# Patient Record
Sex: MALE | Race: WHITE | Employment: OTHER | URBAN - METROPOLITAN AREA
[De-identification: names, ages, dates, MRNs, and addresses within clinical notes are randomized per-mention and may not be internally consistent; named-entity substitution may affect disease eponyms.]

---

## 2018-07-16 RX ORDER — DULOXETIN HYDROCHLORIDE 60 MG/1
60 CAPSULE, DELAYED RELEASE ORAL DAILY
Qty: 30 CAP | Refills: 0 | Status: SHIPPED | OUTPATIENT
Start: 2018-07-16 | End: 2018-08-12 | Stop reason: SDUPTHER

## 2018-07-16 RX ORDER — DULOXETIN HYDROCHLORIDE 60 MG/1
CAPSULE, DELAYED RELEASE ORAL
Qty: 90 CAP | Refills: 3 | OUTPATIENT
Start: 2018-07-16

## 2018-07-16 NOTE — TELEPHONE ENCOUNTER
Requested Prescriptions     Pending Prescriptions Disp Refills    DULoxetine (CYMBALTA) 60 mg capsule 30 Cap 0     Sig: Take 1 Cap by mouth daily.      Refused Prescriptions Disp Refills    DULoxetine (CYMBALTA) 60 mg capsule [Pharmacy Med Name: DULOXETINE HCL DR 60 MG CAP] 90 Cap 3     Sig: TAKE ONE CAPSULE BY MOUTH EVERY DAY     Refused By: Mariano Smart     Reason for Refusal: Appt required, please call patient     Has follow up appt 7-27-18

## 2018-08-12 RX ORDER — DULOXETIN HYDROCHLORIDE 60 MG/1
CAPSULE, DELAYED RELEASE ORAL
Qty: 30 CAP | Refills: 0 | Status: SHIPPED | OUTPATIENT
Start: 2018-08-12 | End: 2018-08-16 | Stop reason: SDUPTHER

## 2018-08-14 PROBLEM — K57.90 DIVERTICULOSIS: Status: ACTIVE | Noted: 2018-08-14

## 2018-08-14 PROBLEM — K63.5 COLON POLYPS: Status: ACTIVE | Noted: 2018-08-14

## 2018-08-14 PROBLEM — M02.30 REACTIVE ARTHRITIS (HCC): Status: ACTIVE | Noted: 2018-08-14

## 2018-08-14 PROBLEM — F17.200 TOBACCO DEPENDENCE: Status: ACTIVE | Noted: 2018-08-14

## 2018-08-14 PROBLEM — K58.9 IBS (IRRITABLE BOWEL SYNDROME): Status: ACTIVE | Noted: 2018-08-14

## 2018-08-14 PROBLEM — F32.A DEPRESSION: Status: ACTIVE | Noted: 2018-08-14

## 2018-08-14 PROBLEM — M79.7 FIBROMYALGIA: Status: ACTIVE | Noted: 2018-08-14

## 2018-08-14 PROBLEM — K50.90 CROHN'S DISEASE (HCC): Status: ACTIVE | Noted: 2018-08-14

## 2018-08-14 PROBLEM — D86.9 SARCOIDOSIS: Status: ACTIVE | Noted: 2018-08-14

## 2018-08-14 PROBLEM — B35.1 ONYCHOMYCOSIS: Status: ACTIVE | Noted: 2018-08-14

## 2018-08-16 ENCOUNTER — OFFICE VISIT (OUTPATIENT)
Dept: INTERNAL MEDICINE CLINIC | Age: 58
End: 2018-08-16

## 2018-08-16 VITALS
HEART RATE: 82 BPM | OXYGEN SATURATION: 95 % | DIASTOLIC BLOOD PRESSURE: 90 MMHG | BODY MASS INDEX: 26.21 KG/M2 | HEIGHT: 73 IN | SYSTOLIC BLOOD PRESSURE: 148 MMHG | WEIGHT: 197.8 LBS

## 2018-08-16 DIAGNOSIS — Z11.59 NEED FOR HEPATITIS C SCREENING TEST: ICD-10-CM

## 2018-08-16 DIAGNOSIS — K50.919 CROHN'S DISEASE WITH COMPLICATION, UNSPECIFIED GASTROINTESTINAL TRACT LOCATION (HCC): ICD-10-CM

## 2018-08-16 DIAGNOSIS — K21.9 GASTROESOPHAGEAL REFLUX DISEASE WITHOUT ESOPHAGITIS: ICD-10-CM

## 2018-08-16 DIAGNOSIS — M79.7 FIBROMYALGIA: ICD-10-CM

## 2018-08-16 DIAGNOSIS — Z23 ENCOUNTER FOR IMMUNIZATION: ICD-10-CM

## 2018-08-16 DIAGNOSIS — Z00.00 ROUTINE PHYSICAL EXAMINATION: Primary | ICD-10-CM

## 2018-08-16 DIAGNOSIS — F51.04 CHRONIC INSOMNIA: ICD-10-CM

## 2018-08-16 LAB
BACTERIA UA POCT, BACTPOCT: NORMAL
BILIRUB UR QL STRIP: NEGATIVE
CASTS UA POCT: 0
CLUE CELLS, CLUEPOCT: NEGATIVE
CRYSTALS UA POCT, CRYSPOCT: NEGATIVE
EPITHELIAL CELLS POCT: NEGATIVE
GLUCOSE UR-MCNC: NEGATIVE MG/DL
GRAN# POC: 4.8 K/UL (ref 2–7.8)
GRAN% POC: 73.1 % (ref 37–92)
HCT VFR BLD CALC: 46.7 % (ref 37–51)
HGB BLD-MCNC: 15.6 G/DL (ref 12–18)
KETONES P FAST UR STRIP-MCNC: NEGATIVE MG/DL
LY# POC: 1.4 K/UL (ref 0.6–4.1)
LY% POC: 23.2 % (ref 10–58.5)
MCH RBC QN: 31.2 PG (ref 26–32)
MCHC RBC-ENTMCNC: 33.4 G/DL (ref 30–36)
MCV RBC: 93 FL (ref 80–97)
MID #, POC: 0.2 K/UL (ref 0–1.8)
MID% POC: 3.7 % (ref 0.1–24)
MUCUS UA POCT, MUCPOCT: NORMAL
PH UR STRIP: 6 [PH] (ref 5–7)
PLATELET # BLD: 289 K/UL (ref 140–440)
PROT UR QL STRIP: NEGATIVE
RBC # BLD: 5 M/UL (ref 4.2–6.3)
RBC UA POCT, RBCPOCT: 0
SP GR UR STRIP: 1.01 (ref 1.01–1.02)
TRICH UA POCT, TRICHPOC: NEGATIVE
UA UROBILINOGEN AMB POC: NORMAL (ref 0.2–1)
URINALYSIS CLARITY POC: CLEAR
URINALYSIS COLOR POC: NORMAL
URINE BLOOD POC: NEGATIVE
URINE CULT COMMENT, POCT: NORMAL
URINE LEUKOCYTES POC: NEGATIVE
URINE NITRITES POC: NEGATIVE
WBC # BLD: 6.4 K/UL (ref 4.1–10.9)
WBC UA POCT, WBCPOCT: 0
YEAST UA POCT, YEASTPOC: NEGATIVE

## 2018-08-16 RX ORDER — DULOXETIN HYDROCHLORIDE 60 MG/1
CAPSULE, DELAYED RELEASE ORAL
Qty: 90 CAP | Refills: 3 | Status: SHIPPED | OUTPATIENT
Start: 2018-08-16 | End: 2019-09-04 | Stop reason: SDUPTHER

## 2018-08-16 RX ORDER — TRAZODONE HYDROCHLORIDE 50 MG/1
TABLET ORAL
COMMUNITY
End: 2022-03-21 | Stop reason: SDUPTHER

## 2018-08-16 RX ORDER — DIPHENOXYLATE HYDROCHLORIDE AND ATROPINE SULFATE 2.5; .025 MG/1; MG/1
TABLET ORAL
COMMUNITY
End: 2020-02-26 | Stop reason: ALTCHOICE

## 2018-08-16 RX ORDER — ZOLPIDEM TARTRATE 12.5 MG/1
TABLET, FILM COATED, EXTENDED RELEASE ORAL
Refills: 3 | COMMUNITY
Start: 2018-07-24 | End: 2022-03-21 | Stop reason: SDUPTHER

## 2018-08-16 RX ORDER — DEXLANSOPRAZOLE 60 MG/1
60 CAPSULE, DELAYED RELEASE ORAL
COMMUNITY

## 2018-08-16 NOTE — PATIENT INSTRUCTIONS
Learning About Cutting Calories  How do calories affect your weight? Food gives your body energy. Energy from the food you eat is measured in calories. This energy keeps your heart beating, your brain active, and your muscles working. Your body needs a certain number of calories each day. After your body uses the calories it needs, it stores extra calories as fat. To lose weight safely, you have to eat fewer calories while eating in a healthy way. How many calories do you need each day? The more active you are, the more calories you need. When you are less active, you need fewer calories. How many calories you need each day also depends on several things, including your age and whether you are male or female. Here are some general guidelines for adults:  · Less active women and older adults need 1,600 to 2,000 calories each day. · Active women and less active men need 2,000 to 2,400 calories each day. · Active men need 2,400 to 3,000 calories each day. How can you cut calories and eat healthy meals? Whole grains, vegetables and fruits, and dried beans are good lower-calorie foods. They give you lots of nutrients and fiber. And they fill you up. Sweets, energy drinks, and soda pop are high in calories. They give you few nutrients and no fiber. Try to limit soda pop, fruit juice, and energy drinks. Drink water instead. Some fats can be part of a healthy diet. But cutting back on fats from highly processed foods like fast foods and many snack foods is a good way to lower the calories in your diet. Also, use smaller amounts of fats like butter, margarine, salad dressing, and mayonnaise. Add fresh garlic, lemon, or herbs to your meals to add flavor without adding fat. Meats and dairy products can be a big source of hidden fats. Try to choose lean or low-fat versions of these products. Fat-free cookies, candies, chips, and frozen treats can still be high in sugar and calories.  Some fat-free foods have more calories than regular ones. Eat fat-free treats in moderation, as you would other foods. If your favorite foods are high in fat, salt, sugar, or calories, limit how often you eat them. Eat smaller servings, or look for healthy substitutes. Fill up on fruits, vegetables, and whole grains. Eating at home  · Use meat as a side dish instead of as the main part of your meal.  · Try main dishes that use whole wheat pasta, brown rice, dried beans, or vegetables. · Find ways to cook with little or no fat, such as broiling, steaming, or grilling. · Use cooking spray instead of oil. If you use oil, use a monounsaturated oil, such as canola or olive oil. · Trim fat from meats before you cook them. · Drain off fat after you brown the meat or while you roast it. · Chill soups and stews after you cook them. Then skim the fat off the top after it hardens. Eating out  · Order foods that are broiled or poached rather than fried or breaded. · Cut back on the amount of butter or margarine that you use on bread. · Order sauces, gravies, and salad dressings on the side, and use only a little. · When you order pasta, choose tomato sauce rather than cream sauce. · Ask for salsa with your baked potato instead of sour cream, butter, cheese, or leos. · Order meals in a small size instead of upgrading to a large. · Share an entree, or take part of your food home to eat as another meal.  · Share appetizers and desserts. Where can you learn more? Go to http://mane-evelio.info/. Enter [id number] in the search box to learn more about \"Learning About Cutting Calories. \"  Current as of: May 12, 2017  Content Version: 11.7  © 7165-2631 [employer]. Care instructions adapted under license by [employer] (which disclaims liability or warranty for this information).  If you have questions about a medical condition or this instruction, always ask your healthcare professional. [employer] disclaims any warranty or liability for your use of this information. Vaccine Information Statement     Tdap (Tetanus, Diphtheria, Pertussis) Vaccine: What You Need to Know    Many Vaccine Information Statements are available in [place]ian and other languages. See www.immunize.org/vis. Hojas de Información Sobre Vacunas están disponibles en español y en muchos otros idiomas. Visite [employer]    1. Why get vaccinated? Tetanus, diphtheria, and pertussis are very serious diseases. Tdap vaccine can protect us from these diseases. And, Tdap vaccine given to pregnant women can protect  babies against pertussis. TETANUS (Lockjaw) is rare in the [facility] today. It causes painful muscle tightening and stiffness, usually all over the body.  It can lead to tightening of muscles in the head and neck so you cant open your mouth, swallow, or sometimes even breathe. Tetanus kills about 1 out of 10 people who are infected even after receiving the best medical care. DIPHTHERIA is also rare in the [facility] today. It can cause a thick coating to form in the back of the throat.  It can lead to breathing problems, heart failure, paralysis, and death. PERTUSSIS (Whooping Cough) causes severe coughing spells, which can cause difficulty breathing, vomiting, and disturbed sleep.  It can also lead to weight loss, incontinence, and rib fractures. Up to 2 in 100 adolescents and 5 in 100 adults with pertussis are hospitalized or have complications, which could include pneumonia or death. These diseases are caused by bacteria. Diphtheria and pertussis are spread from person to person through secretions from coughing or sneezing. Tetanus enters the body through cuts, scratches, or wounds. Before vaccines, as many as 200,000 cases of diphtheria, 200,000 cases of pertussis, and hundreds of cases of tetanus, were reported in the [place] each year.  Since vaccination began, reports of cases for tetanus and diphtheria have dropped by about 99% and for pertussis by about 80%. 2. Tdap vaccine    Tdap vaccine can protect adolescents and adults from tetanus, diphtheria, and pertussis. One dose of Tdap is routinely given at age 6 or 15. People who did not get Tdap at that age should get it as soon as possible. Tdap is especially important for health care professionals and anyone having close contact with a baby younger than 12 months. Pregnant women should get a dose of Tdap during every pregnancy, to protect the  from pertussis. Infants are most at risk for severe, life-threatening complications from pertussis. Another vaccine, called Td, protects against tetanus and diphtheria, but not pertussis. A Td booster should be given every 10 years. Tdap may be given as one of these boosters if you have never gotten Tdap before. Tdap may also be given after a severe cut or burn to prevent tetanus infection. Your doctor or the person giving you the vaccine can give you more information. Tdap may safely be given at the same time as other vaccines. 3. Some people should not get this vaccine     A person who has ever had a life-threatening allergic reaction after a previous dose of any diphtheria, tetanus or pertussis containing vaccine, OR has a severe allergy to any part of this vaccine, should not get Tdap vaccine. Tell the person giving the vaccine about any severe allergies.  Anyone who had coma or long repeated seizures within 7 days after a childhood dose of DTP or DTaP, or a previous dose of Tdap, should not get Tdap, unless a cause other than the vaccine was found. They can still get Td.      Talk to your doctor if you:  - have seizures or another nervous system problem,  - had severe pain or swelling after any vaccine containing diphtheria, tetanus or pertussis,   - ever had a condition called Guillain Barré Syndrome (GBS),  - arent feeling well on the day the shot is scheduled. 4. Risks    With any medicine, including vaccines, there is a chance of side effects. These are usually mild and go away on their own. Serious reactions are also possible but are rare. Most people who get Tdap vaccine do not have any problems with it. Mild Problems following Tdap  (Did not interfere with activities)   Pain where the shot was given (about 3 in 4 adolescents or 2 in 3 adults)   Redness or swelling where the shot was given (about 1 person in 5)   Mild fever of at least 100.4°F (up to about 1 in 25 adolescents or 1 in 100 adults)   Headache (about 3 or 4 people in 10)   Tiredness (about 1 person in 3 or 4)   Nausea, vomiting, diarrhea, stomach ache (up to 1 in 4 adolescents or 1 in 10 adults)   Chills,  sore joints (about 1 person in 10)   Body aches (about 1 person in 3 or 4)    Rash, swollen glands (uncommon)    Moderate Problems following Tdap  (Interfered with activities, but did not require medical attention)   Pain where the shot was given (up to 1 in 5 or 6)    Redness or swelling where the shot was given (up to about 1 in 16 adolescents or 1 in 12 adults)   Fever over 102°F (about 1 in 100 adolescents or 1 in 250 adults)   Headache (about 1 in 7 adolescents or 1 in 10 adults)   Nausea, vomiting, diarrhea, stomach ache (up to 1 or 3 people in 100)   Swelling of the entire arm where the shot was given (up to about 1 in 500). Severe Problems following Tdap  (Unable to perform usual activities; required medical attention)   Swelling, severe pain, bleeding, and redness in the arm where the shot was given (rare). Problems that could happen after any vaccine:     People sometimes faint after a medical procedure, including vaccination. Sitting or lying down for about 15 minutes can help prevent fainting, and injuries caused by a fall.  Tell your doctor if you feel dizzy, or have vision changes or ringing in the ears.     Some people get severe pain in the shoulder and have difficulty moving the arm where a shot was given. This happens very rarely.  Any medication can cause a severe allergic reaction. Such reactions from a vaccine are very rare, estimated at fewer than 1 in a million doses, and would happen within a few minutes to a few hours after the vaccination. As with any medicine, there is a very remote chance of a vaccine causing a serious injury or death. The safety of vaccines is always being monitored. For more information, visit: www.cdc.gov/vaccinesafety/    5. What if there is a serious problem? What should I look for?  Look for anything that concerns you, such as signs of a severe allergic reaction, very high fever, or unusual behavior.  Signs of a severe allergic reaction can include hives, swelling of the face and throat, difficulty breathing, a fast heartbeat, dizziness, and weakness. These would usually start a few minutes to a few hours after the vaccination. What should I do?  If you think it is a severe allergic reaction or other emergency that cant wait, call 9-1-1 or get the person to the nearest hospital. Otherwise, call your doctor.  Afterward, the reaction should be reported to the Vaccine Adverse Event Reporting System (VAERS). Your doctor might file this report, or you can do it yourself through the VAERS web site at www.vaers. hhs.gov, or by calling 8-614.188.2816. VAERS does not give medical advice. 6. The National Vaccine Injury Compensation Program    The [facility] Vaccine Injury Compensation Program (VICP) is a federal program that was created to compensate people who may have been injured by certain vaccines. Persons who believe they may have been injured by a vaccine can learn about the program and about filing a claim by calling 8-766.168.9637 or visiting the [employer] website at www.[facility].gov/vaccinecompensation.  There is a time limit to file a claim for compensation. 7. How can I learn more?  Ask your doctor. He or she can give you the vaccine package insert or suggest other sources of information.  Call your local or state health department.  Contact the Centers for Disease Control and Prevention (CDC):  - Call 0-626.639.8401 (1-800-CDC-INFO) or  - Visit CDCs website at www.cdc.gov/vaccines      Vaccine Information Statement   Tdap Vaccine  (2/24/2015)  42 SARAVANAN Delgado [id number]    Department of Health and Human Services  Centers for Disease Control and Prevention    Office Use Only

## 2018-08-16 NOTE — MR AVS SNAPSHOT
[address] [place] [address] [address] [id number] [phone] Patient: Campbell Rm. MRN: [id number] BQN:[id number] Visit Information Date & Time Provider Department Dept. Phone Encounter #  
 8/16/2018 10:00 AM Doug Maldonado [phone] [id number] Follow-up Instructions Return in about 1 year (around 8/16/2019). Upcoming Health Maintenance Date Due Hepatitis C Screening 1960 DTaP/Tdap/Td series (1 - Tdap) 6/19/1981 Influenza Age 5 to Adult 8/1/2018 COLONOSCOPY 7/20/2020 Allergies as of 8/16/2018  Review Complete On: 8/16/2018 By: Nunu Patel MD  
  
 Severity Noted Reaction Type Reactions Remicade [Infliximab]  08/16/2018    Other (comments) Current Immunizations  Never Reviewed Name Date Tdap  Incomplete Not reviewed this visit You Were Diagnosed With   
  
 Codes Comments Routine physical examination    -  Primary ICD-10-CM: Z00.00 ICD-9-CM: V70.0 Fibromyalgia     ICD-10-CM: M79.7 ICD-9-CM: 729.1 Crohn's disease with complication, unspecified gastrointestinal tract location [facility])     ICD-10-CM: B93.727 ICD-9-CM: 555.9 Gastroesophageal reflux disease without esophagitis     ICD-10-CM: K21.9 ICD-9-CM: 530.81 Chronic insomnia     ICD-10-CM: F51.04 
ICD-9-CM: 780.52 Need for hepatitis C screening test     ICD-10-CM: Z11.59 
ICD-9-CM: V73.89 Encounter for immunization     ICD-10-CM: L28 ICD-9-CM: V03.89 Vitals BP Pulse Height(growth percentile) Weight(growth percentile) SpO2 BMI  
 148/90 (BP 1 Location: Right arm, BP Patient Position: Sitting) 82 6' 1\" (1.854 m) 197 lb 12.8 oz (89.7 kg) 95% 26.1 kg/m2 Smoking Status Current Every Day Smoker BMI and BSA Data Body Mass Index Body Surface Area  
 26.1 kg/m 2 2.15 m 2 Preferred Pharmacy Pharmacy Name Phone [facility][id number] [facility] [phone] Your Updated Medication List  
  
   
This list is accurate as of 8/16/18 10:47 AM.  Always use your most recent med list.  
  
  
  
  
 DEXILANT 60 mg Cpdb Generic drug:  Dexlansoprazole Take  by mouth. DULoxetine 60 mg capsule Commonly known as:  CYMBALTA TAKE 1 CAPSULE BY MOUTH EVERY DAY  
  
 LOMOTIL 2.5-0.025 mg per tablet Generic drug:  diphenoxylate-atropine Take  by mouth four (4) times daily as needed for Diarrhea. traZODone 50 mg tablet Commonly known as:  Debbe Gunner Take  by mouth nightly. zolpidem CR 12.5 mg tablet Commonly known as:  AMBIEN CR  
TAKE 1 TABLET BY MOUTH AT BEDTIME Prescriptions Sent to Pharmacy Refills DULoxetine (CYMBALTA) 60 mg capsule 3 Sig: TAKE 1 CAPSULE BY MOUTH EVERY DAY Class: Normal  
 Pharmacy: [address] #: [phone] We Performed the Following AMB POC COMPLETE CBC,AUTOMATED ENTER [id number] CPT(R)] AMB POC URINALYSIS DIP STICK AUTO W/ MICRO  [48609 CPT(R)] COLLECTION VENOUS BLOOD,VENIPUNCTURE [id number] CPT(R)] HEPATITIS C AB [76275 CPT(R)] LIPID PANEL [46024 CPT(R)] METABOLIC PANEL, COMPREHENSIVE [79084 CPT(R)] DC IMMUNIZ ADMIN,1 SINGLE/COMB VAC/TOXOID [id number] CPT(R)] PSA, DIAGNOSTIC (PROSTATE SPECIFIC AG) [id number] CPT(R)] TETANUS, DIPHTHERIA TOXOIDS AND ACELLULAR PERTUSSIS VACCINE (TDAP), IN INDIVIDS. >=7, IM [id number] CPT(R)] TSH 3RD GENERATION [55946 CPT(R)] Follow-up Instructions Return in about 1 year (around 8/16/2019). Patient Instructions Learning About Cutting Calories How do calories affect your weight? Food gives your body energy.  Energy from the food you eat is measured in calories. This energy keeps your heart beating, your brain active, and your muscles working. Your body needs a certain number of calories each day. After your body uses the calories it needs, it stores extra calories as fat. To lose weight safely, you have to eat fewer calories while eating in a healthy way. How many calories do you need each day? The more active you are, the more calories you need. When you are less active, you need fewer calories. How many calories you need each day also depends on several things, including your age and whether you are male or female. Here are some general guidelines for adults: 
· Less active women and older adults need 1,600 to 2,000 calories each day. · Active women and less active men need 2,000 to 2,400 calories each day. · Active men need 2,400 to 3,000 calories each day. How can you cut calories and eat healthy meals? Whole grains, vegetables and fruits, and dried beans are good lower-calorie foods. They give you lots of nutrients and fiber. And they fill you up. Sweets, energy drinks, and soda pop are high in calories. They give you few nutrients and no fiber. Try to limit soda pop, fruit juice, and energy drinks. Drink water instead. Some fats can be part of a healthy diet. But cutting back on fats from highly processed foods like fast foods and many snack foods is a good way to lower the calories in your diet. Also, use smaller amounts of fats like butter, margarine, salad dressing, and mayonnaise. Add fresh garlic, lemon, or herbs to your meals to add flavor without adding fat. Meats and dairy products can be a big source of hidden fats. Try to choose lean or low-fat versions of these products. Fat-free cookies, candies, chips, and frozen treats can still be high in sugar and calories. Some fat-free foods have more calories than regular ones. Eat fat-free treats in moderation, as you would other foods. If your favorite foods are high in fat, salt, sugar, or calories, limit how often you eat them. Eat smaller servings, or look for healthy substitutes. Fill up on fruits, vegetables, and whole grains. Eating at home · Use meat as a side dish instead of as the main part of your meal. 
· Try main dishes that use whole wheat pasta, brown rice, dried beans, or vegetables. · Find ways to cook with little or no fat, such as broiling, steaming, or grilling. · Use cooking spray instead of oil. If you use oil, use a monounsaturated oil, such as canola or olive oil. · Trim fat from meats before you cook them. · Drain off fat after you brown the meat or while you roast it. · Chill soups and stews after you cook them. Then skim the fat off the top after it hardens. Eating out · Order foods that are broiled or poached rather than fried or breaded. · Cut back on the amount of butter or margarine that you use on bread. · Order sauces, gravies, and salad dressings on the side, and use only a little. · When you order pasta, choose tomato sauce rather than cream sauce. · Ask for salsa with your baked potato instead of sour cream, butter, cheese, or leos. · Order meals in a small size instead of upgrading to a large. · Share an entree, or take part of your food home to eat as another meal. 
· Share appetizers and desserts. Where can you learn more? Go to http://mane-evelio.info/. Enter [phone] [id number] in the search box to learn more about \"Learning About Cutting Calories. \" Current as of: May 12, 2017 Content Version: 11.7 © 0765-1779 [employer], [employer]. Care instructions adapted under license by [employer] (which disclaims liability or warranty for this information). If you have questions about a medical condition or this instruction, always ask your healthcare professional. [address] any warranty or liability for your use of this information. Vaccine Information Statement Tdap (Tetanus, Diphtheria, Pertussis) Vaccine: What You Need to Know Many Vaccine Information Statements are available in [place]ian and other languages. See www.immunize.org/vis. Hojas de Información Sobre Vacunas están disponibles en español y en muchos otros idiomas. Visite JasScale.si 1. Why get vaccinated? Tetanus, diphtheria, and pertussis are very serious diseases. Tdap vaccine can protect us from these diseases. And, Tdap vaccine given to pregnant women can protect  babies against pertussis. TETANUS (Lockjaw) is rare in the [facility] today. It causes painful muscle tightening and stiffness, usually all over the body. ? It can lead to tightening of muscles in the head and neck so you cant open your mouth, swallow, or sometimes even breathe. Tetanus kills about 1 out of 10 people who are infected even after receiving the best medical care. DIPHTHERIA is also rare in the [facility] today. It can cause a thick coating to form in the back of the throat. ? It can lead to breathing problems, heart failure, paralysis, and death. PERTUSSIS (Whooping Cough) causes severe coughing spells, which can cause difficulty breathing, vomiting, and disturbed sleep. ? It can also lead to weight loss, incontinence, and rib fractures. Up to 2 in 100 adolescents and 5 in 100 adults with pertussis are hospitalized or have complications, which could include pneumonia or death. These diseases are caused by bacteria. Diphtheria and pertussis are spread from person to person through secretions from coughing or sneezing. Tetanus enters the body through cuts, scratches, or wounds. Before vaccines, as many as 200,000 cases of diphtheria, 200,000 cases of pertussis, and hundreds of cases of tetanus, were reported in the [place] each year.  Since vaccination began, reports of cases for tetanus and diphtheria have dropped by about 99% and for pertussis by about 80%. 2. Tdap vaccine Tdap vaccine can protect adolescents and adults from tetanus, diphtheria, and pertussis. One dose of Tdap is routinely given at age 6 or 15. People who did not get Tdap at that age should get it as soon as possible. Tdap is especially important for health care professionals and anyone having close contact with a baby younger than 12 months. Pregnant women should get a dose of Tdap during every pregnancy, to protect the  from pertussis. Infants are most at risk for severe, life-threatening complications from pertussis. Another vaccine, called Td, protects against tetanus and diphtheria, but not pertussis. A Td booster should be given every 10 years. Tdap may be given as one of these boosters if you have never gotten Tdap before. Tdap may also be given after a severe cut or burn to prevent tetanus infection. Your doctor or the person giving you the vaccine can give you more information. Tdap may safely be given at the same time as other vaccines. 3. Some people should not get this vaccine  A person who has ever had a life-threatening allergic reaction after a previous dose of any diphtheria, tetanus or pertussis containing vaccine, OR has a severe allergy to any part of this vaccine, should not get Tdap vaccine. Tell the person giving the vaccine about any severe allergies.  Anyone who had coma or long repeated seizures within 7 days after a childhood dose of DTP or DTaP, or a previous dose of Tdap, should not get Tdap, unless a cause other than the vaccine was found. They can still get Td.  Talk to your doctor if you: 
- have seizures or another nervous system problem, 
- had severe pain or swelling after any vaccine containing diphtheria, tetanus or pertussis,  
- ever had a condition called Guillain Barré Syndrome (GBS), 
- arent feeling well on the day the shot is scheduled. 4. Risks With any medicine, including vaccines, there is a chance of side effects. These are usually mild and go away on their own. Serious reactions are also possible but are rare. Most people who get Tdap vaccine do not have any problems with it. Mild Problems following Tdap 
(Did not interfere with activities)  Pain where the shot was given (about 3 in 4 adolescents or 2 in 3 adults)  Redness or swelling where the shot was given (about 1 person in 5)  Mild fever of at least 100.4°F (up to about 1 in 25 adolescents or 1 in 100 adults)  Headache (about 3 or 4 people in 10)  Tiredness (about 1 person in 3 or 4)  Nausea, vomiting, diarrhea, stomach ache (up to 1 in 4 adolescents or 1 in 10 adults)  Chills,  sore joints (about 1 person in 10)  Body aches (about 1 person in 3 or 4)  Rash, swollen glands (uncommon) Moderate Problems following Tdap (Interfered with activities, but did not require medical attention)  Pain where the shot was given (up to 1 in 5 or 6)  Redness or swelling where the shot was given (up to about 1 in 16 adolescents or 1 in 12 adults)  Fever over 102°F (about 1 in 100 adolescents or 1 in 250 adults)  Headache (about 1 in 7 adolescents or 1 in 10 adults)  Nausea, vomiting, diarrhea, stomach ache (up to 1 or 3 people in 100)  Swelling of the entire arm where the shot was given (up to about 1 in 500). Severe Problems following Tdap 
(Unable to perform usual activities; required medical attention)  Swelling, severe pain, bleeding, and redness in the arm where the shot was given (rare). Problems that could happen after any vaccine:  People sometimes faint after a medical procedure, including vaccination. Sitting or lying down for about 15 minutes can help prevent fainting, and injuries caused by a fall. Tell your doctor if you feel dizzy, or have vision changes or ringing in the ears.  Some people get severe pain in the shoulder and have difficulty moving the arm where a shot was given. This happens very rarely.  Any medication can cause a severe allergic reaction. Such reactions from a vaccine are very rare, estimated at fewer than 1 in a million doses, and would happen within a few minutes to a few hours after the vaccination. As with any medicine, there is a very remote chance of a vaccine causing a serious injury or death. The safety of vaccines is always being monitored. For more information, visit: www.cdc.gov/vaccinesafety/ 
 
 
The [place] [place] Vaccine Injury Compensation Program (VICP) is a federal program that was created to compensate people who may have been injured by certain vaccines. Persons who believe they may have been injured by a vaccine can learn about the program and about filing a claim by calling 9-835.346.4818 or visiting the [employer] website at www.[facility].gov/vaccinecompensation.  There is a time limit to file a claim for compensation. 7. How can I learn more?  Ask your doctor. He or she can give you the vaccine package insert or suggest other sources of information.  Call your local or state health department.  Contact the Centers for Disease Control and Prevention (CDC): 
- Call 8-316.965.9151 (1-800-CDC-INFO) or 
- Visit CDCs website at www.cdc.gov/vaccines Vaccine Information Statement Tdap Vaccine 
(2/24/2015) [address] 028LE-72 [facility] and [employer] Centers for Disease Control and Prevention Office Use Only Introducing [facility] [place] [facility]! Romayne Duster introduces [employer] patient portal. Now you can access parts of your medical record, email your doctor's office, and request medication refills online. 1. In your internet browser, go to https://"[employer]". [employer]/"[employer]" 2. Click on the First Time User? Click Here link in the Sign In box. You will see the New Member Sign Up page. 3. Enter your [employer] Access Code exactly as it appears below. You will not need to use this code after youve completed the sign-up process. If you do not sign up before the expiration date, you must request a new code. · [employer] Access Code: [email or web address] Expires: 11/14/2018 10:17 AM 
 
4. Enter the last four digits of your Social Security Number (xxxx) and Date of Birth (mm/dd/yyyy) as indicated and click Submit. You will be taken to the next sign-up page. 5. Create a Prive[employer] ID. This will be your [employer] login ID and cannot be changed, so think of one that is secure and easy to remember. 6. Create a [employer] password. You can change your password at any time. 7. Enter your Password Reset Question and Answer. This can be used at a later time if you forget your password. 8. Enter your e-mail address. You will receive e-mail notification when new information is available in [address] 9. Click Sign Up. You can now view and download portions of your medical record. 10. Click the Download Summary menu link to download a portable copy of your medical information. If you have questions, please visit the Frequently Asked Questions section of the [employer] website. Remember, [employer] is NOT to be used for urgent needs. For medical emergencies, dial 911. Now available from your iPhone and Android! Please provide this summary of care documentation to your next provider. Your primary care clinician is listed as ELIER Schwab. If you have any questions after today's visit, please call [phone].

## 2018-08-16 NOTE — PROGRESS NOTES
Thorave Ramos. is a 62 y.o. male presenting for Complete Physical  .     1. Have you been to the ER, urgent care clinic since your last visit? Hospitalized since your last visit? No    2. Have you seen or consulted any other health care providers outside of the [address] [place] [address] since your last visit? Include any pap smears or colon screening. Dr Asif. No flowsheet data found. No flowsheet data found. No flowsheet data found. There are no discontinued medications.

## 2018-08-16 NOTE — PROGRESS NOTES
This note will not be viewable in [address] Subjective:     Thor Ramos. is a 62 y.o. male presenting for annual comprehensive personal healthcare examination. Mr. Gavi Alford is a 80-year-old  male accompanied to the office today by his wife and presents for a complete checkup. It is been over one year since we have last seen him. The patient has a history of fibromyalgia for which she is on Dexilant and he uses Ambien for chronic insomnia and trazodone for his muscular pain. Patient has been on this regimen for some time without recent exacerbation the patient also has Crohn's disease and a history of recurrent colon polyps and is followed by Dr. Enedina Casanova. He currently only takes Lomotil for loose stools but is on no treatment otherwise. The patient is tobacco dependent and does have a history of sarcoidosis but denies any cough wheezing or shortness of breath. The patient is up-to-date on his colonoscopy which was done in July 2017. He does not drink alcohol. His review of systems is otherwise negative is noted. History of present illness: This patient has multiple medical problems.   These include:  Patient Active Problem List   Diagnosis Code    Reactive arthritis ([facility] [address] M02.30    Colon polyps K63.5    Crohn's disease ([facility] [address] K50.90    Depression F32.9    Diverticulosis K57.90    Fibromyalgia M79.7    IBS (irritable bowel syndrome) K58.9    Onychomycosis B35.1    Sarcoidosis D86.9    Tobacco dependence F17.200    Gastroesophageal reflux disease without esophagitis K21.9    Chronic insomnia F51.04        Past Medical History:   Diagnosis Date    Chronic insomnia 8/16/2018    Colon polyps 8/14/2018    Crohn's disease ([address] 8/14/2018    Depression 8/14/2018    Diverticulosis 8/14/2018    Fibromyalgia 8/14/2018    Gastroesophageal reflux disease without esophagitis 8/16/2018    IBS (irritable bowel syndrome) 8/14/2018    Onychomycosis 8/14/2018    Reactive arthritis ([facility] [address] 8/14/2018    Sarcoidosis 8/14/2018     Past Surgical History:   Procedure Laterality Date    HX ORTHOPAEDIC      L3-5 lumbar surgery    HX TONSILLECTOMY       Allergies   Allergen Reactions    Remicade [Infliximab] Other (comments)     Current Outpatient Prescriptions   Medication Sig Dispense Refill    zolpidem CR (AMBIEN CR) 12.5 mg tablet TAKE 1 TABLET BY MOUTH AT BEDTIME  3    Dexlansoprazole (DEXILANT) 60 mg CpDB Take  by mouth.  diphenoxylate-atropine (LOMOTIL) 2.5-0.025 mg per tablet Take  by mouth four (4) times daily as needed for Diarrhea.  traZODone (DESYREL) 50 mg tablet Take  by mouth nightly.  DULoxetine (CYMBALTA) 60 mg capsule TAKE 1 CAPSULE BY MOUTH EVERY DAY 90 Cap 3     Social History     Social History    Marital status:      Spouse name: N/A    Number of children: N/A    Years of education: N/A     Occupational History    refinishes furniture      Social History Main Topics    Smoking status: Current Every Day Smoker     Packs/day: 1.50    Smokeless tobacco: Never Used    Alcohol use No    Drug use: None    Sexual activity: Not Asked     Other Topics Concern    None     Social History Narrative     Family History   Problem Relation Age of Onset    Hypertension Mother     Hypertension Father     Prostate Cancer Father     Hypertension Brother     Hypertension Paternal Grandfather     Colon Cancer Paternal Grandfather     Colon Cancer Paternal Uncle        Health Maintenance   Topic Date Due    Hepatitis C Screening  1960    DTaP/Tdap/Td series (1 - Tdap) 06/19/1981    Influenza Age 5 to Adult  08/01/2018    COLONOSCOPY  07/20/2020       Review of Systems  Constitutional:  He denies fevers, weight loss, sweats, or fatigue. HEENT:  No blurred or double vision, headaches or dizziness. No difficulty with swallowing, taste, speech or smell. Respiratory:  No cough, wheezing or shortness of breath, or sputum production.    Cardiac:  Denies chest pain, palpitations, unexplained indigestion, syncope, edema, PND or orthopnea. GI:  No changes in bowel habits, abdominal pain, no bloating, anorexia, nausea, vomiting or heartburn. :  He denies frequency, nocturia, stranguria, dysuria or sexual dysfunction. Extremities:  No joint pain, stiffness or swelling. Skin:  No recent rash or mole changes. Neurological:  No numbness, tingling, burning paresthesias or loss of motor strength. No syncope, dizziness, frequent headaches or memory loss. ROS otherwise negative       Objective:     Vitals:    08/16/18 1022   BP: 148/90   Pulse: 82   SpO2: 95%   Weight: 197 lb 12.8 oz (89.7 kg)   Height: 6' 1\" (1.854 m)   PainSc:   0 - No pain      Body mass index is 26.1 kg/(m^2). Physical exam:   General Appearance:  Well-developed, well-nourished, no acute distress. Vision:  Deferred to ophthalmologist.    Hearing: HEENT:    Ears:  The TMs and ear canals were clear. Eyes:  The pupillary responses were normal.  Extraocular muscle function intact. Lids and conjunctiva not injected. Funduscopic exam revealed sharp disc margins. Pharynx:  Clear with teeth in good repair. No masses were noted. Neck:  Supple without thyromegaly or adenopathy. No JVD noted. No carotid bruits. Lungs: Clear to auscultation and percussion. Cardiac:  Regular rate and rhythm. No murmur. PMI not displaced. No gallop, rub or click. Abdomen:  Flat, soft, non-tender without palpable organomegaly or mass. No pulsatile mass was felt, and no bruit was heard. Bowel sounds were active. Extremities:  No clubbing, cyanosis or edema. Pulses:  Dorsalis pedis and posterior tibial pulses felt without difficulty. Skin:  No unusual rash or mole changes noted. Lymph Nodes:  None felt in the cervical, supraclavicular, axillary or inguinal region. Neurological:  Cranial nerves II-XII grossly intact. Motor strength 5/5. DTRs 2+ and symmetric.   Station and gait normal. Assessment/Plan:   Impressions:  Diagnoses and all orders for this visit:    Routine physical examination  -     AMB POC COMPLETE CBC,AUTOMATED ENTER  -     COLLECTION VENOUS BLOOD,VENIPUNCTURE  -     AMB POC URINALYSIS DIP STICK AUTO W/ MICRO   -     LIPID PANEL  -     METABOLIC PANEL, COMPREHENSIVE  -     PROSTATE SPECIFIC AG  -     TSH 3RD GENERATION    Fibromyalgia  -     DULoxetine (CYMBALTA) 60 mg capsule; TAKE 1 CAPSULE BY MOUTH EVERY DAY, Normal, Disp-90 Cap, R-3    Crohn's disease with complication, unspecified gastrointestinal tract location (HCC)    Gastroesophageal reflux disease without esophagitis    Chronic insomnia    Need for hepatitis C screening test  -     HEPATITIS C AB    Encounter for immunization  -     Tetanus, diphtheria toxoids and acellular pertussis (TDAP) vaccine, in individuals >=7 years, IM  -     PA IMMUNIZ ADMIN,1 SINGLE/COMB VAC/TOXOID        Other instructions: The patient's medications are reviewed and reconciled. No change in his current medical regimen is made. Body mass index is 26.10 and dietary counseling along with printed patient education is given    Age-appropriate vaccinations were reviewed and we have recommended an influenza vaccination after 1 September. He requests a Tdap vaccination due to the fact that he is a  and does have splinters and exposures to wounds periodically. Continue GI follow-up in regards to his colon polyps and Crohn's disease    Await results of multiple labs    Follow-up yearly    Follow-up Disposition:  Return in about 1 year (around 8/16/2019).     Kendra Chow MD

## 2018-08-17 LAB
ALBUMIN SERPL-MCNC: 4.5 G/DL (ref 3.5–5.5)
ALBUMIN/GLOB SERPL: 1.9 {RATIO} (ref 1.2–2.2)
ALP SERPL-CCNC: 107 IU/L (ref 39–117)
ALT SERPL-CCNC: 25 IU/L (ref 0–44)
AST SERPL-CCNC: 26 IU/L (ref 0–40)
BILIRUB SERPL-MCNC: 0.3 MG/DL (ref 0–1.2)
BUN SERPL-MCNC: 20 MG/DL (ref 6–24)
BUN/CREAT SERPL: 22 (ref 9–20)
CALCIUM SERPL-MCNC: 9.6 MG/DL (ref 8.7–10.2)
CHLORIDE SERPL-SCNC: 105 MMOL/L (ref 96–106)
CHOLEST SERPL-MCNC: 184 MG/DL (ref 100–199)
CO2 SERPL-SCNC: 22 MMOL/L (ref 20–29)
CREAT SERPL-MCNC: 0.92 MG/DL (ref 0.76–1.27)
GLOBULIN SER CALC-MCNC: 2.4 G/DL (ref 1.5–4.5)
GLUCOSE SERPL-MCNC: 100 MG/DL (ref 65–99)
HCV AB S/CO SERPL IA: <0.1 S/CO RATIO (ref 0–0.9)
HDLC SERPL-MCNC: 44 MG/DL
LDLC SERPL CALC-MCNC: 124 MG/DL (ref 0–99)
POTASSIUM SERPL-SCNC: 4.3 MMOL/L (ref 3.5–5.2)
PROT SERPL-MCNC: 6.9 G/DL (ref 6–8.5)
PSA SERPL-MCNC: 1.1 NG/ML (ref 0–4)
SODIUM SERPL-SCNC: 140 MMOL/L (ref 134–144)
TRIGL SERPL-MCNC: 78 MG/DL (ref 0–149)
TSH SERPL DL<=0.005 MIU/L-ACNC: 0.99 UIU/ML (ref 0.45–4.5)
VLDLC SERPL CALC-MCNC: 16 MG/DL (ref 5–40)

## 2019-06-30 ENCOUNTER — HOSPITAL ENCOUNTER (EMERGENCY)
Age: 59
Discharge: HOME | End: 2019-06-30
Attending: EMERGENCY MEDICINE
Payer: COMMERCIAL

## 2019-06-30 VITALS
RESPIRATION RATE: 16 BRPM | TEMPERATURE: 97.9 F | BODY MASS INDEX: 26.9 KG/M2 | OXYGEN SATURATION: 100 % | HEART RATE: 82 BPM | HEIGHT: 72 IN | DIASTOLIC BLOOD PRESSURE: 97 MMHG | SYSTOLIC BLOOD PRESSURE: 163 MMHG | WEIGHT: 198.63 LBS

## 2019-06-30 DIAGNOSIS — I16.0 HYPERTENSIVE URGENCY: Primary | ICD-10-CM

## 2019-06-30 DIAGNOSIS — I10 ESSENTIAL HYPERTENSION: ICD-10-CM

## 2019-06-30 LAB
ANION GAP SERPL CALC-SCNC: 6 MMOL/L (ref 5–15)
BNP SERPL-MCNC: 215 PG/ML
BUN SERPL-MCNC: 15 MG/DL (ref 6–20)
BUN/CREAT SERPL: 15 (ref 12–20)
CALCIUM SERPL-MCNC: 8.6 MG/DL (ref 8.5–10.1)
CHLORIDE SERPL-SCNC: 111 MMOL/L (ref 97–108)
CO2 SERPL-SCNC: 24 MMOL/L (ref 21–32)
CREAT SERPL-MCNC: 0.99 MG/DL (ref 0.7–1.3)
ERYTHROCYTE [DISTWIDTH] IN BLOOD BY AUTOMATED COUNT: 12.7 % (ref 11.5–14.5)
GLUCOSE SERPL-MCNC: 129 MG/DL (ref 65–100)
HCT VFR BLD AUTO: 44.8 % (ref 36.6–50.3)
HGB BLD-MCNC: 15.3 G/DL (ref 12.1–17)
MCH RBC QN AUTO: 30.3 PG (ref 26–34)
MCHC RBC AUTO-ENTMCNC: 34.2 G/DL (ref 30–36.5)
MCV RBC AUTO: 88.7 FL (ref 80–99)
NRBC # BLD: 0 K/UL (ref 0–0.01)
NRBC BLD-RTO: 0 PER 100 WBC
PLATELET # BLD AUTO: 282 K/UL (ref 150–400)
PMV BLD AUTO: 10.4 FL (ref 8.9–12.9)
POTASSIUM SERPL-SCNC: 3.7 MMOL/L (ref 3.5–5.1)
RBC # BLD AUTO: 5.05 M/UL (ref 4.1–5.7)
SODIUM SERPL-SCNC: 141 MMOL/L (ref 136–145)
TROPONIN I SERPL-MCNC: <0.05 NG/ML
WBC # BLD AUTO: 7.3 K/UL (ref 4.1–11.1)

## 2019-06-30 PROCEDURE — 84484 ASSAY OF TROPONIN QUANT: CPT

## 2019-06-30 PROCEDURE — 83880 ASSAY OF NATRIURETIC PEPTIDE: CPT

## 2019-06-30 PROCEDURE — 36415 COLL VENOUS BLD VENIPUNCTURE: CPT

## 2019-06-30 PROCEDURE — 85027 COMPLETE CBC AUTOMATED: CPT

## 2019-06-30 PROCEDURE — 80048 BASIC METABOLIC PNL TOTAL CA: CPT

## 2019-06-30 PROCEDURE — 99284 EMERGENCY DEPT VISIT MOD MDM: CPT

## 2019-06-30 PROCEDURE — 74011250637 HC RX REV CODE- 250/637: Performed by: EMERGENCY MEDICINE

## 2019-06-30 PROCEDURE — 93005 ELECTROCARDIOGRAM TRACING: CPT

## 2019-06-30 RX ORDER — LISINOPRIL 20 MG/1
20 TABLET ORAL
Status: COMPLETED | OUTPATIENT
Start: 2019-06-30 | End: 2019-06-30

## 2019-06-30 RX ORDER — LISINOPRIL 20 MG/1
20 TABLET ORAL DAILY
Qty: 30 TAB | Refills: 0 | Status: SHIPPED | OUTPATIENT
Start: 2019-06-30 | End: 2019-07-08 | Stop reason: SDUPTHER

## 2019-06-30 RX ADMIN — LISINOPRIL 20 MG: 20 TABLET ORAL at 16:29

## 2019-06-30 NOTE — ED NOTES
Assumed care from triage. Patient comes to the ED complaining of being dizzy and having elevated BP. Patient states that it has been going on for awhile but today he noticed his BP was elevated with a mild headache.

## 2019-06-30 NOTE — ED PROVIDER NOTES
EMERGENCY DEPARTMENT HISTORY AND PHYSICAL EXAM      Date: 6/30/2019  Patient Name: Lisa Mckeon. History of Presenting Illness     Chief Complaint   Patient presents with    Hypertension     complains of hypertension 202/106    Dizziness     intermittent times several months       History Provided By: Patient and Patient's Wife    HPI: Lisa Amin, 61 y.o. male  presents to the ED with cc of high blood pressure. Patient was sent from [facility] for hypertension of 202/106. He does describe some dizziness and lightheadedness that has been intermittent for several months. He noticed high blood pressure as far back as January 2019 when he was seen in urgent care for a sinus problem. In March 2019 his wife says his blood pressure was elevated then as well. He is currently not on any medications for his high blood pressure. He has not seen his primary care doctor for a physical since last year. Denies chest pain. Has occasional shortness of breath. Also describe some swelling around his ankles. No urinary complaints. He has frequent loose stools and diarrhea due to a history of Crohn's disease. Also has some nausea associated with this at times. No headache. There are no other complaints, changes, or physical findings at this time. PCP: Nayana Bush MD    No current facility-administered medications on file prior to encounter. Current Outpatient Medications on File Prior to Encounter   Medication Sig Dispense Refill    zolpidem CR (AMBIEN CR) 12.5 mg tablet TAKE 1 TABLET BY MOUTH AT BEDTIME  3    Dexlansoprazole (DEXILANT) 60 mg CpDB Take  by mouth.  diphenoxylate-atropine (LOMOTIL) 2.5-0.025 mg per tablet Take  by mouth four (4) times daily as needed for Diarrhea.  traZODone (DESYREL) 50 mg tablet Take  by mouth nightly.       DULoxetine (CYMBALTA) 60 mg capsule TAKE 1 CAPSULE BY MOUTH EVERY DAY 90 Cap 3       Past History     Past Medical History:  Past Medical History:   Diagnosis Date    Chronic insomnia 8/16/2018    Colon polyps 8/14/2018    Crohn's disease ([facility] [address] 8/14/2018    Depression 8/14/2018    Diverticulosis 8/14/2018    Fibromyalgia 8/14/2018    Gastroesophageal reflux disease without esophagitis 8/16/2018    IBS (irritable bowel syndrome) 8/14/2018    Onychomycosis 8/14/2018    Reactive arthritis ([facility] [address] 8/14/2018    Sarcoidosis 8/14/2018       Past Surgical History:  Past Surgical History:   Procedure Laterality Date    HX ORTHOPAEDIC      L3-5 lumbar surgery    HX TONSILLECTOMY         Family History:  Family History   Problem Relation Age of Onset    Hypertension Mother     Hypertension Father     Prostate Cancer Father     Hypertension Brother     Hypertension Paternal Grandfather     Colon Cancer Paternal Grandfather     Colon Cancer Paternal Uncle        Social History:  Social History     Tobacco Use    Smoking status: Current Every Day Smoker     Packs/day: 1.50    Smokeless tobacco: Never Used   Substance Use Topics    Alcohol use: No    Drug use: Not on file       Allergies: Allergies   Allergen Reactions    Remicade [Infliximab] Other (comments)         Review of Systems   Review of Systems   Constitutional: Negative for chills and fever. HENT: Negative for congestion, ear pain, rhinorrhea, sore throat and trouble swallowing. Eyes: Negative for visual disturbance. Respiratory: Positive for shortness of breath. Negative for cough and chest tightness. Cardiovascular: Negative for chest pain and palpitations. Gastrointestinal: Negative for abdominal pain, blood in stool, constipation, diarrhea, nausea and vomiting. Genitourinary: Negative for decreased urine volume, difficulty urinating, dysuria and frequency. Musculoskeletal: Negative for back pain and neck pain. Skin: Negative for color change and rash. Neurological: Positive for dizziness and light-headedness. Negative for weakness and headaches. Physical Exam   Physical Exam   Constitutional: He is oriented to person, place, and time. He appears well-developed and well-nourished. He does not appear ill. No distress. HENT:   Mouth/Throat: Oropharynx is clear and moist.   Eyes: Conjunctivae are normal.   Neck: Neck supple. Cardiovascular: Normal rate and regular rhythm. Pulmonary/Chest: Effort normal and breath sounds normal. No accessory muscle usage. No respiratory distress. Abdominal: Soft. He exhibits no distension. There is no tenderness. Lymphadenopathy:     He has no cervical adenopathy. Neurological: He is alert and oriented to person, place, and time. He has normal strength. No cranial nerve deficit or sensory deficit. Skin: Skin is warm and dry. Nursing note and vitals reviewed. Diagnostic Study Results     Labs -     Recent Results (from the past 24 hour(s))   EKG, 12 LEAD, INITIAL    Collection Time: 06/30/19  3:01 PM   Result Value Ref Range    Ventricular Rate 78 BPM    Atrial Rate 78 BPM    P-R Interval 146 ms    QRS Duration 94 ms    Q-T Interval 408 ms    QTC Calculation (Bezet) 465 ms    Calculated P Axis 0 degrees    Calculated R Axis -12 degrees    Calculated T Axis 54 degrees    Diagnosis       Normal sinus rhythm  Normal ECG  No previous ECGs available     METABOLIC PANEL, BASIC    Collection Time: 06/30/19  3:34 PM   Result Value Ref Range    Sodium 141 136 - 145 mmol/L    Potassium 3.7 3.5 - 5.1 mmol/L    Chloride 111 (H) 97 - 108 mmol/L    CO2 24 21 - 32 mmol/L    Anion gap 6 5 - 15 mmol/L    Glucose 129 (H) 65 - 100 mg/dL    BUN 15 6 - 20 MG/DL    Creatinine 0.99 0.70 - 1.30 MG/DL    BUN/Creatinine ratio 15 12 - 20      GFR est AA >60 >60 ml/min/1.73m2    GFR est non-AA >60 >60 ml/min/1.73m2    Calcium 8.6 8.5 - 10.1 MG/DL   CBC W/O DIFF    Collection Time: 06/30/19  3:34 PM   Result Value Ref Range    WBC 7.3 4.1 - 11.1 K/uL    RBC 5.05 4. 10 - 5.70 M/uL    HGB 15.3 12.1 - 17.0 g/dL    HCT 44.8 36.6 - 50.3 %    MCV 88.7 80.0 - 99.0 FL    MCH 30.3 26.0 - 34.0 PG    MCHC 34.2 30.0 - 36.5 g/dL    RDW 12.7 11.5 - 14.5 %    PLATELET 661 519 - 312 K/uL    MPV 10.4 8.9 - 12.9 FL    NRBC 0.0 0  WBC    ABSOLUTE NRBC 0.00 0.00 - 0.01 K/uL   NT-PRO BNP    Collection Time: 06/30/19  3:34 PM   Result Value Ref Range    NT pro- (H) <125 PG/ML   TROPONIN I    Collection Time: 06/30/19  3:34 PM   Result Value Ref Range    Troponin-I, Qt. <0.05 <0.05 ng/mL       Radiologic Studies -   No orders to display     CT Results  (Last 48 hours)    None        CXR Results  (Last 48 hours)    None            Medical Decision Making   I am the first provider for this patient. I reviewed the vital signs, available nursing notes, past medical history, past surgical history, family history and social history. Vital Signs-Reviewed the patient's vital signs. Patient Vitals for the past 24 hrs:   Temp Pulse Resp BP SpO2   06/30/19 1700  82  (!) 163/97 100 %   06/30/19 1615    (!) 153/111 95 %   06/30/19 1515    (!) 158/110 93 %   06/30/19 1455 97.9 °F (36.6 °C) 82 16 (!) 181/113 97 %       EKG interpretation: (Preliminary)  Rhythm: normal sinus rhythm; and regular . Rate (approx.): 82; Axis: normal; DE interval: normal; QRS interval: normal ; ST/T wave: normal.    Records Reviewed: Nursing Notes and Old Medical Records    Provider Notes (Medical Decision Making):   Patient presents with hypertension. He has a strong family history of high blood pressure. He has had some shortness of breath but his BNP and troponin look okay. I do not think he is in any heart failure. Kidney function is normal.  We will start him on lisinopril daily. He needs to follow-up with his primary care doctor in about a week for blood pressure checks. Return to the emergency department if have any severe headaches visual symptoms or chest pain. ED Course:   Initial assessment performed.  The patients presenting problems have been discussed, and they are in agreement with the care plan formulated and outlined with them. I have encouraged them to ask questions as they arise throughout their visit. ED Course as of Jun 30 1718   Sun Jun 30, 2019 1710 Patient's blood pressure is now 163/97. He is feeling better. Reviewed labs and results. [WM]      ED Course User Index  [WM] Honey Dumont MD       Orders Placed This Encounter    BASIC METABOLIC PANEL    CBC W/O DIFF    PRO-BNP    TROPONIN I    EKG 12 LEAD INITIAL    lisinopril (PRINIVIL, ZESTRIL) tablet 20 mg    lisinopril (PRINIVIL, ZESTRIL) 20 mg tablet         Critical Care Time:   0    Disposition:  Discharge  5:19 PM    The patient's emergency department evaluation is now complete. I have reviewed all labs, imaging, and pertinent information. I have discussed all results with the patient and/or family. Based on our evaluation today I do believe that the patient is safe to be discharged home. The patient has been provided with at home instructions that are pertinent to their complaint today, although these may not be specific to the exact diagnosis. I have reviewed the patient's home medications and attempted to reconcile if not already done so by pharmacy or nursing staff. I have discussed all new prescriptions with the patient. The patient has been encouraged to follow-up with primary care doctor and/or specialist, and these have been discussed with the patient. The patient has been advised that they may return to the emergency department if they have any worsening symptoms and or new symptoms that are of concern to them. Verbal discharge instructions may have also been provided to the patient that may not be specifically contained in the written discharge instructions. The patient has been given opportunity to ask questions prior to discharge. PLAN:  1.    Current Discharge Medication List      START taking these medications    Details   lisinopril (PRINIVIL, ZESTRIL) 20 mg tablet Take 1 Tab by mouth daily. Qty: 30 Tab, Refills: 0           2. Follow-up Information     Follow up With Specialties Details Why Contact Info    Libertad Vidal MD Internal Medicine Schedule an appointment as soon as possible for a visit in 1 week  [address] [place] [address] Rd  [phone]          Return to ED if worse     Diagnosis     Clinical Impression:   1. Hypertensive urgency    2. Essential hypertension            This note will not be viewable in MyChart.

## 2019-07-01 LAB
ATRIAL RATE: 78 BPM
CALCULATED P AXIS, ECG09: 0 DEGREES
CALCULATED R AXIS, ECG10: -12 DEGREES
CALCULATED T AXIS, ECG11: 54 DEGREES
DIAGNOSIS, 93000: NORMAL
P-R INTERVAL, ECG05: 146 MS
Q-T INTERVAL, ECG07: 408 MS
QRS DURATION, ECG06: 94 MS
QTC CALCULATION (BEZET), ECG08: 465 MS
VENTRICULAR RATE, ECG03: 78 BPM

## 2019-07-08 ENCOUNTER — OFFICE VISIT (OUTPATIENT)
Dept: INTERNAL MEDICINE CLINIC | Age: 59
End: 2019-07-08

## 2019-07-08 VITALS
RESPIRATION RATE: 16 BRPM | TEMPERATURE: 98.2 F | HEIGHT: 72 IN | WEIGHT: 201 LBS | BODY MASS INDEX: 27.22 KG/M2 | DIASTOLIC BLOOD PRESSURE: 100 MMHG | HEART RATE: 91 BPM | SYSTOLIC BLOOD PRESSURE: 156 MMHG | OXYGEN SATURATION: 96 %

## 2019-07-08 DIAGNOSIS — I10 ESSENTIAL HYPERTENSION: Primary | Chronic | ICD-10-CM

## 2019-07-08 RX ORDER — LISINOPRIL 20 MG/1
40 TABLET ORAL DAILY
Qty: 30 TAB | Refills: 0
Start: 2019-07-08 | End: 2019-07-25 | Stop reason: SDUPTHER

## 2019-07-08 NOTE — PROGRESS NOTES
Luh Menchaca is a 61 y.o. male presenting for Foot Pain  . 1. Have you been to the ER, urgent care clinic since your last visit? Hospitalized since your last visit? Yes When: 6/30/2019 Where: [facility] Reason for visit: hypertension    2. Have you seen or consulted any other health care providers outside of the [address] [place] [address] since your last visit? Include any pap smears or colon screening. No    No flowsheet data found. Abuse Screening Questionnaire 7/8/2019   Do you ever feel afraid of your partner? N   Are you in a relationship with someone who physically or mentally threatens you? N   Is it safe for you to go home? Y       3 most recent PHQ Screens 7/8/2019   Little interest or pleasure in doing things Not at all   Feeling down, depressed, irritable, or hopeless Not at all   Total Score PHQ 2 0   Trouble falling or staying asleep, or sleeping too much Several days   Feeling tired or having little energy More than half the days   Poor appetite, weight loss, or overeating Several days   Feeling bad about yourself - or that you are a failure or have let yourself or your family down Not at all   Trouble concentrating on things such as school, work, reading, or watching TV Several days   Moving or speaking so slowly that other people could have noticed; or the opposite being so fidgety that others notice Several days   Thoughts of being better off dead, or hurting yourself in some way Not at all   PHQ 9 Score 6   How difficult have these problems made it for you to do your work, take care of your home and get along with others Somewhat difficult       There are no discontinued medications.

## 2019-07-08 NOTE — PROGRESS NOTES
This note will not be viewable in [address] Subjective:     Devin Coulter presents to the office today in follow-up of his hypertension. Approximately 2 weeks ago on Sunday he was going to [employer] but felt extremely dizzy. This persisted and he was taken to the [facility] urgent care center where he was found to have extremely high blood pressure and sent to the emergency room. A CBC BMP and EKG were normal and the patient was started on lisinopril 20 mg a day. The patient has a very strong family history of hypertension in multiple family members and by his own admission liberally salts all the food that he eats. Patient denies any headaches, numbness, tingling or focal neurological problems. Past Medical History:   Diagnosis Date    Chronic insomnia 8/16/2018    Colon polyps 8/14/2018    Crohn's disease ([facility] [address] 8/14/2018    Depression 8/14/2018    Diverticulosis 8/14/2018    Fibromyalgia 8/14/2018    Gastroesophageal reflux disease without esophagitis 8/16/2018    IBS (irritable bowel syndrome) 8/14/2018    Onychomycosis 8/14/2018    Reactive arthritis ([facility] 8/14/2018    Sarcoidosis 8/14/2018     Past Surgical History:   Procedure Laterality Date    HX ORTHOPAEDIC      L3-5 lumbar surgery    HX TONSILLECTOMY       Allergies   Allergen Reactions    Remicade [Infliximab] Other (comments)     Current Outpatient Medications   Medication Sig Dispense Refill    lisinopril (PRINIVIL, ZESTRIL) 20 mg tablet Take 1 Tab by mouth daily. 30 Tab 0    zolpidem CR (AMBIEN CR) 12.5 mg tablet TAKE 1 TABLET BY MOUTH AT BEDTIME  3    Dexlansoprazole (DEXILANT) 60 mg CpDB Take  by mouth.  diphenoxylate-atropine (LOMOTIL) 2.5-0.025 mg per tablet Take  by mouth four (4) times daily as needed for Diarrhea.  traZODone (DESYREL) 50 mg tablet Take  by mouth nightly.       DULoxetine (CYMBALTA) 60 mg capsule TAKE 1 CAPSULE BY MOUTH EVERY DAY 90 Cap 3     Social History     Socioeconomic History    Marital status:      Spouse name: Not on file    Number of children: Not on file    Years of education: Not on file    Highest education level: Not on file   Occupational History    Occupation: [employer]   Tobacco Use    Smoking status: Current Every Day Smoker     Packs/day: 1.50    Smokeless tobacco: Never Used   Substance and Sexual Activity    Alcohol use: No    Drug use: Never     Family History   Problem Relation Age of Onset    Hypertension Mother     Hypertension Father     Prostate Cancer Father     Hypertension Brother     Hypertension Paternal Grandfather     Colon Cancer Paternal Grandfather     Colon Cancer Paternal Uncle        Review of Systems:  GEN: no weight loss, weight gain, fatigue or night sweats  CV: no PND, orthopnea, or palpitations  Resp: no dyspnea on exertion, no cough  Abd: no nausea, vomiting or diarrhea  EXT: denies edema, claudication  Endocrine: no hair loss, excessive thirst or polyuria  Neurological ROS: no TIA or stroke symptoms  ROS otherwise negative      Objective:     Visit Vitals  BP (!) 156/100   Pulse 91   Temp 98.2 °F (36.8 °C) (Oral)   Resp 16   Ht 6' (1.829 m)   Wt 201 lb (91.2 kg)   SpO2 96%   BMI 27.26 kg/m²     Body mass index is 27.26 kg/m². General:   alert, cooperative and no distress   Eyes: conjunctivae/sclerae clear. PERRL, EOM's intact   Mouth:  No oral lesions, no pharyngeal erythema, no exudates   Neck: Trachea midline, no thyromegaly, no bruits   Heart: S1 and S2 normal,no murmurs noted    Lungs: Clear to auscultation bilaterally, no increased work of breathing   Abdomen: Soft, nontender.   Normal bowel sounds   Extremities: No edema or cyanosis   Neuro: ..alert, oriented x3,speech normal in context and clarity, cranial nerves II-XII intact,motor strength: full proximally and distally,gait: normal  reflexes: full and symmetric     Physical exam otherwise negative         Assessment/Plan:     Diagnoses and all orders for this visit:    Essential hypertension        Other instructions:   Patient's medications were reviewed and reconciled. Body mass index is 27.3 and dietary counseling along with printed patient education is given    Blood pressure remains poorly controlled and we will increase lisinopril to 40 mg daily    He is not to add any salt to his food. Return in 2 weeks time for recheck    Follow-up and Dispositions    · Return in about 2 weeks (around 7/22/2019).          Macarena Reinoso MD

## 2019-07-08 NOTE — PATIENT INSTRUCTIONS

## 2019-07-25 ENCOUNTER — OFFICE VISIT (OUTPATIENT)
Dept: INTERNAL MEDICINE CLINIC | Age: 59
End: 2019-07-25

## 2019-07-25 VITALS
DIASTOLIC BLOOD PRESSURE: 84 MMHG | HEART RATE: 85 BPM | WEIGHT: 199 LBS | HEIGHT: 72 IN | SYSTOLIC BLOOD PRESSURE: 124 MMHG | TEMPERATURE: 98.2 F | BODY MASS INDEX: 26.95 KG/M2 | OXYGEN SATURATION: 97 %

## 2019-07-25 DIAGNOSIS — I10 ESSENTIAL HYPERTENSION: Primary | Chronic | ICD-10-CM

## 2019-07-25 RX ORDER — LISINOPRIL 20 MG/1
20 TABLET ORAL DAILY
Qty: 90 TAB | Refills: 0 | Status: SHIPPED | OUTPATIENT
Start: 2019-07-25 | End: 2019-10-01 | Stop reason: SDUPTHER

## 2019-07-25 NOTE — PROGRESS NOTES
This note will not be viewable in [address] Subjective:     Denilson Sherman returns to the office today in follow-up of his hypertension. At the last office visit his lisinopril was increased from 20 to 40 mg and he was placed on a restrictive salt diet. His blood pressure came down nicely according to the readings that his wife brings to the office today. The patient ran out of his lisinopril 4 or 5 days ago and then began to take 1 of his wife's 20/25 mg lisinopril HCT pills. He has not felt as well and has had some dizziness and weakness associated with this medication. Past Medical History:   Diagnosis Date    Chronic insomnia 8/16/2018    Colon polyps 8/14/2018    Crohn's disease ([facility] [address] 8/14/2018    Depression 8/14/2018    Diverticulosis 8/14/2018    Fibromyalgia 8/14/2018    Gastroesophageal reflux disease without esophagitis 8/16/2018    IBS (irritable bowel syndrome) 8/14/2018    Onychomycosis 8/14/2018    Reactive arthritis ([facility] [address] [address] 8/14/2018    Sarcoidosis 8/14/2018     Past Surgical History:   Procedure Laterality Date    HX ORTHOPAEDIC      L3-5 lumbar surgery    HX TONSILLECTOMY       Allergies   Allergen Reactions    Remicade [Infliximab] Other (comments)     Current Outpatient Medications   Medication Sig Dispense Refill    lisinopril (PRINIVIL, ZESTRIL) 20 mg tablet Take 1 Tab by mouth daily. 90 Tab 0    zolpidem CR (AMBIEN CR) 12.5 mg tablet TAKE 1 TABLET BY MOUTH AT BEDTIME  3    Dexlansoprazole (DEXILANT) 60 mg CpDB Take  by mouth.  diphenoxylate-atropine (LOMOTIL) 2.5-0.025 mg per tablet Take  by mouth four (4) times daily as needed for Diarrhea.  traZODone (DESYREL) 50 mg tablet Take  by mouth nightly.       DULoxetine (CYMBALTA) 60 mg capsule TAKE 1 CAPSULE BY MOUTH EVERY DAY 90 Cap 3     Social History     Socioeconomic History    Marital status:      Spouse name: Not on file    Number of children: Not on file    Years of education: Not on file    Highest education level: Not on file   Occupational History    Occupation: refinishes furniture   Tobacco Use    Smoking status: Current Every Day Smoker     Packs/day: 1.50    Smokeless tobacco: Never Used   Substance and Sexual Activity    Alcohol use: No    Drug use: Never     Family History   Problem Relation Age of Onset    Hypertension Mother     Hypertension Father     Prostate Cancer Father     Hypertension Brother     Hypertension Paternal Grandfather     Colon Cancer Paternal Grandfather     Colon Cancer Paternal Uncle        Review of Systems:  GEN: no weight loss, weight gain, fatigue or night sweats  CV: no PND, orthopnea, or palpitations  Resp: no dyspnea on exertion, no cough  Abd: no nausea, vomiting or diarrhea  EXT: denies edema, claudication  Endocrine: no hair loss, excessive thirst or polyuria  Neurological ROS: no TIA or stroke symptoms  ROS otherwise negative      Objective:     Visit Vitals  /84 (BP 1 Location: Right arm, BP Patient Position: Sitting)   Pulse 85   Temp 98.2 °F (36.8 °C) (Oral)   Ht 6' (1.829 m)   Wt 199 lb (90.3 kg)   SpO2 97%   BMI 26.99 kg/m²     Body mass index is 26.99 kg/m². General:   alert, cooperative and no distress   Eyes: conjunctivae/sclerae clear. PERRL, EOM's intact   Mouth:  No oral lesions, no pharyngeal erythema, no exudates   Neck: Trachea midline, no thyromegaly, no bruits   Heart: S1 and S2 normal,no murmurs noted    Lungs: Clear to auscultation bilaterally, no increased work of breathing   Abdomen: Soft, nontender. Normal bowel sounds   Extremities: No edema or cyanosis   Neuro: ..alert, oriented x3,speech normal in context and clarity, cranial nerves II-XII intact,motor strength: full proximally and distally,gait: normal  reflexes: full and symmetric     Physical exam otherwise negative         Assessment/Plan:     Diagnoses and all orders for this visit:    Essential hypertension  -     lisinopril (PRINIVIL, ZESTRIL) 20 mg tablet;  Take 1 Tab by mouth daily. , Normal, Disp-90 Tab, R-0        Other instructions: The patient's medications were reviewed and reconciled. His blood pressure log is reviewed. Will reduce lisinopril to 20 mg.  I believe that since he is not restricting his salt that his blood pressure medication will work better. His wife will keep a log of his blood pressure recordings and should it begin to rise again we may need to restart the 40 mg dose. He has an appointment for follow-up in 1 month for a checkup and we will see how he is doing at that time. Follow-up and Dispositions    · Return for As previously scheduled.          Tez Faustin MD

## 2019-07-25 NOTE — PATIENT INSTRUCTIONS
High Blood Pressure: Care Instructions  Overview    It's normal for blood pressure to go up and down throughout the day. But if it stays up, you have high blood pressure. Another name for high blood pressure is hypertension. Despite what a lot of people think, high blood pressure usually doesn't cause headaches or make you feel dizzy or lightheaded. It usually has no symptoms. But it does increase your risk of stroke, heart attack, and other problems. You and your doctor will talk about your risks of these problems based on your blood pressure. Your doctor will give you a goal for your blood pressure. Your goal will be based on your health and your age. Lifestyle changes, such as eating healthy and being active, are always important to help lower blood pressure. You might also take medicine to reach your blood pressure goal.  Follow-up care is a key part of your treatment and safety. Be sure to make and go to all appointments, and call your doctor if you are having problems. It's also a good idea to know your test results and keep a list of the medicines you take. How can you care for yourself at home? Medical treatment  · If you stop taking your medicine, your blood pressure will go back up. You may take one or more types of medicine to lower your blood pressure. Be safe with medicines. Take your medicine exactly as prescribed. Call your doctor if you think you are having a problem with your medicine. · Talk to your doctor before you start taking aspirin every day. Aspirin can help certain people lower their risk of a heart attack or stroke. But taking aspirin isn't right for everyone, because it can cause serious bleeding. · See your doctor regularly. You may need to see the doctor more often at first or until your blood pressure comes down. · If you are taking blood pressure medicine, talk to your doctor before you take decongestants or anti-inflammatory medicine, such as ibuprofen.  Some of these medicines can raise blood pressure. · Learn how to check your blood pressure at home. Lifestyle changes  · Stay at a healthy weight. This is especially important if you put on weight around the waist. Losing even 10 pounds can help you lower your blood pressure. · If your doctor recommends it, get more exercise. Walking is a good choice. Bit by bit, increase the amount you walk every day. Try for at least 30 minutes on most days of the week. You also may want to swim, bike, or do other activities. · Avoid or limit alcohol. Talk to your doctor about whether you can drink any alcohol. · Try to limit how much sodium you eat to less than 2,300 milligrams (mg) a day. Your doctor may ask you to try to eat less than 1,500 mg a day. · Eat plenty of fruits (such as bananas and oranges), vegetables, legumes, whole grains, and low-fat dairy products. · Lower the amount of saturated fat in your diet. Saturated fat is found in animal products such as milk, cheese, and meat. Limiting these foods may help you lose weight and also lower your risk for heart disease. · Do not smoke. Smoking increases your risk for heart attack and stroke. If you need help quitting, talk to your doctor about stop-smoking programs and medicines. These can increase your chances of quitting for good. When should you call for help? Call 911 anytime you think you may need emergency care. This may mean having symptoms that suggest that your blood pressure is causing a serious heart or blood vessel problem. Your blood pressure may be over 180/120.   For example, call 911 if:    · You have symptoms of a heart attack. These may include:  ? Chest pain or pressure, or a strange feeling in the chest.  ? Sweating. ? Shortness of breath. ? Nausea or vomiting. ? Pain, pressure, or a strange feeling in the back, neck, jaw, or upper belly or in one or both shoulders or arms. ? Lightheadedness or sudden weakness.   ? A fast or irregular heartbeat.     · You have symptoms of a stroke. These may include:  ? Sudden numbness, tingling, weakness, or loss of movement in your face, arm, or leg, especially on only one side of your body. ? Sudden vision changes. ? Sudden trouble speaking. ? Sudden confusion or trouble understanding simple statements. ? Sudden problems with walking or balance. ? A sudden, severe headache that is different from past headaches.     · You have severe back or belly pain.    Do not wait until your blood pressure comes down on its own. Get help right away.   Call your doctor now or seek immediate care if:    · Your blood pressure is much higher than normal (such as 180/120 or higher), but you don't have symptoms.     · You think high blood pressure is causing symptoms, such as:  ? Severe headache.  ? Blurry vision.    Watch closely for changes in your health, and be sure to contact your doctor if:    · Your blood pressure measures higher than your doctor recommends at least 2 times. That means the top number is higher or the bottom number is higher, or both.     · You think you may be having side effects from your blood pressure medicine. Where can you learn more? Go to http://mane-evelio.info/. Enter X492 in the search box to learn more about \"High Blood Pressure: Care Instructions. \"  Current as of: July 22, 2018  Content Version: 12.1  © 4042-2645 [employer]. Care instructions adapted under license by [employer] (which disclaims liability or warranty for this information). If you have questions about a medical condition or this instruction, always ask your healthcare professional. [place] [address] any warranty or liability for your use of this information.

## 2019-07-25 NOTE — PROGRESS NOTES
Lisa Salinas. is a 61 y.o. male presenting for Hypertension (2 week fu)  . 1. Have you been to the ER, urgent care clinic since your last visit? Hospitalized since your last visit? No    2. Have you seen or consulted any other health care providers outside of the [address] [place] [address] since your last visit? Include any pap smears or colon screening. No    No flowsheet data found. Abuse Screening Questionnaire 7/8/2019   Do you ever feel afraid of your partner? N   Are you in a relationship with someone who physically or mentally threatens you? N   Is it safe for you to go home? Y       3 most recent PHQ Screens 7/8/2019   Little interest or pleasure in doing things Not at all   Feeling down, depressed, irritable, or hopeless Not at all   Total Score PHQ 2 0   Trouble falling or staying asleep, or sleeping too much Several days   Feeling tired or having little energy More than half the days   Poor appetite, weight loss, or overeating Several days   Feeling bad about yourself - or that you are a failure or have let yourself or your family down Not at all   Trouble concentrating on things such as school, work, reading, or watching TV Several days   Moving or speaking so slowly that other people could have noticed; or the opposite being so fidgety that others notice Several days   Thoughts of being better off dead, or hurting yourself in some way Not at all   PHQ 9 Score 6   How difficult have these problems made it for you to do your work, take care of your home and get along with others Somewhat difficult       There are no discontinued medications.

## 2019-08-20 ENCOUNTER — OFFICE VISIT (OUTPATIENT)
Dept: INTERNAL MEDICINE CLINIC | Age: 59
End: 2019-08-20

## 2019-08-20 VITALS
HEIGHT: 72 IN | HEART RATE: 74 BPM | DIASTOLIC BLOOD PRESSURE: 84 MMHG | TEMPERATURE: 98 F | BODY MASS INDEX: 27.41 KG/M2 | SYSTOLIC BLOOD PRESSURE: 128 MMHG | RESPIRATION RATE: 20 BRPM | OXYGEN SATURATION: 96 % | WEIGHT: 202.4 LBS

## 2019-08-20 DIAGNOSIS — F51.04 CHRONIC INSOMNIA: Chronic | ICD-10-CM

## 2019-08-20 DIAGNOSIS — M02.30 REACTIVE ARTHRITIS (HCC): Chronic | ICD-10-CM

## 2019-08-20 DIAGNOSIS — F33.0 DEPRESSION, MAJOR, RECURRENT, MILD (HCC): ICD-10-CM

## 2019-08-20 DIAGNOSIS — I10 ESSENTIAL HYPERTENSION: Chronic | ICD-10-CM

## 2019-08-20 DIAGNOSIS — K50.919 CROHN'S DISEASE WITH COMPLICATION, UNSPECIFIED GASTROINTESTINAL TRACT LOCATION (HCC): Chronic | ICD-10-CM

## 2019-08-20 DIAGNOSIS — Z00.00 ROUTINE PHYSICAL EXAMINATION: Primary | ICD-10-CM

## 2019-08-20 DIAGNOSIS — K21.9 GASTROESOPHAGEAL REFLUX DISEASE WITHOUT ESOPHAGITIS: Chronic | ICD-10-CM

## 2019-08-20 PROBLEM — F32.A DEPRESSION: Chronic | Status: ACTIVE | Noted: 2018-08-14

## 2019-08-20 PROBLEM — K50.90 CROHN'S DISEASE (HCC): Chronic | Status: ACTIVE | Noted: 2018-08-14

## 2019-08-20 LAB
A-G RATIO,AGRAT: 1.4 RATIO
ALBUMIN SERPL-MCNC: 4 G/DL (ref 3.9–5.4)
ALP SERPL-CCNC: 122 U/L (ref 38–126)
ALT SERPL-CCNC: 32 U/L (ref 0–50)
ANION GAP SERPL CALC-SCNC: 11 MMOL/L
AST SERPL W P-5'-P-CCNC: 29 U/L (ref 14–36)
BILIRUB SERPL-MCNC: 0.3 MG/DL (ref 0.2–1.3)
BILIRUB UR QL: NEGATIVE
BUN SERPL-MCNC: 20 MG/DL (ref 9–20)
BUN/CREATININE RATIO,BUCR: 25 RATIO
CALCIUM SERPL-MCNC: 9.4 MG/DL (ref 8.4–10.2)
CHLORIDE SERPL-SCNC: 103 MMOL/L (ref 98–107)
CHOL/HDL RATIO,CHHD: 4 RATIO (ref 0–4)
CHOLEST SERPL-MCNC: 171 MG/DL (ref 0–200)
CLARITY: CLEAR
CO2 SERPL-SCNC: 26 MMOL/L (ref 22–32)
COLOR UR: NORMAL
CREAT SERPL-MCNC: 0.8 MG/DL (ref 0.8–1.5)
GLOBULIN,GLOB: 2.8
GLUCOSE 24H UR-MRATE: NEGATIVE G/(24.H)
GLUCOSE SERPL-MCNC: 88 MG/DL (ref 75–110)
HDLC SERPL-MCNC: 41 MG/DL (ref 35–130)
HGB UR QL STRIP: NEGATIVE
KETONES UR QL STRIP.AUTO: NEGATIVE
LDL/HDL RATIO,LDHD: 3 RATIO
LDLC SERPL CALC-MCNC: 115 MG/DL (ref 0–130)
LEUKOCYTE ESTERASE: NEGATIVE
NITRITE UR QL STRIP.AUTO: NEGATIVE
PH UR STRIP: 6 [PH] (ref 5–7)
POTASSIUM SERPL-SCNC: 4.4 MMOL/L (ref 3.6–5)
PROT SERPL-MCNC: 6.8 G/DL (ref 6.3–8.2)
PROT UR STRIP-MCNC: NEGATIVE MG/DL
RBC #/AREA URNS HPF: 0 #/HPF
SODIUM SERPL-SCNC: 140 MMOL/L (ref 137–145)
SP GR UR REFRACTOMETRY: 1.01 (ref 1–1.03)
TRIGL SERPL-MCNC: 76 MG/DL (ref 0–200)
URATE SERPL-MCNC: 5.5 MG/DL (ref 3.5–8.5)
UROBILINOGEN UR QL STRIP.AUTO: NEGATIVE
VLDLC SERPL CALC-MCNC: 15 MG/DL
WBC URNS QL MICRO: 0 #/HPF

## 2019-08-20 NOTE — PROGRESS NOTES
Kimber Cordero. is a 61 y.o. male presenting for Complete Physical  .     1. Have you been to the ER, urgent care clinic since your last visit? Hospitalized since your last visit? No    2. Have you seen or consulted any other health care providers outside of the [address] [place] [address] since your last visit? Include any pap smears or colon screening. No    No flowsheet data found. Abuse Screening Questionnaire 7/8/2019   Do you ever feel afraid of your partner? N   Are you in a relationship with someone who physically or mentally threatens you? N   Is it safe for you to go home? Y       3 most recent PHQ Screens 7/8/2019   Little interest or pleasure in doing things Not at all   Feeling down, depressed, irritable, or hopeless Not at all   Total Score PHQ 2 0   Trouble falling or staying asleep, or sleeping too much Several days   Feeling tired or having little energy More than half the days   Poor appetite, weight loss, or overeating Several days   Feeling bad about yourself - or that you are a failure or have let yourself or your family down Not at all   Trouble concentrating on things such as school, work, reading, or watching TV Several days   Moving or speaking so slowly that other people could have noticed; or the opposite being so fidgety that others notice Several days   Thoughts of being better off dead, or hurting yourself in some way Not at all   PHQ 9 Score 6   How difficult have these problems made it for you to do your work, take care of your home and get along with others Somewhat difficult       There are no discontinued medications.

## 2019-08-20 NOTE — LETTER
Name:Angelika Tinajero. AFO:[birth year] MR #:[id number] Provider Dashawn Vega MD  
*[id number]* BSMG-491 (5/16) Page 1 of 5 Initial [employer] CONTROLLED SUBSTANCE AGREEMENT I may be prescribed medications that are controlled substances as part  of my treatment plan for management of my medical condition(s). The goal of my treatment plan is to maintain and/or improve my health and wellbeing. Because controlled substances have an increased risk of abuse or harm, continual re-evaluation is needed determine if the goals of my treatment plan are being met for my safety and the safety of others. Serenity Mejias. am entering into this Controlled Substance Agreement with my provider, Sammy Barlow MD at [address] [place] [address] . I understand that successful treatment requires mutual trust and honesty between me and my provider. I understand that there are state and federal laws and regulations which apply to the medications that my provider may prescribe that must be followed. I understand there are risks and benefits ts of taking the medicines that my provider may prescribe. I understand and agree that following this Agreement is necessary in continuing my provider-patient relationship and success of my treatment plan. As a part of my treatment plan, I agree to the following: COMMUNICATION: 
 
1. I will communicate fully with my provider about my medical condition(s), including the effect on my daily life and how well my medications are helping. I will tell my provider all of the medications that I take for any reason, including medications I receive from another health care provider, and will notify my provider about all issues, problems or concerns, including any side effects, which may be related to my medications.  
 
I understand that this information allows my provider to adjust my treatment plan to help manage my medical condition. I understand that this information will become part of my permanent medical record. 2. I will notify my provider if I have a history of alcohol/drug misuse/addiction or if I have had treatment for alcohol/drug addiction in the past, or if I have a new problem with or concern about alcohol/drug use/addiction, because this increases the likelihood of high risk behaviors and may lead to serious medical conditions. 3. Females Only: I will notify my provider if I am or become pregnant, or if I intend to become pregnant, or if I intend to breastfeed. I understand that communication of these issues with my provider is important, due to possible effects my medication could have on an unborn fetus or breastfeeding child. Name:.David E Lanita Severin. QMI:[birth year] MR #:[id number] Provider Hue Gayle MD  
*[id number]* BSMG-491 (5/16) Page 2 of 5 Initial [employer] MISUSE OF MEDICATIONS / DRUGS: 
 
1. I agree to take all controlled substances as prescribed, and will not misuse or abuse any controlled substances prescribed by my provider. For my safety, I will not increase the amount of medicine I take without first talking with and getting permission from my provider. 2. If I have a medical emergency, another health care provider may prescribe me medication. If I seek emergency treatment, I will notify my provider within seventy-two (72) hours. 3. I understand that my provider may discuss my use and/or possible misuse/abuse of controlled substances and alcohol, as appropriate, with any health care provider involved in my care, pharmacist or legal authority. ILLEGAL DRUGS: 
 
1. I will not use illegal drugs of any kind, including but not limited to marijuana, heroin, cocaine, or any prescription drug which is not prescribed to me.  
 
DRUG DIVERSION / PRESCRIPTION FRAUD: 
 
 1. I will not share, sell, trade, give away, or otherwise misuse my prescriptions or medications. 2. I will not alter any prescriptions provided to me by my provider. SINGLE PROVIDER: 
 
1. I agree that all controlled substances that I take will be prescribed only by my provider (or his/her covering provider) under this Agreement. This agreement does not prevent me from seeking emergency medical treatment or receiving pain management related to a surgery. PROTECTING MEDICATIONS: 
 
1. I am responsible for keeping my prescriptions and medications in a safe and secure place including safeguarding them from loss or theft. I understand that lost, stolen or damaged/destroyed prescriptions or medications will not be replaced. Name:.Sergio Dye. EPZ:[birth year] MR #:[id number] Provider El Guerra MD  
*CFEE-003* BSMG-491 (5/16) Page 3 of 5 Initial [employer] PRESCRIPTION RENEWALS/REFILLS: 
 
 
1. I authorize my provider and my pharmacy to cooperate fully with any local, state, or federal law enforcement agency in the investigation of any possible misuse, sale, or other diversion of my controlled substance prescriptions or medications. RISKS: 
 
 
1. I understand that if I do not adhere to this Agreement in any way, my provider may change my prescriptions, stop prescribing controlled substances or end our provider-patient relationship. 2. If my provider decides to stop prescribing medication, or decides to end our provider-patient relationship,my provider may require that I taper my medications slowly. If necessary, my provider may also provide a prescription for other medications to treat my withdrawal symptoms. UNDERSTANDING THIS AGREEMENT: 
 
I understand that my provider may adjust or stop my prescriptions for controlled substances based on my medical condition and my treatment plan. I understand that this Agreement does not guarantee that I will be prescribed medications or controlled substances. I understand that controlled substances may be just one part of my treatment plan. My initial on each page and my signature below shows that I have read each page of this Agreement, I have had an opportunity to ask questions, and all of my questions have been answered to my satisfaction by my provider. By signing below, I agree to comply with this Agreement, and I understand that if I do not follow the Agreements listed above, my provider may stop 
 
 
 
_________________________________________  Date/Time 8/20/2019 10:03 AM   
             (Patient Signature)

## 2019-08-20 NOTE — PROGRESS NOTES
This note will not be viewable in [address] Subjective:     Julián Melendrez is a 61 y.o. male presenting for annual comprehensive personal healthcare examination. Mr. 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 He presents to the office today for complete checkup. Patient's medical history is pertinent for Crohn's disease which is followed by Dr. Andi Rooney. Patient does have periodic flareups of it but is on no suppressive medication at the present time. He does take Lomotil for his flares. Patient's disease has been complicated by the development of a reactive arthritis that he notes pain in his hands and his feet which come and go. He is on no medication for this at the present time and is not being followed by rheumatology. The patient has a history of fibromyalgia. He does have some chronic aches and pains but for the most part does not take any medication for this. Blood pressure is currently managed on lisinopril. He tolerates this without cough, dizziness or lower extremity edema. Denies headache, numbness, tingling or focal neurological problems. His GERD is managed on Dexilant. He denies breakthrough heartburn and has had no dysphasia, early satiety or unexplained weight loss. He does have chronic depression and remains on Cymbalta for this. Trazodone is used to help with sleep and his fibromyalgia but he also takes Ambien CR as well. The patient finds that the Ambien has developed some tolerance as he does not sleep as well as he has had in the past.  He denies excessive sedation, hangover effect or memory problems on the Ambien. History of present illness: This patient has multiple medical problems.   These include:  Patient Active Problem List   Diagnosis Code    Reactive arthritis ([facility] M02.30    Colon polyps K63.5    [facility] K50.90    Diverticulosis K57.90    Fibromyalgia M79.7    IBS (irritable bowel syndrome) K58.9    Onychomycosis B35.1    Sarcoidosis D86.9    Tobacco dependence F17.200    Gastroesophageal reflux disease without esophagitis K21.9    Chronic insomnia F51.04    Essential hypertension I10    Depression, major, recurrent, mild (HCC) F33.0        Past Medical History:   Diagnosis Date    Chronic insomnia 8/16/2018    Colon polyps 8/14/2018    Crohn's disease ([facility] [address] [address] 8/14/2018    Depression 8/14/2018    Diverticulosis 8/14/2018    Fibromyalgia 8/14/2018    Gastroesophageal reflux disease without esophagitis 8/16/2018    IBS (irritable bowel syndrome) 8/14/2018    Onychomycosis 8/14/2018    Reactive arthritis ([facility] 8/14/2018    Sarcoidosis 8/14/2018     Past Surgical History:   Procedure Laterality Date    HX ORCHIECTOMY Left     HX ORTHOPAEDIC      L3-5 lumbar surgery    HX TONSILLECTOMY       Allergies   Allergen Reactions    Remicade [Infliximab] Other (comments)     Current Outpatient Medications   Medication Sig Dispense Refill    lisinopril (PRINIVIL, ZESTRIL) 20 mg tablet Take 1 Tab by mouth daily. 90 Tab 0    zolpidem CR (AMBIEN CR) 12.5 mg tablet TAKE 1 TABLET BY MOUTH AT BEDTIME  3    Dexlansoprazole (DEXILANT) 60 mg CpDB Take  by mouth.  diphenoxylate-atropine (LOMOTIL) 2.5-0.025 mg per tablet Take  by mouth four (4) times daily as needed for Diarrhea.  traZODone (DESYREL) 50 mg tablet Take  by mouth nightly.       DULoxetine (CYMBALTA) 60 mg capsule TAKE 1 CAPSULE BY MOUTH EVERY DAY 90 Cap 3     Social History     Socioeconomic History    Marital status:      Spouse name: Not on file    Number of children: 2    Years of education: Not on file    Highest education level: Not on file   Occupational History    Occupation: [employer]   Tobacco Use    Smoking status: Current Every Day Smoker     Packs/day: 1.50    Smokeless tobacco: Never Used   Substance and Sexual Activity    Alcohol use: No    Drug use: Never     Family History   Problem Relation Age of Onset    Hypertension Mother     Hypertension Father     Prostate Cancer Father     Hypertension Brother     Hypertension Paternal Grandfather     Colon Cancer Paternal Grandfather     Colon Cancer Paternal Uncle        Health Maintenance   Topic Date Due    Pneumococcal 0-64 years (1 of 1 - PPSV23) 06/19/1966    Shingrix Vaccine Age 50> (1 of 2) 06/19/2010    Influenza Age 5 to Adult  08/01/2019    COLONOSCOPY  07/20/2020    DTaP/Tdap/Td series (2 - Td) 08/16/2028    Hepatitis C Screening  Completed       Review of Systems  Constitutional:  He denies fevers, weight loss, sweats, or fatigue. HEENT:  No blurred or double vision, headaches or dizziness. No difficulty with swallowing, taste, speech or smell. Respiratory:  No cough, wheezing or shortness of breath, or sputum production. Cardiac:  Denies chest pain, palpitations, unexplained indigestion, syncope, edema, PND or orthopnea. GI:  No changes in bowel habits, abdominal pain, no bloating, anorexia, nausea, vomiting or heartburn. :  He denies frequency, nocturia, stranguria, dysuria or sexual dysfunction. Extremities: Positive for chronic joint pain especially hands and feet  Skin:  No recent rash or mole changes. Neurological:  No numbness, tingling, burning paresthesias or loss of motor strength. No syncope, dizziness, frequent headaches or memory loss. ROS otherwise negative       Objective:     Vitals:    08/20/19 0957 08/20/19 1015   BP: 138/86 128/84   Pulse: 74    Resp: 20    Temp: 98 °F (36.7 °C)    TempSrc: Oral    SpO2: 96%    Weight: 202 lb 6.4 oz (91.8 kg)    Height: 6' (1.829 m)    PainSc:   0 - No pain       Body mass index is 27.45 kg/m². Physical exam:   General Appearance:  Well-developed, well-nourished, no acute distress. Vision:  Deferred to ophthalmologist.    Hearing: HEENT:    Ears:  The TMs and ear canals were clear.   Eyes:  The pupillary responses were normal.  Extraocular muscle function intact. Lids and conjunctiva not injected. Funduscopic exam revealed sharp disc margins. Pharynx:  Clear with teeth in good repair. No masses were noted. Neck:  Supple without thyromegaly or adenopathy. No JVD noted. No carotid bruits. Lungs: Clear to auscultation and percussion. Cardiac:  Regular rate and rhythm. No murmur. PMI not displaced. No gallop, rub or click. Abdomen:  Flat, soft, non-tender without palpable organomegaly or mass. No pulsatile mass was felt, and no bruit was heard. Bowel sounds were active. Extremities:  No clubbing, cyanosis or edema. Pulses:  Dorsalis pedis and posterior tibial pulses felt without difficulty. Skin:  No unusual rash or mole changes noted. Lymph Nodes:  None felt in the cervical, supraclavicular, axillary or inguinal region. Neurological:  Cranial nerves II-XII grossly intact. Motor strength 5/5. DTRs 2+ and symmetric. Station and gait normal.         Assessment/Plan:   Impressions:  Diagnoses and all orders for this visit:    Routine physical examination  -     CBC WITH AUTOMATED DIFF  -     COLLECTION VENOUS BLOOD,VENIPUNCTURE  -     LIPID PANEL  -     METABOLIC PANEL, COMPREHENSIVE  -     URIC ACID  -     TSH 3RD GENERATION  -     PSA, DIAGNOSTIC (PROSTATE SPECIFIC AG)  -     URINALYSIS W/MICROSCOPIC    Essential hypertension    Gastroesophageal reflux disease without esophagitis    Crohn's disease with complication, unspecified gastrointestinal tract location ([address]    Reactive arthritis (HCC)    Chronic insomnia    Depression, major, recurrent, mild ([address]        Other instructions: The patient's medications were reviewed and reconciled. No change in his current medical regimen is made. Body mass index is 27.5 and dietary counseling along with printed patient education is given    Health maintenance issues were reviewed and I have recommended a pneumonia shot and influenza vaccination this fall which he will be amenable to.   In addition shingles vaccine has been recommended sometime after the above to immunizations are given. Await results of multiple labs    A controlled substance contract was completed today    Follow-up in 6 months    Follow-up and Dispositions    · Return in about 6 months (around 2/20/2020).          Derrick Philippe MD

## 2019-08-21 LAB
BASOPHILS # BLD AUTO: 0 X10E3/UL (ref 0–0.2)
BASOPHILS NFR BLD AUTO: 1 %
EOSINOPHIL # BLD AUTO: 0.2 X10E3/UL (ref 0–0.4)
EOSINOPHIL NFR BLD AUTO: 3 %
ERYTHROCYTE [DISTWIDTH] IN BLOOD BY AUTOMATED COUNT: 12.9 % (ref 12.3–15.4)
HCT VFR BLD AUTO: 44.4 % (ref 37.5–51)
HGB BLD-MCNC: 14.9 G/DL (ref 13–17.7)
IMM GRANULOCYTES # BLD AUTO: 0 X10E3/UL (ref 0–0.1)
IMM GRANULOCYTES NFR BLD AUTO: 0 %
LYMPHOCYTES # BLD AUTO: 1.5 X10E3/UL (ref 0.7–3.1)
LYMPHOCYTES NFR BLD AUTO: 26 %
MCH RBC QN AUTO: 30.5 PG (ref 26.6–33)
MCHC RBC AUTO-ENTMCNC: 33.6 G/DL (ref 31.5–35.7)
MCV RBC AUTO: 91 FL (ref 79–97)
MONOCYTES # BLD AUTO: 0.6 X10E3/UL (ref 0.1–0.9)
MONOCYTES NFR BLD AUTO: 10 %
NEUTROPHILS # BLD AUTO: 3.4 X10E3/UL (ref 1.4–7)
NEUTROPHILS NFR BLD AUTO: 60 %
PLATELET # BLD AUTO: 268 X10E3/UL (ref 150–450)
RBC # BLD AUTO: 4.89 X10E6/UL (ref 4.14–5.8)
WBC # BLD AUTO: 5.7 X10E3/UL (ref 3.4–10.8)

## 2019-08-22 LAB
PSA, TEST22: 0.8 NG/ML (ref 0–4)
TSH SERPL DL<=0.05 MIU/L-ACNC: 0.83 UIU/ML (ref 0.34–5.6)

## 2019-09-04 DIAGNOSIS — M79.7 FIBROMYALGIA: ICD-10-CM

## 2019-09-04 RX ORDER — DULOXETIN HYDROCHLORIDE 60 MG/1
CAPSULE, DELAYED RELEASE ORAL
Qty: 90 CAP | Refills: 3 | Status: SHIPPED | OUTPATIENT
Start: 2019-09-04 | End: 2020-08-28

## 2019-09-21 ENCOUNTER — APPOINTMENT (OUTPATIENT)
Dept: CT IMAGING | Age: 59
End: 2019-09-21
Attending: EMERGENCY MEDICINE
Payer: COMMERCIAL

## 2019-09-21 ENCOUNTER — HOSPITAL ENCOUNTER (OUTPATIENT)
Age: 59
Setting detail: OBSERVATION
Discharge: HOME | End: 2019-09-23
Attending: EMERGENCY MEDICINE | Admitting: INTERNAL MEDICINE
Payer: COMMERCIAL

## 2019-09-21 ENCOUNTER — APPOINTMENT (OUTPATIENT)
Dept: GENERAL RADIOLOGY | Age: 59
End: 2019-09-21
Attending: INTERNAL MEDICINE
Payer: COMMERCIAL

## 2019-09-21 DIAGNOSIS — I65.23 BILATERAL CAROTID ARTERY STENOSIS: ICD-10-CM

## 2019-09-21 DIAGNOSIS — R29.90 STROKE-LIKE SYMPTOMS: Primary | ICD-10-CM

## 2019-09-21 DIAGNOSIS — F51.04 CHRONIC INSOMNIA: Chronic | ICD-10-CM

## 2019-09-21 DIAGNOSIS — G45.9 TIA (TRANSIENT ISCHEMIC ATTACK): ICD-10-CM

## 2019-09-21 DIAGNOSIS — G45.1 TRANSIENT ISCHEMIC ATTACK INVOLVING RIGHT INTERNAL CAROTID ARTERY: ICD-10-CM

## 2019-09-21 LAB
ALBUMIN SERPL-MCNC: 3.6 G/DL (ref 3.5–5)
ALBUMIN/GLOB SERPL: 1 {RATIO} (ref 1.1–2.2)
ALP SERPL-CCNC: 108 U/L (ref 45–117)
ALT SERPL-CCNC: 33 U/L (ref 12–78)
ANION GAP SERPL CALC-SCNC: 3 MMOL/L (ref 5–15)
APTT PPP: 24.7 SEC (ref 22.1–32)
AST SERPL-CCNC: 21 U/L (ref 15–37)
BASOPHILS # BLD: 0 K/UL (ref 0–0.1)
BASOPHILS NFR BLD: 0 % (ref 0–1)
BILIRUB SERPL-MCNC: 0.3 MG/DL (ref 0.2–1)
BUN SERPL-MCNC: 25 MG/DL (ref 6–20)
BUN/CREAT SERPL: 21 (ref 12–20)
CALCIUM SERPL-MCNC: 8.6 MG/DL (ref 8.5–10.1)
CHLORIDE SERPL-SCNC: 107 MMOL/L (ref 97–108)
CO2 SERPL-SCNC: 30 MMOL/L (ref 21–32)
CREAT SERPL-MCNC: 1.19 MG/DL (ref 0.7–1.3)
DIFFERENTIAL METHOD BLD: ABNORMAL
EOSINOPHIL # BLD: 0.1 K/UL (ref 0–0.4)
EOSINOPHIL NFR BLD: 0 % (ref 0–7)
ERYTHROCYTE [DISTWIDTH] IN BLOOD BY AUTOMATED COUNT: 13.1 % (ref 11.5–14.5)
GLOBULIN SER CALC-MCNC: 3.7 G/DL (ref 2–4)
GLUCOSE BLD STRIP.AUTO-MCNC: 122 MG/DL (ref 65–100)
GLUCOSE SERPL-MCNC: 96 MG/DL (ref 65–100)
HCT VFR BLD AUTO: 45.4 % (ref 36.6–50.3)
HGB BLD-MCNC: 15.1 G/DL (ref 12.1–17)
IMM GRANULOCYTES # BLD AUTO: 0.1 K/UL (ref 0–0.04)
IMM GRANULOCYTES NFR BLD AUTO: 0 % (ref 0–0.5)
INR PPP: 1 (ref 0.9–1.1)
LYMPHOCYTES # BLD: 1.1 K/UL (ref 0.8–3.5)
LYMPHOCYTES NFR BLD: 10 % (ref 12–49)
MCH RBC QN AUTO: 30.7 PG (ref 26–34)
MCHC RBC AUTO-ENTMCNC: 33.3 G/DL (ref 30–36.5)
MCV RBC AUTO: 92.3 FL (ref 80–99)
MONOCYTES # BLD: 0.6 K/UL (ref 0–1)
MONOCYTES NFR BLD: 5 % (ref 5–13)
NEUTS SEG # BLD: 9.8 K/UL (ref 1.8–8)
NEUTS SEG NFR BLD: 85 % (ref 32–75)
NRBC # BLD: 0 K/UL (ref 0–0.01)
NRBC BLD-RTO: 0 PER 100 WBC
PLATELET # BLD AUTO: 315 K/UL (ref 150–400)
PMV BLD AUTO: 10.3 FL (ref 8.9–12.9)
POTASSIUM SERPL-SCNC: 4 MMOL/L (ref 3.5–5.1)
PROT SERPL-MCNC: 7.3 G/DL (ref 6.4–8.2)
PROTHROMBIN TIME: 10.4 SEC (ref 9–11.1)
RBC # BLD AUTO: 4.92 M/UL (ref 4.1–5.7)
SERVICE CMNT-IMP: ABNORMAL
SODIUM SERPL-SCNC: 140 MMOL/L (ref 136–145)
THERAPEUTIC RANGE,PTTT: NORMAL SECS (ref 58–77)
TROPONIN I SERPL-MCNC: <0.05 NG/ML
WBC # BLD AUTO: 11.6 K/UL (ref 4.1–11.1)

## 2019-09-21 PROCEDURE — 99218 HC RM OBSERVATION: CPT

## 2019-09-21 PROCEDURE — 74011250637 HC RX REV CODE- 250/637: Performed by: FAMILY MEDICINE

## 2019-09-21 PROCEDURE — 84484 ASSAY OF TROPONIN QUANT: CPT

## 2019-09-21 PROCEDURE — 36415 COLL VENOUS BLD VENIPUNCTURE: CPT

## 2019-09-21 PROCEDURE — 70450 CT HEAD/BRAIN W/O DYE: CPT

## 2019-09-21 PROCEDURE — 82962 GLUCOSE BLOOD TEST: CPT

## 2019-09-21 PROCEDURE — 85730 THROMBOPLASTIN TIME PARTIAL: CPT

## 2019-09-21 PROCEDURE — 85610 PROTHROMBIN TIME: CPT

## 2019-09-21 PROCEDURE — 74011636320 HC RX REV CODE- 636/320: Performed by: EMERGENCY MEDICINE

## 2019-09-21 PROCEDURE — 74011250637 HC RX REV CODE- 250/637: Performed by: INTERNAL MEDICINE

## 2019-09-21 PROCEDURE — 93005 ELECTROCARDIOGRAM TRACING: CPT

## 2019-09-21 PROCEDURE — 99285 EMERGENCY DEPT VISIT HI MDM: CPT

## 2019-09-21 PROCEDURE — 71045 X-RAY EXAM CHEST 1 VIEW: CPT

## 2019-09-21 PROCEDURE — 70496 CT ANGIOGRAPHY HEAD: CPT

## 2019-09-21 PROCEDURE — 80053 COMPREHEN METABOLIC PANEL: CPT

## 2019-09-21 PROCEDURE — 85025 COMPLETE CBC W/AUTO DIFF WBC: CPT

## 2019-09-21 RX ORDER — IBUPROFEN 200 MG
1 TABLET ORAL DAILY
Status: DISCONTINUED | OUTPATIENT
Start: 2019-09-21 | End: 2019-09-23 | Stop reason: HOSPADM

## 2019-09-21 RX ORDER — SODIUM CHLORIDE 0.9 % (FLUSH) 0.9 %
5-40 SYRINGE (ML) INJECTION AS NEEDED
Status: DISCONTINUED | OUTPATIENT
Start: 2019-09-21 | End: 2019-09-23 | Stop reason: HOSPADM

## 2019-09-21 RX ORDER — PREDNISONE 20 MG/1
40 TABLET ORAL
COMMUNITY
Start: 2019-09-21 | End: 2019-09-23

## 2019-09-21 RX ORDER — PANTOPRAZOLE SODIUM 40 MG/1
40 TABLET, DELAYED RELEASE ORAL DAILY
Status: DISCONTINUED | OUTPATIENT
Start: 2019-09-22 | End: 2019-09-23 | Stop reason: HOSPADM

## 2019-09-21 RX ORDER — GUAIFENESIN 100 MG/5ML
81 LIQUID (ML) ORAL DAILY
Status: DISCONTINUED | OUTPATIENT
Start: 2019-09-22 | End: 2019-09-21

## 2019-09-21 RX ORDER — PRAVASTATIN SODIUM 40 MG/1
40 TABLET ORAL
Status: DISCONTINUED | OUTPATIENT
Start: 2019-09-21 | End: 2019-09-23 | Stop reason: HOSPADM

## 2019-09-21 RX ORDER — IBUPROFEN 200 MG
1 TABLET ORAL DAILY
Status: DISCONTINUED | OUTPATIENT
Start: 2019-09-22 | End: 2019-09-21

## 2019-09-21 RX ORDER — TRAZODONE HYDROCHLORIDE 50 MG/1
50 TABLET ORAL
Status: DISCONTINUED | OUTPATIENT
Start: 2019-09-21 | End: 2019-09-23 | Stop reason: HOSPADM

## 2019-09-21 RX ORDER — ACETAMINOPHEN 325 MG/1
650 TABLET ORAL
Status: DISCONTINUED | OUTPATIENT
Start: 2019-09-21 | End: 2019-09-23 | Stop reason: HOSPADM

## 2019-09-21 RX ORDER — DULOXETIN HYDROCHLORIDE 30 MG/1
60 CAPSULE, DELAYED RELEASE ORAL DAILY
Status: DISCONTINUED | OUTPATIENT
Start: 2019-09-22 | End: 2019-09-23 | Stop reason: HOSPADM

## 2019-09-21 RX ORDER — ZOLPIDEM TARTRATE 5 MG/1
10 TABLET ORAL
Status: DISCONTINUED | OUTPATIENT
Start: 2019-09-21 | End: 2019-09-23 | Stop reason: HOSPADM

## 2019-09-21 RX ORDER — AMOXICILLIN 250 MG/1
500 CAPSULE ORAL 2 TIMES DAILY
Status: DISCONTINUED | OUTPATIENT
Start: 2019-09-21 | End: 2019-09-23 | Stop reason: HOSPADM

## 2019-09-21 RX ORDER — LISINOPRIL 20 MG/1
20 TABLET ORAL DAILY
Status: DISCONTINUED | OUTPATIENT
Start: 2019-09-22 | End: 2019-09-23 | Stop reason: HOSPADM

## 2019-09-21 RX ORDER — ACETAMINOPHEN 650 MG/1
650 SUPPOSITORY RECTAL
Status: DISCONTINUED | OUTPATIENT
Start: 2019-09-21 | End: 2019-09-23 | Stop reason: HOSPADM

## 2019-09-21 RX ORDER — AMOXICILLIN 500 MG/1
500 TABLET, FILM COATED ORAL 2 TIMES DAILY
COMMUNITY
Start: 2019-09-21 | End: 2019-09-23

## 2019-09-21 RX ORDER — GUAIFENESIN 100 MG/5ML
81 LIQUID (ML) ORAL DAILY
Status: DISCONTINUED | OUTPATIENT
Start: 2019-09-21 | End: 2019-09-23 | Stop reason: HOSPADM

## 2019-09-21 RX ORDER — SODIUM CHLORIDE 0.9 % (FLUSH) 0.9 %
5-40 SYRINGE (ML) INJECTION EVERY 8 HOURS
Status: DISCONTINUED | OUTPATIENT
Start: 2019-09-21 | End: 2019-09-23 | Stop reason: HOSPADM

## 2019-09-21 RX ORDER — ENOXAPARIN SODIUM 100 MG/ML
40 INJECTION SUBCUTANEOUS EVERY 24 HOURS
Status: DISCONTINUED | OUTPATIENT
Start: 2019-09-21 | End: 2019-09-23 | Stop reason: HOSPADM

## 2019-09-21 RX ORDER — DIPHENOXYLATE HYDROCHLORIDE AND ATROPINE SULFATE 2.5; .025 MG/1; MG/1
1 TABLET ORAL
Status: DISCONTINUED | OUTPATIENT
Start: 2019-09-21 | End: 2019-09-23 | Stop reason: HOSPADM

## 2019-09-21 RX ORDER — SODIUM CHLORIDE 0.9 % (FLUSH) 0.9 %
10 SYRINGE (ML) INJECTION
Status: COMPLETED | OUTPATIENT
Start: 2019-09-21 | End: 2019-09-21

## 2019-09-21 RX ORDER — LABETALOL HYDROCHLORIDE 5 MG/ML
20 INJECTION, SOLUTION INTRAVENOUS
Status: DISCONTINUED | OUTPATIENT
Start: 2019-09-21 | End: 2019-09-23 | Stop reason: HOSPADM

## 2019-09-21 RX ORDER — PREDNISONE 20 MG/1
40 TABLET ORAL
Status: DISCONTINUED | OUTPATIENT
Start: 2019-09-22 | End: 2019-09-23 | Stop reason: HOSPADM

## 2019-09-21 RX ADMIN — Medication 10 ML: at 16:00

## 2019-09-21 RX ADMIN — AMOXICILLIN 500 MG: 250 CAPSULE ORAL at 21:17

## 2019-09-21 RX ADMIN — ZOLPIDEM TARTRATE 10 MG: 5 TABLET ORAL at 21:17

## 2019-09-21 RX ADMIN — TRAZODONE HYDROCHLORIDE 50 MG: 50 TABLET ORAL at 21:18

## 2019-09-21 RX ADMIN — PRAVASTATIN SODIUM 40 MG: 40 TABLET ORAL at 21:18

## 2019-09-21 RX ADMIN — Medication 10 ML: at 21:18

## 2019-09-21 RX ADMIN — IOPAMIDOL 100 ML: 755 INJECTION, SOLUTION INTRAVENOUS at 16:01

## 2019-09-21 NOTE — ED NOTES
Pt. Presents to ED today for complaints of multiple episodes of L sided numbness today that lasted for very short periods of time. Upon arrival patient symptoms have resolved. Pt. Placed in position of comfort with call bell in reach.

## 2019-09-21 NOTE — ED NOTES
TRANSFER - OUT REPORT:    Verbal report given to Melinda (name) on Eulalio Chaidez.  being transferred to Neuro (unit) for routine progression of care       Report consisted of patients Situation, Background, Assessment and   Recommendations(SBAR). Information from the following report(s) SBAR, Kardex, ED Summary, [place] and Recent Results was reviewed with the receiving nurse. Lines:   Peripheral IV 09/21/19 Right Antecubital (Active)   Site Assessment Clean, dry, & intact 9/21/2019  4:05 PM   Phlebitis Assessment 0 9/21/2019  4:05 PM   Infiltration Assessment 0 9/21/2019  4:05 PM   Dressing Status Clean, dry, & intact 9/21/2019  4:05 PM   Dressing Type Transparent 9/21/2019  4:05 PM   Hub Color/Line Status Patent 9/21/2019  4:05 PM        Opportunity for questions and clarification was provided.

## 2019-09-21 NOTE — PROGRESS NOTES

## 2019-09-21 NOTE — H&P
Hospitalist Admission Note    NAME: Eulalio Chaidez. :  1960   MRN:  [id number]     Date/Time:  2019 6:26 PM    Patient PCP: Luis Lopez MD  ______________________________________________________________________  Given the patient's current clinical presentation, I have a high level of concern for decompensation if discharged from the emergency department. Complex decision making was performed, which includes reviewing the patient's available past medical records, laboratory results, and x-ray films. My assessment of this patient's clinical condition and my plan of care is as follows. Assessment / Plan:    TIA  Hypertension  Admit to neuro floor, will get MRI of the brain without contrast, 2D echo , no need for Doppler of the carotids a CTA  of the neck and head is already done . We will get HbA1c, lipid profile  Continue with lisinopril for hypertension  Continue aspirin and statin    Leucocytosis , most likely steroid induced   Pt on steroid for  Recent sinus infection  Check UA and chest xray     Fibromyalgia  Depression/insomnia  Continue with Cymbalta, trazodone and Ambien    Recent sinus infection  Amoxicillin and prednisone for 2 more days. Tobacco abuse  Nicotine patch ordered      Code Status: wife   Surrogate Decision Maker: wife     DVT Prophylaxis: lovenox  GI Prophylaxis: not indicated    Baseline: ambulatory      Subjective:   CHIEF COMPLAINT: numbness left side     HISTORY OF PRESENT ILLNESS:     Dimas Fletcher is a 61 y.o.  male  with past medical history of recently diagnosed hypertension, Crohn's disease, depression and fibromyalgia, recent sinus infection treated with antibiotics who presented to the ED for numbness of the left side  Of  his body which started today around 12 PM.  Patient reported intermittent numbness throughout his left side . He also reported dizziness but denies any focal deficit , HA or seizure like activity . symptoms resolved after 4 hours.  In the ED he was evaluated  by a tele neurologist, and he was found not to be a TPA candidate because of his stroke scale being 0.  He is vital signs were stable, his labs showed slightly elevated white blood cell count. His CT scan of the brain and CTA were negative. We were asked to admit for work up and evaluation of the above problems. Past Medical History:   Diagnosis Date    Chronic insomnia 8/16/2018    Colon polyps 8/14/2018    Crohn's disease ([facility] 8/14/2018    Depression 8/14/2018    Diverticulosis 8/14/2018    Fibromyalgia 8/14/2018    Gastroesophageal reflux disease without esophagitis 8/16/2018    IBS (irritable bowel syndrome) 8/14/2018    Onychomycosis 8/14/2018    [facility] 8/14/2018    Sarcoidosis 8/14/2018        Past Surgical History:   Procedure Laterality Date    HX ORCHIECTOMY Left     HX ORTHOPAEDIC      L3-5 lumbar surgery    HX TONSILLECTOMY         Social History     Tobacco Use    Smoking status: Current Every Day Smoker     Packs/day: 1.50    Smokeless tobacco: Never Used   Substance Use Topics    Alcohol use: No        Family History   Problem Relation Age of Onset    Hypertension Mother     Hypertension Father     Prostate Cancer Father     Hypertension Brother     Hypertension Paternal Grandfather     Colon Cancer Paternal Grandfather     Colon Cancer Paternal Uncle      Allergies   Allergen Reactions    Remicade [Infliximab] Other (comments)        Prior to Admission medications    Medication Sig Start Date End Date Taking? Authorizing Provider   amoxicillin 500 mg tab Take 500 mg by mouth two (2) times a day. 9/21/19 9/22/19 Yes Provider, Historical   predniSONE (DELTASONE) 20 mg tablet Take 40 mg by mouth daily (with breakfast).  9/21/19 9/22/19 Yes Provider, Historical   DULoxetine (CYMBALTA) 60 mg capsule TAKE 1 CAPSULE BY MOUTH EVERY DAY 9/4/19   Brittanie Oakes MD   lisinopril ([facility], ZESTRIL) 20 mg tablet Take 1 Tab by mouth daily. 7/25/19   Juwan Ceron MD   zolpidem CR (AMBIEN CR) 12.5 mg tablet TAKE 1 TABLET BY MOUTH AT BEDTIME 7/24/18   Provider, Historical   Dexlansoprazole (DEXILANT) 60 mg CpDB Take  by mouth. Provider, Historical   diphenoxylate-atropine (LOMOTIL) 2.5-0.025 mg per tablet Take  by mouth four (4) times daily as needed for Diarrhea. Provider, Historical   traZODone (DESYREL) 50 mg tablet Take  by mouth nightly. Provider, Historical       REVIEW OF SYSTEMS:     I am not able to complete the review of systems because:    The patient is intubated and sedated    The patient has altered mental status due to his acute medical problems    The patient has baseline aphasia from prior stroke(s)    The patient has baseline dementia and is not reliable historian    The patient is in acute medical distress and unable to provide information           Total of 12 systems reviewed as follows:       POSITIVE= underlined text  Negative = text not underlined  General:  fever, chills, sweats, generalized weakness, weight loss/gain,      loss of appetite   Eyes:    blurred vision, eye pain, loss of vision, double vision  ENT:    rhinorrhea, pharyngitis   Respiratory:   cough, sputum production, SOB, JERRY, wheezing, pleuritic pain   Cardiology:   chest pain, palpitations, orthopnea, PND, edema, syncope   Gastrointestinal:  abdominal pain , N/V, diarrhea, dysphagia, constipation, bleeding   Genitourinary:  frequency, urgency, dysuria, hematuria, incontinence   Muskuloskeletal :  arthralgia, myalgia, back pain  Hematology:  easy bruising, nose or gum bleeding, lymphadenopathy   Dermatological: rash, ulceration, pruritis, color change / jaundice  Endocrine:   hot flashes or polydipsia   Neurological:  headache, dizziness, confusion, focal weakness, paresthesia,     Speech difficulties, memory loss, gait difficulty  Psychological: Feelings of anxiety, depression, agitation    Objective:   VITALS:    Visit Vitals  /81   Pulse 87   Temp 98.4 °F (36.9 °C)   Resp 17   Ht 6' (1.829 m)   Wt 97.7 kg (215 lb 6.2 oz)   SpO2 97%   BMI 29.21 kg/m²       PHYSICAL EXAM:    General:    Alert, cooperative, no distress, appears stated age. HEENT: Atraumatic, anicteric sclerae, pink conjunctivae     No oral ulcers, mucosa moist, throat clear, dentition fair  Neck:  Supple, symmetrical,  thyroid: non tender  Lungs:   Clear to auscultation bilaterally. No Wheezing or Rhonchi. No rales. Chest wall:  No tenderness  No Accessory muscle use. Heart:   Regular  rhythm,  No  murmur   No edema  Abdomen:   Soft, non-tender. Not distended. Bowel sounds normal  Extremities: No cyanosis. No clubbing,      Skin turgor normal, Capillary refill normal, Radial dial pulse 2+  Skin:     Not pale. Not Jaundiced  No rashes   Psych:  Good insight. Not depressed. Not anxious or agitated. Neurologic: EOMs intact. No facial asymmetry. No aphasia or slurred speech. Symmetrical strength, Sensation grossly intact.  Alert and oriented X 4.     _______________________________________________________________________  Care Plan discussed with:    Comments   Patient x    Family  x Wife at bedside    RN x    Care Manager                    Consultant:      _______________________________________________________________________  Expected  Disposition:   Home with Family x   HH/PT/OT/RN    SNF/LTC    JOSEPHINE    ________________________________________________________________________  TOTAL TIME:  65Minutes    Critical Care Provided     Minutes non procedure based      Comments    x Reviewed previous records   >50% of visit spent in counseling and coordination of care x Discussion with patient and/or family and questions answered       ________________________________________________________________________  Signed: Michael Hutton MD    Procedures: see electronic medical records for all procedures/Xrays and details which were not copied into this note but were reviewed prior to creation of Plan. LAB DATA REVIEWED:    Recent Results (from the past 24 hour(s))   GLUCOSE, POC    Collection Time: 09/21/19  3:18 PM   Result Value Ref Range    Glucose (POC) 122 (H) 65 - 100 mg/dL    Performed by Bright Sis    CBC WITH AUTOMATED DIFF    Collection Time: 09/21/19  3:54 PM   Result Value Ref Range    WBC 11.6 (H) 4.1 - 11.1 K/uL    RBC 4.92 4.10 - 5.70 M/uL    HGB 15.1 12.1 - 17.0 g/dL    HCT 45.4 36.6 - 50.3 %    MCV 92.3 80.0 - 99.0 FL    MCH 30.7 26.0 - 34.0 PG    MCHC 33.3 30.0 - 36.5 g/dL    RDW 13.1 11.5 - 14.5 %    PLATELET 521 601 - 327 K/uL    MPV 10.3 8.9 - 12.9 FL    NRBC 0.0 0  WBC    ABSOLUTE NRBC 0.00 0.00 - 0.01 K/uL    NEUTROPHILS 85 (H) 32 - 75 %    LYMPHOCYTES 10 (L) 12 - 49 %    MONOCYTES 5 5 - 13 %    EOSINOPHILS 0 0 - 7 %    BASOPHILS 0 0 - 1 %    IMMATURE GRANULOCYTES 0 0.0 - 0.5 %    ABS. NEUTROPHILS 9.8 (H) 1.8 - 8.0 K/UL    ABS. LYMPHOCYTES 1.1 0.8 - 3.5 K/UL    ABS. MONOCYTES 0.6 0.0 - 1.0 K/UL    ABS. EOSINOPHILS 0.1 0.0 - 0.4 K/UL    ABS. BASOPHILS 0.0 0.0 - 0.1 K/UL    ABS. IMM. GRANS. 0.1 (H) 0.00 - 0.04 K/UL    DF AUTOMATED     PROTHROMBIN TIME + INR    Collection Time: 09/21/19  3:54 PM   Result Value Ref Range    INR 1.0 0.9 - 1.1      Prothrombin time 10.4 9.0 - 98.7 sec   METABOLIC PANEL, COMPREHENSIVE    Collection Time: 09/21/19  3:54 PM   Result Value Ref Range    Sodium 140 136 - 145 mmol/L    Potassium 4.0 3.5 - 5.1 mmol/L    Chloride 107 97 - 108 mmol/L    CO2 30 21 - 32 mmol/L    Anion gap 3 (L) 5 - 15 mmol/L    Glucose 96 65 - 100 mg/dL    BUN 25 (H) 6 - 20 MG/DL    Creatinine 1.19 0.70 - 1.30 MG/DL    BUN/Creatinine ratio 21 (H) 12 - 20      GFR est AA >60 >60 ml/min/1.73m2    GFR est non-AA >60 >60 ml/min/1.73m2    Calcium 8.6 8.5 - 10.1 MG/DL    Bilirubin, total 0.3 0.2 - 1.0 MG/DL    ALT (SGPT) 33 12 - 78 U/L    AST (SGOT) 21 15 - 37 U/L    Alk.  phosphatase 108 45 - 117 U/L    Protein, total 7.3 6.4 - 8.2 g/dL    Albumin 3.6 3.5 - 5.0 g/dL    Globulin 3.7 2.0 - 4.0 g/dL    A-G Ratio 1.0 (L) 1.1 - 2.2     PTT    Collection Time: 09/21/19  3:54 PM   Result Value Ref Range    aPTT 24.7 22.1 - 32.0 sec    aPTT, therapeutic range     58.0 - 77.0 SECS   TROPONIN I    Collection Time: 09/21/19  3:54 PM   Result Value Ref Range    Troponin-I, Qt. <0.05 <0.05 ng/mL   EKG, 12 LEAD, INITIAL    Collection Time: 09/21/19  4:17 PM   Result Value Ref Range    Ventricular Rate 78 BPM    Atrial Rate 78 BPM    P-R Interval 146 ms    QRS Duration 88 ms    Q-T Interval 366 ms    QTC Calculation (Bezet) 417 ms    Calculated P Axis 57 degrees    Calculated R Axis 7 degrees    Calculated T Axis 61 degrees    Diagnosis       Sinus rhythm with occasional premature ventricular complexes  When compared with ECG of 30-JUN-2019 15:01,  premature ventricular complexes are now present

## 2019-09-21 NOTE — ED PROVIDER NOTES
EMERGENCY DEPARTMENT HISTORY AND PHYSICAL EXAM      Date: (Not on file)  Patient Name: Beny Chacko. Please note that this dictation was completed with Full[employer], the computer voice recognition software. Quite often unanticipated grammatical, syntax, homophones, and other interpretive errors are inadvertently transcribed by the computer software. Please disregard these errors. Please excuse any errors that have escaped final proofreading. History of Presenting Illness     Chief Complaint   Patient presents with    Numbness     complains of LUE numbness times three hours       History Provided By: Patient    HPI: Beny Chacko., 61 y.o. male, pmh significant for htn, gerd, crohn's, sarcoidosis presenting to the ED with left sided numbness that started at approximately noon. Symptoms started in the left arm, then would move up to the face and down the arm. Symptoms would come and go, last approximately 30 to 40 seconds at a time. He still has some mild numbness and tingling in his arms. He denies any slurring of speech, facial droop, weakness in the upper or lower extremities. He does report feeling slightly unsteady with ambulation. No falls or trauma. Not on any anticoagulation. PCP: Shasta Dobson MD    No current facility-administered medications on file prior to encounter. Current Outpatient Medications on File Prior to Encounter   Medication Sig Dispense Refill    DULoxetine (CYMBALTA) 60 mg capsule TAKE 1 CAPSULE BY MOUTH EVERY DAY 90 Cap 3    lisinopril (PRINIVIL, ZESTRIL) 20 mg tablet Take 1 Tab by mouth daily. 90 Tab 0    zolpidem CR (AMBIEN CR) 12.5 mg tablet TAKE 1 TABLET BY MOUTH AT BEDTIME  3    Dexlansoprazole (DEXILANT) 60 mg CpDB Take  by mouth.  diphenoxylate-atropine (LOMOTIL) 2.5-0.025 mg per tablet Take  by mouth four (4) times daily as needed for Diarrhea.  traZODone (DESYREL) 50 mg tablet Take  by mouth nightly.          Past History     Past Medical History:  Past Medical History:   Diagnosis Date    Chronic insomnia 8/16/2018    Colon polyps 8/14/2018    Cro[facility]'s disease ([facility] 8/14/2018    Depression 8/14/2018    Diverticulosis 8/14/2018    Fibromyalgia 8/14/2018    Gastroesophageal reflux disease without esophagitis 8/16/2018    IBS (irritable bowel syndrome) 8/14/2018    Onychomycosis 8/14/2018    Reactive arthritis ([facility] 8/14/2018    Sarcoidosis 8/14/2018       Past Surgical History:  Past Surgical History:   Procedure Laterality Date    HX ORCHIECTOMY Left     HX ORTHOPAEDIC      L3-5 lumbar surgery    HX TONSILLECTOMY         Family History:  Family History   Problem Relation Age of Onset    Hypertension Mother     Hypertension Father     Prostate Cancer Father     Hypertension Brother     Hypertension Paternal Grandfather     Colon Cancer Paternal Grandfather     Colon Cancer Paternal Uncle        Social History:  Social History     Tobacco Use    Smoking status: Current Every Day Smoker     Packs/day: 1.50    Smokeless tobacco: Never Used   Substance Use Topics    Alcohol use: No    Drug use: Never       Allergies: Allergies   Allergen Reactions    Remicade [Infliximab] Other (comments)         Review of Systems   Review of Systems   Constitutional: Negative for chills and fever. HENT: Negative for congestion and sore throat. Eyes: Negative for visual disturbance. Respiratory: Negative for cough and shortness of breath. Cardiovascular: Negative for chest pain and leg swelling. Gastrointestinal: Negative for abdominal pain, blood in stool, diarrhea and nausea. Endocrine: Negative for polyuria. Genitourinary: Negative for dysuria and testicular pain. Musculoskeletal: Negative for arthralgias, joint swelling and myalgias. Skin: Negative for rash. Allergic/Immunologic: Negative for immunocompromised state. Neurological: Positive for light-headedness and numbness. Negative for weakness and headaches. Hematological: Does not bruise/bleed easily. Psychiatric/Behavioral: Negative for confusion. Physical Exam   Physical Exam   Constitutional: He is oriented to person, place, and time. He appears well-developed and well-nourished. HENT:   Head: Normocephalic and atraumatic. Mouth/Throat: Oropharynx is clear and moist.   Eyes: Pupils are equal, round, and reactive to light. Conjunctivae and EOM are normal.   Neck: Normal range of motion. Neck supple. No tracheal deviation present. Cardiovascular: Normal rate, regular rhythm, normal heart sounds and intact distal pulses. No murmur heard. Pulmonary/Chest: Effort normal and breath sounds normal. No respiratory distress. He has no wheezes. He has no rales. Abdominal: Soft. Bowel sounds are normal. There is no tenderness. There is no rebound and no guarding. Musculoskeletal: He exhibits no edema or deformity. Neurological: He is alert and oriented to person, place, and time. GCS eye subscore is 4. GCS verbal subscore is 5. GCS motor subscore is 6. EOMI intact, no facial droop or asymmetry, normal/equal sensation in face. Uvula elevates at midline, no tongue deviation. Normal strength with head rotation and shoulder shrug. 5/5 Strength in the bilateral upper and lower extremities, no pronotor drift, normal finger to nose. No truncal ataxia. Normal speech: no dysarthria or aphasia. Skin: Skin is warm and dry. Psychiatric: He has a normal mood and affect. His speech is normal.   Nursing note and vitals reviewed.       Diagnostic Study Results     Labs -     Recent Results (from the past 12 hour(s))   GLUCOSE, POC    Collection Time: 09/21/19  3:18 PM   Result Value Ref Range    Glucose (POC) 122 (H) 65 - 100 mg/dL    Performed by Steve Vela        Radiologic Studies -   CT CODE NEURO HEAD WO CONTRAST    (Results Pending)     CT Results  (Last 48 hours)    None        CXR Results  (Last 48 hours)    None            Medical Decision Making   I am the first provider for this patient. I reviewed the vital signs, available nursing notes, past medical history, past surgical history, family history and social history. Vital Signs-Reviewed the patient's vital signs. Patient Vitals for the past 12 hrs:   Temp Pulse Resp BP SpO2   09/21/19 1518 98.4 °F (36.9 °C) 88 16 143/87 99 %           Records Reviewed: Nursing Notes and Old Medical Records    Provider Notes (Medical Decision Making):   Patient evaluated as a level 1 stroke given stuttering neurologic symptoms. Disposition pending laboratory work-up and imaging. He does not appear to be a TPA candidate due to low NIH stroke scale at this time. Patient being evaluated by telemetry neurology, will appreciate further recommendations from them. ED Course:   Initial assessment performed. The patients presenting problems have been discussed, and they are in agreement with the care plan formulated and outlined with them. I have encouraged them to ask questions as they arise throughout their visit. ED Course as of Sep 22 0119   Sat Sep 21, 2019   1721 Discussed with Dr. Merritt Hutchinson, she will see and evaluate the patient for admission.    [AR]      ED Course User Index  [AR] Ainsley Romero DO     3:33 PM  Patient speaking with tele neurologist. Dr. Iván Barnes Critical Care Time:   none    Disposition:  1. Stroke like symptoms. Diagnosis     Clinical Impression: No diagnosis found. Attestations:   This note was completed by Ton Jha DO

## 2019-09-21 NOTE — ROUTINE PROCESS
Bedside and Verbal shift change report given to Jami Eldridge (oncoming nurse) by Krys Jules (offgoing nurse). Report included the following information SBAR, Kardex, Intake/Output and MAR. Zone Phone:   [phone] Significant changes during shift:  New admit Patient Information Stefany Flaherty. 
61 y.o. 
9/21/2019  3:50 PM by Chalino Dean MD. Stefany Flaherty. was admitted from Home 
 
Problem List 
 
Patient Active Problem List  
 Diagnosis Date Noted  Essential hypertension 07/08/2019 Priority: 1 - One  Gastroesophageal reflux disease without esophagitis 08/16/2018 Priority: 1 - One  Chronic insomnia 08/16/2018 Priority: 1 - One  Reactive arthritis ([address] 08/14/2018 Priority: 1 - One  Colon polyps 08/14/2018 Priority: 1 - One  Crohn's disease ([address] 08/14/2018 Priority: 1 - One  Diverticulosis 08/14/2018 Priority: 1 - One  
 Fibromyalgia 08/14/2018 Priority: 1 - One  
 IBS (irritable bowel syndrome) 08/14/2018 Priority: 1 - One  
 Onychomycosis 08/14/2018 Priority: 1 - One  Sarcoidosis 08/14/2018 Priority: 1 - One  TIA (transient ischemic attack) 09/21/2019  Depression, major, recurrent, mild ([address] 08/20/2019  Tobacco dependence 08/14/2018 Past Medical History:  
Diagnosis Date  Chronic insomnia 8/16/2018  Colon polyps 8/14/2018  Crohn's disease ([address] 8/14/2018  Depression 8/14/2018  Diverticulosis 8/14/2018  Fibromyalgia 8/14/2018  Gastroesophageal reflux disease without esophagitis 8/16/2018  IBS (irritable bowel syndrome) 8/14/2018  Onychomycosis 8/14/2018  Reactive arthritis ([address] 8/14/2018  Sarcoidosis 8/14/2018 Core Measures: CVA: Yes Yes Activity Status: OOB to Chair Yes Ambulated this shift Yes DVT prophylaxis: DVT prophylaxis Med- Yes DVT prophylaxis SCD or KRISTY- No  
 
Wounds: (If Applicable) Wounds- No 
 
Patient Safety: 
 
Falls Score Total Score: 1 Safety Level_______ Bed Alarm On? No 
Sitter? No 
 
Plan for upcoming shift: safety, MRI, neuro checks Discharge Plan: Yes when all test/consults completed Active Consults: 
IP CONSULT TO NEUROLOGY

## 2019-09-22 ENCOUNTER — APPOINTMENT (OUTPATIENT)
Dept: MRI IMAGING | Age: 59
End: 2019-09-22
Attending: INTERNAL MEDICINE
Payer: COMMERCIAL

## 2019-09-22 ENCOUNTER — APPOINTMENT (OUTPATIENT)
Dept: NON INVASIVE DIAGNOSTICS | Age: 59
End: 2019-09-22
Attending: INTERNAL MEDICINE
Payer: COMMERCIAL

## 2019-09-22 PROBLEM — G45.1 TRANSIENT ISCHEMIC ATTACK INVOLVING RIGHT INTERNAL CAROTID ARTERY: Status: ACTIVE | Noted: 2019-09-22

## 2019-09-22 PROBLEM — I65.23 BILATERAL CAROTID ARTERY STENOSIS: Status: ACTIVE | Noted: 2019-09-22

## 2019-09-22 LAB
APPEARANCE UR: CLEAR
ATRIAL RATE: 78 BPM
BACTERIA URNS QL MICRO: NEGATIVE /HPF
BILIRUB UR QL: NEGATIVE
CALCULATED P AXIS, ECG09: 57 DEGREES
CALCULATED R AXIS, ECG10: 7 DEGREES
CALCULATED T AXIS, ECG11: 61 DEGREES
CHOLEST SERPL-MCNC: 168 MG/DL
COLOR UR: NORMAL
DIAGNOSIS, 93000: NORMAL
EPITH CASTS URNS QL MICRO: NORMAL /LPF
EST. AVERAGE GLUCOSE BLD GHB EST-MCNC: 120 MG/DL
GLUCOSE UR STRIP.AUTO-MCNC: NEGATIVE MG/DL
HBA1C MFR BLD: 5.8 % (ref 4.2–6.3)
HDLC SERPL-MCNC: 47 MG/DL
HDLC SERPL: 3.6 {RATIO} (ref 0–5)
HGB UR QL STRIP: NEGATIVE
HYALINE CASTS URNS QL MICRO: NORMAL /LPF (ref 0–5)
KETONES UR QL STRIP.AUTO: NEGATIVE MG/DL
LDLC SERPL CALC-MCNC: 102.4 MG/DL (ref 0–100)
LEUKOCYTE ESTERASE UR QL STRIP.AUTO: NEGATIVE
LIPID PROFILE,FLP: ABNORMAL
MAGNESIUM SERPL-MCNC: 2.4 MG/DL (ref 1.6–2.4)
NITRITE UR QL STRIP.AUTO: NEGATIVE
P-R INTERVAL, ECG05: 146 MS
PH UR STRIP: 6.5 [PH] (ref 5–8)
PROT UR STRIP-MCNC: NEGATIVE MG/DL
Q-T INTERVAL, ECG07: 366 MS
QRS DURATION, ECG06: 88 MS
QTC CALCULATION (BEZET), ECG08: 417 MS
RBC #/AREA URNS HPF: NORMAL /HPF (ref 0–5)
SP GR UR REFRACTOMETRY: 1.02 (ref 1–1.03)
T3FREE SERPL-MCNC: 1.9 PG/ML (ref 2.2–4)
T4 FREE SERPL-MCNC: 0.9 NG/DL (ref 0.8–1.5)
TRIGL SERPL-MCNC: 93 MG/DL (ref ?–150)
TROPONIN I SERPL-MCNC: <0.05 NG/ML
TSH SERPL DL<=0.05 MIU/L-ACNC: 0.26 UIU/ML (ref 0.36–3.74)
UA: UC IF INDICATED,UAUC: NORMAL
UROBILINOGEN UR QL STRIP.AUTO: 0.2 EU/DL (ref 0.2–1)
VENTRICULAR RATE, ECG03: 78 BPM
VLDLC SERPL CALC-MCNC: 18.6 MG/DL
WBC URNS QL MICRO: NORMAL /HPF (ref 0–4)

## 2019-09-22 PROCEDURE — 84443 ASSAY THYROID STIM HORMONE: CPT

## 2019-09-22 PROCEDURE — 81001 URINALYSIS AUTO W/SCOPE: CPT

## 2019-09-22 PROCEDURE — 80061 LIPID PANEL: CPT

## 2019-09-22 PROCEDURE — 84484 ASSAY OF TROPONIN QUANT: CPT

## 2019-09-22 PROCEDURE — 97165 OT EVAL LOW COMPLEX 30 MIN: CPT

## 2019-09-22 PROCEDURE — 84439 ASSAY OF FREE THYROXINE: CPT

## 2019-09-22 PROCEDURE — 96374 THER/PROPH/DIAG INJ IV PUSH: CPT

## 2019-09-22 PROCEDURE — 97116 GAIT TRAINING THERAPY: CPT

## 2019-09-22 PROCEDURE — 70551 MRI BRAIN STEM W/O DYE: CPT

## 2019-09-22 PROCEDURE — 83735 ASSAY OF MAGNESIUM: CPT

## 2019-09-22 PROCEDURE — 97161 PT EVAL LOW COMPLEX 20 MIN: CPT

## 2019-09-22 PROCEDURE — 99218 HC RM OBSERVATION: CPT

## 2019-09-22 PROCEDURE — 74011250636 HC RX REV CODE- 250/636: Performed by: INTERNAL MEDICINE

## 2019-09-22 PROCEDURE — 36415 COLL VENOUS BLD VENIPUNCTURE: CPT

## 2019-09-22 PROCEDURE — 84481 FREE ASSAY (FT-3): CPT

## 2019-09-22 PROCEDURE — 74011636637 HC RX REV CODE- 636/637: Performed by: INTERNAL MEDICINE

## 2019-09-22 PROCEDURE — 74011250637 HC RX REV CODE- 250/637: Performed by: INTERNAL MEDICINE

## 2019-09-22 PROCEDURE — 83036 HEMOGLOBIN GLYCOSYLATED A1C: CPT

## 2019-09-22 PROCEDURE — 93005 ELECTROCARDIOGRAM TRACING: CPT

## 2019-09-22 PROCEDURE — 90471 IMMUNIZATION ADMIN: CPT

## 2019-09-22 RX ORDER — LORAZEPAM 2 MG/ML
1 INJECTION INTRAMUSCULAR
Status: COMPLETED | OUTPATIENT
Start: 2019-09-22 | End: 2019-09-22

## 2019-09-22 RX ORDER — IBUPROFEN 200 MG
1 TABLET ORAL ONCE
Status: COMPLETED | OUTPATIENT
Start: 2019-09-22 | End: 2019-09-23

## 2019-09-22 RX ADMIN — Medication 10 ML: at 15:46

## 2019-09-22 RX ADMIN — ZOLPIDEM TARTRATE 10 MG: 5 TABLET ORAL at 21:08

## 2019-09-22 RX ADMIN — ASPIRIN 81 MG 81 MG: 81 TABLET ORAL at 09:13

## 2019-09-22 RX ADMIN — TRAZODONE HYDROCHLORIDE 50 MG: 50 TABLET ORAL at 21:08

## 2019-09-22 RX ADMIN — ENOXAPARIN SODIUM 40 MG: 40 INJECTION SUBCUTANEOUS at 19:43

## 2019-09-22 RX ADMIN — LORAZEPAM 1 MG: 2 INJECTION INTRAMUSCULAR; INTRAVENOUS at 07:35

## 2019-09-22 RX ADMIN — Medication 10 ML: at 02:12

## 2019-09-22 RX ADMIN — AMOXICILLIN 500 MG: 250 CAPSULE ORAL at 17:17

## 2019-09-22 RX ADMIN — DULOXETINE HYDROCHLORIDE 60 MG: 30 CAPSULE, DELAYED RELEASE ORAL at 09:13

## 2019-09-22 RX ADMIN — PANTOPRAZOLE SODIUM 40 MG: 40 TABLET, DELAYED RELEASE ORAL at 09:13

## 2019-09-22 RX ADMIN — PRAVASTATIN SODIUM 40 MG: 40 TABLET ORAL at 21:08

## 2019-09-22 RX ADMIN — AMOXICILLIN 500 MG: 250 CAPSULE ORAL at 09:13

## 2019-09-22 RX ADMIN — LISINOPRIL 20 MG: 20 TABLET ORAL at 09:13

## 2019-09-22 RX ADMIN — PREDNISONE 40 MG: 20 TABLET ORAL at 09:13

## 2019-09-22 NOTE — CONSULTS
Consult  REFERRED BY:  Aurora Rodgers MD    CHIEF COMPLAINT: Numbness of the left side      Subjective:     Solis Tanner is a 61 y.o. left-handed  male seen as a new patient to me, at the request of Dr. Kami Aviles for evaluation of new problem of left-sided numbness that came and went over about a 3-hour period yesterday with spells lasting 25 to 45 seconds of left-sided numbness involving his face arm and leg. Patient denies any chest pain or palpitations or any cardiac symptoms. The patient denied any weakness or coordination problems on the left side and denies any difficulty with cognition, speech, swallowing, or his gait. He did not have any precipitating cause for this. He never had a similar episode before. He is been placed on aspirin therapy and a statin, and is getting a complete neurovascular evaluation. His CTA of the head neck was normal, and CT of the head was normal.  His MRI scan just returned and that was normal.  This is most likely just a TIA, not a stroke, but patient obviously had a focal neurologic deficit, and needs complete neurovascular evaluation to rule out any treatable cause of his symptoms. Patient is a high risk for further neurologic disability or deterioration if he has recurrent focal ischemic deficits or a stroke.     Past Medical History:   Diagnosis Date    Chronic insomnia 8/16/2018    Colon polyps 8/14/2018    Crohn's disease ([address] 8/14/2018    Depression 8/14/2018    Diverticulosis 8/14/2018    Fibromyalgia 8/14/2018    Gastroesophageal reflux disease without esophagitis 8/16/2018    IBS (irritable bowel syndrome) 8/14/2018    Onychomycosis 8/14/2018    Reactive arthritis ([address] 8/14/2018    Sarcoidosis 8/14/2018      Past Surgical History:   Procedure Laterality Date    HX ORCHIECTOMY Left     HX ORTHOPAEDIC      L3-5 lumbar surgery    HX TONSILLECTOMY       Family History   Problem Relation Age of Onset    Hypertension Mother    Garcia Hypertension Father     Prostate Cancer Father     Stroke Father     Hypertension Brother     Hypertension Paternal Grandfather     Colon Cancer Paternal Grandfather     Colon Cancer Paternal Uncle     No Known Problems Child       Social History     Tobacco Use    Smoking status: Current Every Day Smoker     Packs/day: 1.50    Smokeless tobacco: Never Used   Substance Use Topics    Alcohol use: No         Current Facility-Administered Medications:     nicotine (NICODERM CQ) 14 mg/24 hr patch 1 Patch, 1 Patch, TransDERmal, ONCE, Vinh Perkins MD, 1 Patch at 09/22/19 1014    lisinopril (PRINIVIL, ZESTRIL) tablet 20 mg, 20 mg, Oral, DAILY, Chanelle Olson MD, 20 mg at 09/22/19 0913    DULoxetine (CYMBALTA) capsule 60 mg, 60 mg, Oral, DAILY, Chanelle Olson MD, 60 mg at 09/22/19 0913    traZODone (DESYREL) tablet 50 mg, 50 mg, Oral, QHS, Chanelle Olson MD, 50 mg at 09/21/19 2118    pantoprazole (PROTONIX) tablet 40 mg, 40 mg, Oral, DAILY, Chanelle Olson MD, 40 mg at 09/22/19 0913    sodium chloride (NS) flush 5-40 mL, 5-40 mL, IntraVENous, Q8H, Chanelle Olson MD, 10 mL at 09/22/19 1546    sodium chloride (NS) flush 5-40 mL, 5-40 mL, IntraVENous, PRN, Chanelle Olson MD, 10 mL at 09/22/19 0212    acetaminophen (TYLENOL) tablet 650 mg, 650 mg, Oral, Q4H PRN **OR** acetaminophen (TYLENOL) solution 650 mg, 650 mg, Per NG tube, Q4H PRN **OR** acetaminophen (TYLENOL) suppository 650 mg, 650 mg, Rectal, Q4H PRN, Chanelle Olson MD    pravastatin (PRAVACHOL) tablet 40 mg, 40 mg, Oral, QHS, Chanelle Olson MD, 40 mg at 09/21/19 2118    labetalol (NORMODYNE;TRANDATE) injection 20 mg, 20 mg, IntraVENous, Q4H PRN, Chanelle Olson MD    enoxaparin (LOVENOX) injection 40 mg, 40 mg, SubCUTAneous, Q24H, Chanelle Olson MD    zolpidem (AMBIEN) tablet 10 mg, 10 mg, Oral, QHS, Chanelle Olson MD, 10 mg at 09/21/19 2117    diphenoxylate-atropine (LOMOTIL) tablet 1 Tab, 1 Tab, Oral, QID PRN, Joao Carrera MD    predniSONE (DELTASONE) tablet 40 mg, 40 mg, Oral, DAILY WITH BREAKFAST, Joao Carrera MD, 40 mg at 09/22/19 0913    amoxicillin (AMOXIL) capsule 500 mg, 500 mg, Oral, BID, Joao Carrera MD, 500 mg at 09/22/19 0020    aspirin chewable tablet 81 mg, 81 mg, Oral, DAILY, Joao Carrera MD, 81 mg at 09/22/19 0913    nicotine (NICODERM CQ) 14 mg/24 hr patch 1 Patch, 1 Patch, TransDERmal, DAILY, Christal Oro MD, 1 Patch at 09/21/19 2212        Allergies   Allergen Reactions    Remicade [Infliximab] Other (comments)      MRI Results (most recent):  Results from [place] encounter on 09/21/19   MRI BRAIN WO CONT    Narrative EXAM:  MRI BRAIN WO CONT  INDICATION:  Workup for TIA, patient presented to the ED with acute onset  multiple episodes left-sided numbness on the day of arrival lasting short  periods of time. Upon arrival symptoms had resolved. TECHNIQUE: Sagittal T1, axial FLAIR, T2,T1 and gradient echo images as well as  coronal T2 weighted images and axial diffusion weighted images of the head were  obtained. COMPARISON:  CT, CTA. FINDINGS:  The ventricular size and configuration are normal.   Scattered tiny punctate foci of T2 hyperintensity in the cerebral white matter  without associated restricted diffusion. Nonspecific pattern and possibly  chronic. Otherwise normal signal in the brain. No evidence of hemorrhage, mass, infarct or abnormal extra-axial fluid  collections. Flow voids are present in the vertebral basilar and carotid artery systems. The craniocervical junction is unremarkable. Mild mucosal thickening paranasal sinuses. Impression IMPRESSION: No acute intracranial abnormality demonstrated.          Results from [place] encounter on 09/21/19   MRI BRAIN WO CONT    Narrative EXAM:  MRI BRAIN WO CONT  INDICATION:  Workup for TIA, patient presented to the ED with acute onset  multiple episodes left-sided numbness on the day of arrival lasting short  periods of time. Upon arrival symptoms had resolved. TECHNIQUE: Sagittal T1, axial FLAIR, T2,T1 and gradient echo images as well as  coronal T2 weighted images and axial diffusion weighted images of the head were  obtained. COMPARISON:  CT, CTA. FINDINGS:  The ventricular size and configuration are normal.   Scattered tiny punctate foci of T2 hyperintensity in the cerebral white matter  without associated restricted diffusion. Nonspecific pattern and possibly  chronic. Otherwise normal signal in the brain. No evidence of hemorrhage, mass, infarct or abnormal extra-axial fluid  collections. Flow voids are present in the vertebral basilar and carotid artery systems. The craniocervical junction is unremarkable. Mild mucosal thickening paranasal sinuses. Impression IMPRESSION: No acute intracranial abnormality demonstrated. Review of Systems:  A comprehensive review of systems was negative except for: Neurological: positive for paresthesia   Vitals:    09/22/19 0426 09/22/19 0850 09/22/19 1117 09/22/19 1540   BP: 125/79 (!) 145/95 (!) 139/92 (!) 134/95   Pulse: 71 76 87 86   Resp: 18 18 18 18   Temp: 97.5 °F (36.4 °C) 97.5 °F (36.4 °C) 97.4 °F (36.3 °C) 97.6 °F (36.4 °C)   SpO2: 96% 96% 92% 96%   Weight:       Height:         Objective:     I      NEUROLOGICAL EXAM:    Appearance: The patient is well developed, well nourished, provides a coherent history and is in no acute distress. Mental Status: Oriented to time, place and person, and the president, cognitive function is normal and speech is fluent and no aphasia or dysarthria. Mood and affect appropriate. Cranial Nerves:   Intact visual fields. Fundi are benign, disc are flat, no lesions seen on funduscopy. SHIVAM, EOM's full, no nystagmus, no ptosis. Facial sensation is normal. Corneal reflexes are not tested. Facial movement is symmetric. Hearing is normal bilaterally.  Palate is midline with normal sternocleidomastoid and trapezius muscles are normal. Tongue is midline. Neck without meningismus or bruits  Temporal arteries are not tender or enlarged  TMJ areas are not tender on palpation   Motor:  5/5 strength in upper and lower proximal and distal muscles. Normal bulk and tone. No fasciculations. Rapid alternating movement is symmetric and intact bilaterally   Reflexes:   Deep tendon reflexes 2+/4 and symmetrical.  No babinski or clonus present   Sensory:   Normal to touch, pinprick and vibration and temperature. DSS is intact   Gait:  Normal gait for patient's age. Tremor:   No tremor noted. Cerebellar:  No abnormal cerebellar signs present on Romberg and tandem testing and finger-nose-finger exam.   Neurovascular:  Normal heart sounds and regular rhythm, peripheral pulses intact, and no carotid bruits. Assessment:       ICD-10-CM ICD-9-CM    1. Stroke-like symptoms R29.90 781.99      Active Problems:    TIA (transient ischemic attack) (9/21/2019)      Transient ischemic attack involving right internal carotid artery (9/22/2019)      Bilateral carotid artery stenosis (9/22/2019)        Plan:     Patient with left-sided numbness that came and went for several hours yesterday, etiology unclear, will continue his complete neurovascular evaluation and work-up, so far the MRI scan shows no acute stroke and this is more of a TIA. Needs cardiac monitoring and echocardiogram, and a Doppler has been ordered also. He has no significant obstructive disease on his carotid arteries. That is on the basis CTA, and cardiac evaluation pending as well as his hematologic medical evaluation. We discussed his condition with the patient and his wife in detail, and they agree with plans and therapy as above. Signed By: Ceci Mahajan MD     September 22, 2019       CC: Mila Faustin MD  FAX: [phone]  This note will not be viewable in [address]

## 2019-09-22 NOTE — PROGRESS NOTES
PHYSICAL THERAPY EVALUATION/DISCHARGE  Patient: Genna Pimentel (64 y.o. male)  Date: 9/22/2019  Primary Diagnosis: TIA (transient ischemic attack) [G45.9]       Precautions: none         ASSESSMENT  Based on the objective data described below, the patient presents with baseline functional mobility efforts without LOB or difficulties today. Pt is safe to discharge home from mobility stand-point. Functional Outcome Measure: The patient scored 55/56 on the Bar outcome measure which is indicative of low fall risk. Other factors to consider for discharge: none, spouse present for support at home     Further skilled acute physical therapy is not indicated at this time. PLAN :  Recommendation for discharge: (in order for the patient to meet his/her long term goals)  No skilled physical therapy/ follow up rehabilitation needs identified at this time. This discharge recommendation:  Has been made in collaboration with the attending provider and/or case management    Equipment recommendations for successful discharge: none required/needed       SUBJECTIVE:   Patient stated I feel fine now.     OBJECTIVE DATA SUMMARY:   HISTORY:    Past Medical History:   Diagnosis Date    Chronic insomnia 8/16/2018    Colon polyps 8/14/2018    Crohn's disease ([facility] 8/14/2018    Depression 8/14/2018    Diverticulosis 8/14/2018    Fibromyalgia 8/14/2018    Gastroesophageal reflux disease without esophagitis 8/16/2018    IBS (irritable bowel syndrome) 8/14/2018    Onychomycosis 8/14/2018    Reactive arthritis ([facility] 8/14/2018    Sarcoidosis 8/14/2018     Past Surgical History:   Procedure Laterality Date    HX ORCHIECTOMY Left     HX ORTHOPAEDIC      L3-5 lumbar surgery    HX TONSILLECTOMY         Prior level of function: I with all and no AD use  Personal factors and/or comorbidities impacting plan of care:     Home Situation  Home Environment: Private residence  # Steps to Enter: 6  One/Two Story Residence: One story  Support Systems: Spouse/Significant Other/Partner  Patient Expects to be Discharged to[de-identified] Private residence  Current DME Used/Available at Home: None    EXAMINATION/PRESENTATION/DECISION MAKING:   Critical Behavior:  Neurologic State: Alert  Orientation Level: Appropriate for age, Oriented X4  Cognition: Appropriate decision making, Appropriate for age attention/concentration, Appropriate safety awareness  Safety/Judgement: Awareness of environment, Good awareness of safety precautions  Hearing: Auditory  Auditory Impairment: None  Skin:  Intact  Edema: none  Range Of Motion:  AROM: Within functional limits           PROM: Within functional limits           Strength:    Strength:  Within functional limits                    Tone & Sensation:   Tone: Normal              Sensation: Intact               Coordination:  Coordination: Within functional limits  Vision:   Acuity: Within Defined Limits  Functional Mobility:  Bed Mobility:  Rolling: Independent  Supine to Sit: Independent  Sit to Supine: Independent  Scooting: Independent  Transfers:  Sit to Stand: Independent  Stand to Sit: Independent  Stand Pivot Transfers: Independent                    Balance:   Sitting: Intact  Standing: Intact  Ambulation/Gait Training:  Distance (ft): 200 Feet (ft)     Ambulation - Level of Assistance: Independent     Gait Description (WDL): Within defined limits         Functional Measure:  Bar Balance Test:    Sitting to Standing: 3  Standing Unsupported: 4  Sitting with Back Unsupported: 4  Standing to Sittin  Transfers: 4  Standing Unsupported with Eyes Closed: 4  Standing Unsupported with Feet Together: 4  Reach Forward with Outstretched Arm: 4   Object: 4  Turn to Look Over Shoulders: 4  Turn 360 Degrees: 4  Alternate Foot on Step/Stool: 4  Standing Unsupported One Foot in Front: 4  Stand on One Le  Total: 55/56         56=Maximum possible score;   0-20=High fall risk  21-40=Moderate fall risk 41-56=Low fall risk                  Physical Therapy Evaluation Charge Determination   History Examination Presentation Decision-Making   LOW Complexity : Zero comorbidities / personal factors that will impact the outcome / POC LOW Complexity : 1-2 Standardized tests and measures addressing body structure, function, activity limitation and / or participation in recreation  LOW Complexity : Stable, uncomplicated  Other outcome measures Bar  LOW       Based on the above components, the patient evaluation is determined to be of the following complexity level: LOW     Pain Ratin/10    Activity Tolerance:   Good  Please refer to the flowsheet for vital signs taken during this treatment. After treatment patient left in no apparent distress:   Call bell within reach and Caregiver / family present    COMMUNICATION/EDUCATION:   The patients plan of care was discussed with: Occupational Therapist and Registered Nurse. Fall prevention education was provided and the patient/caregiver indicated understanding.     Thank you for this referral.  Nicki Severe, PT, DPT   Time Calculation: 16 mins

## 2019-09-22 NOTE — PROGRESS NOTES
Hospitalist Progress Note    NAME: Donna Langford. :  1960   MRN:  [id number]       Assessment / Plan:  Left side numbness likely secondary to TIA POA   ECHO pending , but other TIA work up including MRI unremarkable  HbA1c, lipid profile unremarkable   Continue with lisinopril for hypertension  Continue aspirin and statin  Leucocytosis , 11.6 today , could be reactive or related to Recent sinus infection  Amoxicillin and prednisone for 2 more days. Less likely infections origin. UA and chest xray unremarkable   Fibromyalgia  Depression/insomnia  Continue with Cymbalta, trazodone and Ambien  Tobacco abuse  Nicotine patch ordered  Code Status: wife   Surrogate Decision Maker: wife   DVT Prophylaxis: lovenox  GI Prophylaxis: not indicated  Baseline: ambulatory  Body mass index is 29.21 kg/m².: 25.0 - 29.9 Overweight  Code status: Full  Recommended Disposition: Home w/Family Likely tomorrow      Subjective:     Chief Complaint / Reason for Physician Visit  No more numbness , no other issues \". Discussed with RN events overnight. Review of Systems:  Symptom Y/N Comments  Symptom Y/N Comments   Fever/Chills n   Chest Pain n    Poor Appetite    Edema     Cough n   Abdominal Pain n    Sputum    Joint Pain     SOB/JERRY n   Pruritis/Rash     Nausea/vomit    Tolerating PT/OT     Diarrhea    Tolerating Diet     Constipation    Other       Could NOT obtain due to:      Objective:     VITALS:   Last 24hrs VS reviewed since prior progress note.  Most recent are:  Patient Vitals for the past 24 hrs:   Temp Pulse Resp BP SpO2   19 1117 97.4 °F (36.3 °C) 87 18 (!) 139/92 92 %   19 0850 97.5 °F (36.4 °C) 76 18 (!) 145/95 96 %   19 0426 97.5 °F (36.4 °C) 71 18 125/79 96 %   19 2317 97.6 °F (36.4 °C) 88 18 116/77 94 %   19 1846 98.5 °F (36.9 °C) 86 18 (!) 145/94 98 %   19 1700  87 17 139/81 97 %   19 1634    128/90 95 %   19 1518 98.4 °F (36.9 °C) 88 16 143/87 99 % No intake or output data in the 24 hours ending 09/22/19 1505     PHYSICAL EXAM:  General: WD, WN. Alert, cooperative, no acute distress    EENT:  EOMI. Anicteric sclerae. MMM  Resp:  CTA bilaterally, no wheezing or rales. No accessory muscle use  CV:  Regular  rhythm,  No edema  GI:  Soft, Non distended, Non tender.  +Bowel sounds  Neurologic:  Alert and oriented X 3, normal speech,   Psych:   Good insight. Not anxious nor agitated  Skin:  No rashes. No jaundice    Reviewed most current lab test results and cultures  YES  Reviewed most current radiology test results   YES  Review and summation of old records today    NO  Reviewed patient's current orders and MAR    YES  PMH/SH reviewed - no change compared to H&P  ________________________________________________________________________  Care Plan discussed with:    Comments   Patient y    Family  y    RN y    Care Manager     Consultant                        Multidiciplinary team rounds were held today with , nursing, pharmacist and clinical coordinator. Patient's plan of care was discussed; medications were reviewed and discharge planning was addressed. ________________________________________________________________________  Total NON critical care TIME:  35   Minutes    Total CRITICAL CARE TIME Spent:   Minutes non procedure based      Comments   >50% of visit spent in counseling and coordination of care     ________________________________________________________________________  Warren Bass MD     Procedures: see electronic medical records for all procedures/Xrays and details which were not copied into this note but were reviewed prior to creation of Plan. LABS:  I reviewed today's most current labs and imaging studies.   Pertinent labs include:  Recent Labs     09/21/19  1554   WBC 11.6*   HGB 15.1   HCT 45.4        Recent Labs     09/22/19  0148 09/21/19  1554   NA  --  140   K  --  4.0   CL  --  107   CO2  -- 30   GLU  --  96   BUN  --  25*   CREA  --  1.19   CA  --  8.6   MG 2.4  --    ALB  --  3.6   TBILI  --  0.3   SGOT  --  21   ALT  --  33   INR  --  1.0       Signed:  Radha Hassan MD

## 2019-09-22 NOTE — PROGRESS NOTES
Problem: Falls - Risk of  Goal: *Absence of Falls  Description  Document David Cardona Fall Risk and appropriate interventions in the flowsheet.   Outcome: Progressing Towards Goal  Note:   Fall Risk Interventions:

## 2019-09-22 NOTE — PROGRESS NOTES
OCCUPATIONAL THERAPY EVALUATION/DISCHARGE  Patient: Rosita Kawasaki. (64 y.o. male)  Date: 9/22/2019  Primary Diagnosis: TIA (transient ischemic attack) [G45.9]       Precautions: fall      ASSESSMENT  Based on the objective data described below, the patient presents with intact ADL and mobility. L arm numbness has resolved   Has no concerns at this time. Current Level of Function (ADLs/self-care): Independent    Functional Outcome Measure: The patient scored 66/66 on the Barthel Index outcome measure which is indicative of intact strength and mobility. Other factors to consider for discharge: none     PLAN :  Recommendation for discharge: (in order for the patient to meet his/her long term goals)  No skilled occupational therapy/ follow up rehabilitation needs identified at this time. This discharge recommendation:  A follow-up discussion with the attending provider and/or case management is planned    Equipment recommendations for successful discharge: none       SUBJECTIVE:   Patient stated I'm feeling fine.     OBJECTIVE DATA SUMMARY:   HISTORY:   Past Medical History:   Diagnosis Date    Chronic insomnia 8/16/2018    Colon polyps 8/14/2018    Crohn's disease ([facility] [address] 8/14/2018    Depression 8/14/2018    Diverticulosis 8/14/2018    Fibromyalgia 8/14/2018    Gastroesophageal reflux disease without esophagitis 8/16/2018    IBS (irritable bowel syndrome) 8/14/2018    Onychomycosis 8/14/2018    Reactive arthritis ([facility] [address] 8/14/2018    Sarcoidosis 8/14/2018     Past Surgical History:   Procedure Laterality Date    HX ORCHIECTOMY Left     HX ORTHOPAEDIC      L3-5 lumbar surgery    HX TONSILLECTOMY         Prior Level of Function/Environment/Context: independent baseline  Expanded or extensive additional review of patient history:     Home Situation  Home Environment: Private residence  # Steps to Enter: 6  One/Two Story Residence: One story  Support Systems: Spouse/Significant Other/Partner  Patient Expects to be Discharged to[de-identified] Private residence  Current DME Used/Available at Home: None    Hand dominance: Right    EXAMINATION OF PERFORMANCE DEFICITS:  Cognitive/Behavioral Status:  Neurologic State: Alert  Orientation Level: Appropriate for age;Oriented X4  Cognition: Appropriate decision making; Appropriate for age attention/concentration; Appropriate safety awareness        Safety/Judgement: Awareness of environment;Good awareness of safety precautions    Skin: intact    Edema: none    Hearing: Auditory  Auditory Impairment: None    Vision/Perceptual:                           Acuity: Within Defined Limits         Range of Motion:    AROM: Within functional limits  PROM: Within functional limits                      Strength:    Strength: Within functional limits                Coordination:  Coordination: Within functional limits  Fine Motor Skills-Upper: Left Intact; Right Intact    Gross Motor Skills-Upper: Left Intact; Right Intact    Tone & Sensation:    Tone: Normal  Sensation: Intact                      Balance:  Sitting: Intact  Standing: Intact    Functional Mobility and Transfers for ADLs:  Bed Mobility:  Rolling: Independent  Supine to Sit: Independent  Sit to Supine: Independent  Scooting: Independent    Transfers:  Sit to Stand: Independent  Stand to Sit: Independent  Bathroom Mobility: Independent    ADL Assessment:  Feeding: Independent    Oral Facial Hygiene/Grooming: Independent    Bathing: Independent    Upper Body Dressing: Independent    Lower Body Dressing: Independent    Toileting: Independent                ADL Intervention and task modifications:                                     Cognitive Retraining  Safety/Judgement: Awareness of environment;Good awareness of safety precautions       Functional Measure:  Fugl-Mireles Assessment of Motor Recovery after Stroke:   Reflex Activity  Flexors/Biceps/Fingers: Can be elicited  Extensors/Triceps: Can be elicited  Reflex Subtotal: 4    Volitional Movement Within Synergies  Shoulder Retraction: Full  Shoulder Elevation: Full  Shoulder Abduction (90 degrees): Full  Shoulder External Rotation: Full  Elbow Flexion: Full  Forearm Supination: Full  Shoulder Adduction/Internal Rotation: Full  Elbow Extension: Full  Forearm Pronation: Full  Subtotal: 18    Volitional Movement Mixing Synergies  Hand to Lumbar Spine: Full  Shoulder Flexion (0-90 degrees): Full  Pronation-Supination: Full  Subtotal: 6    Volitional Movement With Little or No Synergy  Shoulder Abduction (0-90 degrees): Full  Shoulder Flexion ( degrees): Full  Pronation/Supination: Full  Subtotal : 6    Normal Reflex Activity  Biceps, Triceps, Finger Flexors: Full  Subtotal : 2    Upper Extremity Total   Upper Extremity Total: 36    Wrist  Stability at 15 Degree Dorsiflexion: Full  Repeated Dorsiflexion/ Volar Flexion: Full  Stability at 15 Degree Dorsiflexion: Full  Repeated Dorsiflexion/ Volar Flexion: Full  Circumduction: Full  Wrist Total: 10    Hand  Mass Flexion: Full  Mass Extension: Full  Grasp A: Full  Grasp B: Full  Grasp C: Full  Grasp D: Full  Grasp E: Full  Hand Total: 14    Coordination/Speed  Tremor: None  Dysmetria: None  Time: <1s  Coordination/Speed Total : 6    Total A-D  Total A-D (Motor Function): 66/66     This is a reliable/valid measure of arm function after a neurological event. It has established value to characterize functional status and for measuring spontaneous and therapy-induced recovery; tests proximal and distal motor functions. Fugl-Mireles Assessment  UE scores recorded between five and 30 days post neurologic event can be used to predict UE recovery at six months post neurologic event. Severe = 0-21 points   Moderately Severe = 22-33 points   Moderate = 34-47 points   Mild = 48-66 points  NUNU Calderón, ANGELO Grady, & DIMITRIOS Elaine (1992). Measurement of motor recovery after stroke:  Outcome assessment and sample size requirements. Stroke, 23, pp. 0047-9094.   ------------------------------------------------------------------------------------------------------------------------------------------------------------------  MCID:  Stroke:   Martha Felipe et al, 2001; n = 171; mean age 79 (6) years; assessed within 16 (12) days of stroke, Acute Stroke)  FMA Motor Scores from Admission to Discharge   10 point increase in FMA Upper Extremity = 1.5 change in discharge FIM   10 point increase in FMA Lower Extremity = 1.9 change in discharge FIM  MDC:   Stroke:   Meng Dixon et al, 2008, n = 14, mean age = 59.9 (14.6) years, assessed on average 14 (6.5) months post stroke, Chronic Stroke)   FMA = 5.2 points for the Upper Extremity portion of the assessment     Occupational Therapy Evaluation Charge Determination   History Examination Decision-Making   LOW Complexity : Brief history review  LOW Complexity : 1-3 performance deficits relating to physical, cognitive , or psychosocial skils that result in activity limitations and / or participation restrictions  LOW Complexity : No comorbidities that affect functional and no verbal or physical assistance needed to complete eval tasks       Based on the above components, the patient evaluation is determined to be of the following complexity level: LOW   Pain Rating:  No pain    Activity Tolerance: WNL  Please refer to the flowsheet for vital signs taken during this treatment. After treatment patient left in no apparent distress:    Supine in bed    COMMUNICATION/EDUCATION:   The patients plan of care was discussed with: Physical Therapist and Registered Nurse. Patient was educated regarding his deficit(s) of none as this relates to his diagnosis of TIA. He demonstrated Good understanding as evidenced by verbal feedback. Patient and/or family was verbally educated on the BE FAST acronym for signs/symptoms of CVA and TIA.  BE FAST was written on patient's communication board  for visual education and reinforcement. Booklet in room reviewed briefly. All questions answered with patient indicating good understanding.      Thank you for this referral.  Sonya Prince  Time Calculation: 14 mins

## 2019-09-22 NOTE — PROGRESS NOTES
JAYME Plan:    1) Disposition-Home  2) F/U with PCP/specialist    Reason for Admission:   TIA                   RRAT Score:     4                Plan for utilizing home health:      None                    Current Advanced Directive/Advance Care Plan: On file                         Transition of Care Plan:   Patient admitted for W/U of  TIA. Upon interview patient was up ab sophia in room with spouse at bedside. Patient verified demographics, NOK and PCP. Patient reports independence and plans to return home. Care Management Interventions  PCP Verified by CM:  Yes  Discharge Durable Medical Equipment: No  Physical Therapy Consult: Yes  Occupational Therapy Consult: Yes  Speech Therapy Consult: Yes  Confirm Follow Up Transport: Family  Plan discussed with Pt/Family/Caregiver: Yes  Freedom of Choice Offered: Yes  Discharge Location  Discharge Placement: [facility] [address] [id number]

## 2019-09-22 NOTE — ROUTINE PROCESS
Bedside and Verbal shift change report given to Jacquline Meckel (oncoming nurse) by Jackie (offgoing nurse). Report included the following information SBAR, Kardex, Intake/Output and MAR. 
  
Zone Phone:   [phone] 
  
  
Significant changes during shift:  None 
  
  
  
Patient Information 
  
Natalie Simms. 
61 y.o. 
9/21/2019  3:50 PM by Fabiano Vega MD. Natalie Simms. was admitted from Home 
  
Problem List 
  
     
Patient Active Problem List  
  Diagnosis Date Noted  Essential hypertension 07/08/2019  
    Priority: 1 - One  Gastroesophageal reflux disease without esophagitis 08/16/2018  
    Priority: 1 - One  Chronic insomnia 08/16/2018  
    Priority: 1 - One  Reactive arthritis ([address] 08/14/2018  
    Priority: 1 - One  Colon polyps 08/14/2018  
    Priority: 1 - One  Crohn's disease ([address] 08/14/2018  
    Priority: 1 - One  Diverticulosis 08/14/2018  
    Priority: 1 - One  
 Fibromyalgia 08/14/2018  
    Priority: 1 - One  
 IBS (irritable bowel syndrome) 08/14/2018  
    Priority: 1 - One  
 Onychomycosis 08/14/2018  
    Priority: 1 - One  Sarcoidosis 08/14/2018  
    Priority: 1 - One  TIA (transient ischemic attack) 09/21/2019  Depression, major, recurrent, mild ([address] 08/20/2019  Tobacco dependence 08/14/2018  
  
    
Past Medical History:  
Diagnosis Date  Chronic insomnia 8/16/2018  Colon polyps 8/14/2018  Crohn's disease ([address] 8/14/2018  Depression 8/14/2018  Diverticulosis 8/14/2018  Fibromyalgia 8/14/2018  Gastroesophageal reflux disease without esophagitis 8/16/2018  IBS (irritable bowel syndrome) 8/14/2018  Onychomycosis 8/14/2018  Reactive arthritis ([address] 8/14/2018  Sarcoidosis 8/14/2018  
  
  
  
Core Measures: 
  
CVA: Yes Yes 
  
Activity Status: 
  
OOB to Chair Yes Ambulated this shift Yes  
  
  
DVT prophylaxis: 
  
DVT prophylaxis Med- Yes DVT prophylaxis SCD or KRISTY- No  
  
Wounds: (If Applicable) 
  
 Wounds- No 
  
Patient Safety: 
  
Falls Score Total Score: 1 Safety Level_______ Bed Alarm On? No 
Sitter? No 
  
Plan for upcoming shift: safety, neuro checks, still needs echo 
  
  
  
Discharge Plan: Yes when all test/consults completed 
  
Active Consults: 
IP CONSULT TO NEUROLOGY

## 2019-09-22 NOTE — PROGRESS NOTES
Problem: Falls - Risk of  Goal: *Absence of Falls  Description  Document Rudolm Constable Fall Risk and appropriate interventions in the flowsheet. 9/21/2019 2038 by Gely Silva RN  Outcome: Progressing Towards Goal  Note:   Fall Risk Interventions:            Medication Interventions: Bed/chair exit alarm                9/21/2019 2030 by Gely Silva RN  Outcome: Progressing Towards Goal  Note:   Fall Risk Interventions:                                Problem: Falls - Risk of  Goal: *Absence of Falls  Description  Document Rudolm Constable Fall Risk and appropriate interventions in the flowsheet.   9/21/2019 2038 by Gely Silva RN  Outcome: Progressing Towards Goal  Note:   Fall Risk Interventions:            Medication Interventions: Bed/chair exit alarm                9/21/2019 2030 by Gely Silva RN  Outcome: Progressing Towards Goal  Note:   Fall Risk Interventions:                                Problem: Patient Education: Go to Patient Education Activity  Goal: Patient/Family Education  9/21/2019 2038 by Gely Silva RN  Outcome: Progressing Towards Goal  9/21/2019 2030 by Gely Silva RN  Outcome: Progressing Towards Goal     Problem: TIA/CVA Stroke: 0-24 hours  Goal: Off Pathway (Use only if patient is Off Pathway)  9/21/2019 2038 by Gely Silva RN  Outcome: Progressing Towards Goal  9/21/2019 2030 by Gely Silva RN  Outcome: Progressing Towards Goal  Goal: Activity/Safety  9/21/2019 2038 by Gely Silva RN  Outcome: Progressing Towards Goal  9/21/2019 2030 by Gely Silva RN  Outcome: Progressing Towards Goal  Goal: Consults, if ordered  9/21/2019 2038 by Gely Sivla RN  Outcome: Progressing Towards Goal  9/21/2019 2030 by Gely Silva RN  Outcome: Progressing Towards Goal  Goal: Diagnostic Test/Procedures  9/21/2019 2038 by Gely Silva RN  Outcome: Progressing Towards Goal  9/21/2019 2030 by Gely Silva RN  Outcome: Progressing Towards Goal  Goal: Nutrition/Diet  9/21/2019 2038 by Viji Sibley RN  Outcome: Progressing Towards Goal  9/21/2019 2030 by Viji Sibley RN  Outcome: Progressing Towards Goal  Goal: Discharge Planning  9/21/2019 2038 by Viji Sibley RN  Outcome: Progressing Towards Goal  9/21/2019 2030 by Viji Sibley RN  Outcome: Progressing Towards Goal  Goal: Medications  9/21/2019 2038 by Viji Sibley RN  Outcome: Progressing Towards Goal  9/21/2019 2030 by Viji Sibley RN  Outcome: Progressing Towards Goal  Goal: Respiratory  9/21/2019 2038 by Viji Sibley RN  Outcome: Progressing Towards Goal  9/21/2019 2030 by Viji Sibley RN  Outcome: Progressing Towards Goal  Goal: Treatments/Interventions/Procedures  9/21/2019 2038 by Viji Sibley RN  Outcome: Progressing Towards Goal  9/21/2019 2030 by Viji Sibley RN  Outcome: Progressing Towards Goal  Goal: Minimize risk of bleeding post-thrombolytic infusion  9/21/2019 2038 by Viji Sibley RN  Outcome: Progressing Towards Goal  9/21/2019 2030 by Viji Sibley RN  Outcome: Progressing Towards Goal  Goal: Monitor for complications post-thrombolytic infusion  9/21/2019 2038 by Viji Sibley RN  Outcome: Progressing Towards Goal  9/21/2019 2030 by Viji Sibley RN  Outcome: Progressing Towards Goal  Goal: Psychosocial  9/21/2019 2038 by Viji Sibley RN  Outcome: Progressing Towards Goal  9/21/2019 2030 by Viji Sibley RN  Outcome: Progressing Towards Goal  Goal: *Hemodynamically stable  9/21/2019 2038 by Viji Sibley RN  Outcome: Progressing Towards Goal  9/21/2019 2030 by Viji Sibley RN  Outcome: Progressing Towards Goal  Goal: *Neurologically stable  Description  Absence of additional neurological deficits    9/21/2019 2038 by Viij Sibley RN  Outcome: Progressing Towards Goal  9/21/2019 2030 by Viji Sibley RN  Outcome: Progressing Towards Goal  Goal: *Verbalizes anxiety and depression are reduced or absent  9/21/2019 2038 by Viji Sibley RN  Outcome: Progressing Towards Goal  9/21/2019 2030 by Quique Nelson, RN  Outcome: Progressing Towards Goal  Goal: *Absence of Signs of Aspiration on Current Diet  9/21/2019 2038 by Quique Nelson, RN  Outcome: Progressing Towards Goal  9/21/2019 2030 by Quique Nelson, RN  Outcome: Progressing Towards Goal  Goal: *Absence of deep venous thrombosis signs and symptoms(Stroke Metric)  9/21/2019 2038 by Quique Nelson, RN  Outcome: Progressing Towards Goal  9/21/2019 2030 by Quique Nelson, RN  Outcome: Progressing Towards Goal  Goal: *Ability to perform ADLs and demonstrates progressive mobility and function  9/21/2019 2038 by Quique Nelsno, RN  Outcome: Progressing Towards Goal  9/21/2019 2030 by Quique Nelson, RN  Outcome: Progressing Towards Goal  Goal: *Stroke education started(Stroke Metric)  9/21/2019 2038 by Quique Nelson, RN  Outcome: Progressing Towards Goal  9/21/2019 2030 by Quique Nelson, RN  Outcome: Progressing Towards Goal  Goal: *Dysphagia screen performed(Stroke Metric)  9/21/2019 2038 by Quique Nelson, RN  Outcome: Progressing Towards Goal  9/21/2019 2030 by Quique Nelson, RN  Outcome: Progressing Towards Goal  Goal: *Rehab consulted(Stroke Metric)  9/21/2019 2038 by Quique Nelson, RN  Outcome: Progressing Towards Goal  9/21/2019 2030 by Quique Nelson, RN  Outcome: Progressing Towards Goal     Problem: TIA/CVA Stroke: Day 2 Until Discharge  Goal: Off Pathway (Use only if patient is Off Pathway)  9/21/2019 2038 by Quique Nelson, RN  Outcome: Progressing Towards Goal  9/21/2019 2030 by Quique Nelson, RN  Outcome: Progressing Towards Goal  Goal: Activity/Safety  9/21/2019 2038 by Quique Nelson, RN  Outcome: Progressing Towards Goal  9/21/2019 2030 by Quique Nelson, RN  Outcome: Progressing Towards Goal  Goal: Diagnostic Test/Procedures  9/21/2019 2038 by Quique Nelson, RN  Outcome: Progressing Towards Goal  9/21/2019 2030 by Quique Nelson, RN  Outcome: Progressing Towards Goal  Goal: Nutrition/Diet  9/21/2019 2038 by Quique Nelson, RN  Outcome: Progressing Towards Goal  9/21/2019 2030 by Viji Sibley RN  Outcome: Progressing Towards Goal  Goal: Discharge Planning  9/21/2019 2038 by Viji Sibley RN  Outcome: Progressing Towards Goal  9/21/2019 2030 by Viji Sibley RN  Outcome: Progressing Towards Goal  Goal: Medications  9/21/2019 2038 by Viji Sibley RN  Outcome: Progressing Towards Goal  9/21/2019 2030 by Viji Sibley RN  Outcome: Progressing Towards Goal  Goal: Respiratory  9/21/2019 2038 by Viji Sibley RN  Outcome: Progressing Towards Goal  9/21/2019 2030 by Viji Sibley RN  Outcome: Progressing Towards Goal  Goal: Treatments/Interventions/Procedures  9/21/2019 2038 by Viji Sibley RN  Outcome: Progressing Towards Goal  9/21/2019 2030 by Viji Sibley RN  Outcome: Progressing Towards Goal  Goal: Psychosocial  9/21/2019 2038 by Viji Sibley RN  Outcome: Progressing Towards Goal  9/21/2019 2030 by Viji Sibley RN  Outcome: Progressing Towards Goal  Goal: *Verbalizes anxiety and depression are reduced or absent  9/21/2019 2038 by Viji Sibley RN  Outcome: Progressing Towards Goal  9/21/2019 2030 by Viji Sibely RN  Outcome: Progressing Towards Goal  Goal: *Absence of aspiration  9/21/2019 2038 by Viji Sibley RN  Outcome: Progressing Towards Goal  9/21/2019 2030 by Viji Sibley RN  Outcome: Progressing Towards Goal  Goal: *Absence of deep venous thrombosis signs and symptoms(Stroke Metric)  9/21/2019 2038 by Viji Sibley RN  Outcome: Progressing Towards Goal  9/21/2019 2030 by Viji Sibley RN  Outcome: Progressing Towards Goal  Goal: *Optimal pain control at patient's stated goal  9/21/2019 2038 by Viji Sibley RN  Outcome: Progressing Towards Goal  9/21/2019 2030 by Viji Sibley RN  Outcome: Progressing Towards Goal  Goal: *Tolerating diet  9/21/2019 2038 by Viji Sibley RN  Outcome: Progressing Towards Goal  9/21/2019 2030 by Viji Sibley RN  Outcome: Progressing Towards Goal  Goal: *Ability to perform ADLs and demonstrates progressive mobility and function  9/21/2019 2038 by Nevin Hewitt RN  Outcome: Progressing Towards Goal  9/21/2019 2030 by Nevin Hewitt RN  Outcome: Progressing Towards Goal  Goal: *Stroke education continued(Stroke Metric)  9/21/2019 2038 by Nevin Hewitt RN  Outcome: Progressing Towards Goal  9/21/2019 2030 by Nevin Hewitt RN  Outcome: Progressing Towards Goal     Problem: Ischemic Stroke: Discharge Outcomes  Goal: *Verbalizes anxiety and depression are reduced or absent  9/21/2019 2038 by Nevin Hewitt RN  Outcome: Progressing Towards Goal  9/21/2019 2030 by Nevin Hewitt RN  Outcome: Progressing Towards Goal  Goal: *Verbalize understanding of risk factor modification(Stroke Metric)  9/21/2019 2038 by Nevin Hewitt RN  Outcome: Progressing Towards Goal  9/21/2019 2030 by Nevin Hewitt RN  Outcome: Progressing Towards Goal  Goal: *Hemodynamically stable  9/21/2019 2038 by Nevin Hewitt RN  Outcome: Progressing Towards Goal  9/21/2019 2030 by Nevin Hewitt RN  Outcome: Progressing Towards Goal  Goal: *Absence of aspiration pneumonia  9/21/2019 2038 by Nevin Hewitt RN  Outcome: Progressing Towards Goal  9/21/2019 2030 by Nevin Hewitt RN  Outcome: Progressing Towards Goal  Goal: *Aware of needed dietary changes  9/21/2019 2038 by Nevin Hewitt RN  Outcome: Progressing Towards Goal  9/21/2019 2030 by Nevin Hewitt RN  Outcome: Progressing Towards Goal  Goal: *Verbalize understanding of prescribed medications including anti-coagulants, anti-lipid, and/or anti-platelets(Stroke Metric)  9/21/2019 2038 by Nevin Hewitt RN  Outcome: Progressing Towards Goal  9/21/2019 2030 by Nevin Hewitt RN  Outcome: Progressing Towards Goal  Goal: *Tolerating diet  9/21/2019 2038 by Nevin Hewitt RN  Outcome: Progressing Towards Goal  9/21/2019 2030 by Nevin Hewitt RN  Outcome: Progressing Towards Goal  Goal: *Aware of follow-up diagnostics related to anticoagulants  9/21/2019 2038 by Nevin Hewitt RN  Outcome: Progressing Towards Goal  9/21/2019 2030 by Mary Mcwilliams RN  Outcome: Progressing Towards Goal  Goal: *Ability to perform ADLs and demonstrates progressive mobility and function  9/21/2019 2038 by Mary Mcwilliams RN  Outcome: Progressing Towards Goal  9/21/2019 2030 by Mary Mcwilliams RN  Outcome: Progressing Towards Goal  Goal: *Absence of DVT(Stroke Metric)  9/21/2019 2038 by Mary Mcwilliams RN  Outcome: Progressing Towards Goal  9/21/2019 2030 by Mary Mcwilliams RN  Outcome: Progressing Towards Goal  Goal: *Absence of aspiration  9/21/2019 2038 by Mary Mcwilliams RN  Outcome: Progressing Towards Goal  9/21/2019 2030 by Mary Mcwilliams RN  Outcome: Progressing Towards Goal  Goal: *Optimal pain control at patient's stated goal  9/21/2019 2038 by Mary Mcwilliams RN  Outcome: Progressing Towards Goal  9/21/2019 2030 by Mary Mcwilliams RN  Outcome: Progressing Towards Goal  Goal: *Home safety concerns addressed  9/21/2019 2038 by Mary Mcwilliams RN  Outcome: Progressing Towards Goal  9/21/2019 2030 by Mary Mcwilliams RN  Outcome: Progressing Towards Goal  Goal: *Describes available resources and support systems  9/21/2019 2038 by Mary Mcwilliams RN  Outcome: Progressing Towards Goal  9/21/2019 2030 by Mary Mcwilliams RN  Outcome: Progressing Towards Goal  Goal: *Verbalizes understanding of activation of EMS(911) for stroke symptoms(Stroke Metric)  9/21/2019 2038 by Mary Mcwilliams RN  Outcome: Progressing Towards Goal  9/21/2019 2030 by Mary Mcwilliams RN  Outcome: Progressing Towards Goal  Goal: *Understands and describes signs and symptoms to report to providers(Stroke Metric)  9/21/2019 2038 by Mary Mcwilliams RN  Outcome: Progressing Towards Goal  9/21/2019 2030 by Mary Mcwilliams RN  Outcome: Progressing Towards Goal  Goal: *Neurolgocially stable (absence of additional neurological deficits)  9/21/2019 2038 by Mary Mcwilliams RN  Outcome: Progressing Towards Goal  9/21/2019 2030 by Mary Mcwilliams RN  Outcome: Progressing Towards Goal  Goal: *Verbalizes importance of follow-up with primary care physician(Stroke Metric)  9/21/2019 2038 by Minesh Nagel RN  Outcome: Progressing Towards Goal  9/21/2019 2030 by Minesh Nagel RN  Outcome: Progressing Towards Goal  Goal: *Smoking cessation discussed,if applicable(Stroke Metric)  9/21/2019 2038 by Minesh Nagel RN  Outcome: Progressing Towards Goal  9/21/2019 2030 by Minesh Nagel RN  Outcome: Progressing Towards Goal  Goal: *Depression screening completed(Stroke Metric)  9/21/2019 2038 by Minesh Nagel RN  Outcome: Progressing Towards Goal  9/21/2019 2030 by Minesh Nagel RN  Outcome: Progressing Towards Goal

## 2019-09-23 ENCOUNTER — TELEPHONE (OUTPATIENT)
Dept: NEUROLOGY | Age: 59
End: 2019-09-23

## 2019-09-23 ENCOUNTER — APPOINTMENT (OUTPATIENT)
Dept: NON INVASIVE DIAGNOSTICS | Age: 59
End: 2019-09-23
Attending: INTERNAL MEDICINE
Payer: COMMERCIAL

## 2019-09-23 VITALS
TEMPERATURE: 97.4 F | SYSTOLIC BLOOD PRESSURE: 145 MMHG | DIASTOLIC BLOOD PRESSURE: 96 MMHG | OXYGEN SATURATION: 99 % | BODY MASS INDEX: 29.12 KG/M2 | HEART RATE: 84 BPM | HEIGHT: 72 IN | WEIGHT: 215 LBS | RESPIRATION RATE: 16 BRPM

## 2019-09-23 LAB
BASOPHILS # BLD: 0 K/UL (ref 0–0.1)
BASOPHILS NFR BLD: 0 % (ref 0–1)
DIFFERENTIAL METHOD BLD: ABNORMAL
ECHO AV AREA PEAK VELOCITY: 1.4 CM2
ECHO AV AREA/BSA PEAK VELOCITY: 0.6 CM2/M2
ECHO AV PEAK GRADIENT: 9.9 MMHG
ECHO AV PEAK VELOCITY: 157.44 CM/S
ECHO LA AREA 4C: 15.7 CM2
ECHO LA MAJOR AXIS: 2.7 CM
ECHO LA VOL 2C: 37.91 ML (ref 18–58)
ECHO LA VOL 4C: 30.61 ML (ref 18–58)
ECHO LA VOL BP: 38.33 ML (ref 18–58)
ECHO LA VOL/BSA BIPLANE: 17.45 ML/M2 (ref 16–28)
ECHO LA VOLUME INDEX A2C: 17.26 ML/M2 (ref 16–28)
ECHO LA VOLUME INDEX A4C: 13.93 ML/M2 (ref 16–28)
ECHO LV E' SEPTAL VELOCITY: 6.36 CM/S
ECHO LV INTERNAL DIMENSION DIASTOLIC: 3.6 CM (ref 4.2–5.9)
ECHO LV INTERNAL DIMENSION SYSTOLIC: 2.82 CM
ECHO LV IVSD: 1.31 CM (ref 0.6–1)
ECHO LV MASS 2D: 191.7 G (ref 88–224)
ECHO LV MASS INDEX 2D: 87.3 G/M2 (ref 49–115)
ECHO LV POSTERIOR WALL DIASTOLIC: 1.34 CM (ref 0.6–1)
ECHO LVOT DIAM: 1.79 CM
ECHO LVOT PEAK GRADIENT: 3.2 MMHG
ECHO LVOT PEAK VELOCITY: 89.97 CM/S
ECHO MV A VELOCITY: 84.51 CM/S
ECHO MV E DECELERATION TIME (DT): 244.8 MS
ECHO MV E VELOCITY: 64.96 CM/S
ECHO MV E/A RATIO: 0.77
ECHO MV E/E' SEPTAL: 10.21
ECHO MV REGURGITANT PEAK GRADIENT: 28.8 MMHG
ECHO MV REGURGITANT PEAK VELOCITY: 268.27 CM/S
ECHO RA AREA 4C: 12.32 CM2
ECHO TV REGURGITANT MAX VELOCITY: 241.69 CM/S
ECHO TV REGURGITANT PEAK GRADIENT: 23.4 MMHG
EOSINOPHIL # BLD: 0.1 K/UL (ref 0–0.4)
EOSINOPHIL NFR BLD: 1 % (ref 0–7)
ERYTHROCYTE [DISTWIDTH] IN BLOOD BY AUTOMATED COUNT: 12.9 % (ref 11.5–14.5)
HCT VFR BLD AUTO: 44.5 % (ref 36.6–50.3)
HGB BLD-MCNC: 15.4 G/DL (ref 12.1–17)
IMM GRANULOCYTES # BLD AUTO: 0.1 K/UL (ref 0–0.04)
IMM GRANULOCYTES NFR BLD AUTO: 1 % (ref 0–0.5)
LYMPHOCYTES # BLD: 2.6 K/UL (ref 0.8–3.5)
LYMPHOCYTES NFR BLD: 22 % (ref 12–49)
MCH RBC QN AUTO: 31.6 PG (ref 26–34)
MCHC RBC AUTO-ENTMCNC: 34.6 G/DL (ref 30–36.5)
MCV RBC AUTO: 91.2 FL (ref 80–99)
MONOCYTES # BLD: 0.9 K/UL (ref 0–1)
MONOCYTES NFR BLD: 8 % (ref 5–13)
NEUTS SEG # BLD: 8.2 K/UL (ref 1.8–8)
NEUTS SEG NFR BLD: 68 % (ref 32–75)
NRBC # BLD: 0 K/UL (ref 0–0.01)
NRBC BLD-RTO: 0 PER 100 WBC
PLATELET # BLD AUTO: 307 K/UL (ref 150–400)
PMV BLD AUTO: 10.5 FL (ref 8.9–12.9)
RBC # BLD AUTO: 4.88 M/UL (ref 4.1–5.7)
WBC # BLD AUTO: 11.8 K/UL (ref 4.1–11.1)

## 2019-09-23 PROCEDURE — 74011250636 HC RX REV CODE- 250/636: Performed by: INTERNAL MEDICINE

## 2019-09-23 PROCEDURE — 85025 COMPLETE CBC W/AUTO DIFF WBC: CPT

## 2019-09-23 PROCEDURE — 93306 TTE W/DOPPLER COMPLETE: CPT

## 2019-09-23 PROCEDURE — 74011250637 HC RX REV CODE- 250/637: Performed by: INTERNAL MEDICINE

## 2019-09-23 PROCEDURE — 36415 COLL VENOUS BLD VENIPUNCTURE: CPT

## 2019-09-23 PROCEDURE — 90686 IIV4 VACC NO PRSV 0.5 ML IM: CPT | Performed by: INTERNAL MEDICINE

## 2019-09-23 PROCEDURE — 74011250637 HC RX REV CODE- 250/637: Performed by: FAMILY MEDICINE

## 2019-09-23 PROCEDURE — 74011636637 HC RX REV CODE- 636/637: Performed by: INTERNAL MEDICINE

## 2019-09-23 PROCEDURE — 99218 HC RM OBSERVATION: CPT

## 2019-09-23 PROCEDURE — 90471 IMMUNIZATION ADMIN: CPT

## 2019-09-23 RX ORDER — PRAVASTATIN SODIUM 40 MG/1
40 TABLET ORAL
Qty: 30 TAB | Refills: 0 | Status: SHIPPED | OUTPATIENT
Start: 2019-09-23 | End: 2019-10-18 | Stop reason: SDUPTHER

## 2019-09-23 RX ORDER — GUAIFENESIN 100 MG/5ML
81 LIQUID (ML) ORAL DAILY
Qty: 30 TAB | Refills: 0 | Status: SHIPPED | OUTPATIENT
Start: 2019-09-24 | End: 2019-10-18 | Stop reason: SDUPTHER

## 2019-09-23 RX ADMIN — Medication 10 ML: at 03:07

## 2019-09-23 RX ADMIN — INFLUENZA VIRUS VACCINE 0.5 ML: 15; 15; 15; 15 SUSPENSION INTRAMUSCULAR at 14:54

## 2019-09-23 RX ADMIN — PANTOPRAZOLE SODIUM 40 MG: 40 TABLET, DELAYED RELEASE ORAL at 09:13

## 2019-09-23 RX ADMIN — AMOXICILLIN 500 MG: 250 CAPSULE ORAL at 09:13

## 2019-09-23 RX ADMIN — LISINOPRIL 20 MG: 20 TABLET ORAL at 09:13

## 2019-09-23 RX ADMIN — DULOXETINE HYDROCHLORIDE 60 MG: 30 CAPSULE, DELAYED RELEASE ORAL at 09:13

## 2019-09-23 RX ADMIN — PREDNISONE 40 MG: 20 TABLET ORAL at 09:13

## 2019-09-23 RX ADMIN — ASPIRIN 81 MG 81 MG: 81 TABLET ORAL at 09:13

## 2019-09-23 NOTE — PROGRESS NOTES
Discharge instructions including follow up appointments and changes to medications and not driving until cleared by neurology reviewed with patient and wife. They verbalized understanding. Patient discharged via wheelchair by volunteer.

## 2019-09-23 NOTE — PROGRESS NOTES
TRANSITION OF CARE PLAN:     Plan A: Home with family    CM spoke with pt and his spouse who was at bedside. Pt denies any needs for discharge. Care Management has completed the discharge planning needs of the patient at this time. Care Management Interventions  PCP Verified by CM:  Yes  Discharge Durable Medical Equipment: No  Physical Therapy Consult: Yes  Occupational Therapy Consult: Yes  Speech Therapy Consult: Yes  Confirm Follow Up Transport: Family  Plan discussed with Pt/Family/Caregiver: Yes  Freedom of Choice Offered: Yes  Discharge Location  Discharge Placement: [address] [place], Care Manager  [phone]

## 2019-09-23 NOTE — PROGRESS NOTES
Speech path  Met with the pt and he has no speech deficits. Wife present and reports no new deficits. Educated the pt on the BEFAST. Educated pt that his risk factor for stroke of HTN. He reported he is taking his antihypertensive meds correctly. We will sign off.    Steven Devlin, SLP

## 2019-09-23 NOTE — DISCHARGE SUMMARY
Hospitalist Discharge Summary     Patient ID:  Guero Cabello  [id number]  61 y.o.  1960    PCP on record: Krysta Oropeza MD    Admit date: 9/21/2019  Discharge date and time: 9/23/2019      DISCHARGE DIAGNOSIS:    TIA      CONSULTATIONS:  IP CONSULT TO NEUROLOGY    Excerpted HPI from H&P of Cesar Saldaña MD:  Edinson Wood is a 61 y.o.  male  with past medical history of recently diagnosed hypertension, Crohn's disease, depression and fibromyalgia, recent sinus infection treated with antibiotics who presented to the ED for numbness of the left side  Of  his body which started today around 12 PM.  Patient reported intermittent numbness throughout his left side . He also reported dizziness but denies any focal deficit , HA or seizure like activity . symptoms resolved after 4 hours.  In the ED he was evaluated  by a tele neurologist, and he was found not to be a TPA candidate because of his stroke scale being 0.  He is vital signs were stable, his labs showed slightly elevated white blood cell count. His CT scan of the brain and CTA were negative.       ______________________________________________________________________  DISCHARGE SUMMARY/HOSPITAL COURSE:  for full details see H&P, daily progress notes, labs, consult notes. Left side numbness likely secondary to TIA POA   ECHO unremarkable ,otherTIA work up including MRI unremarkable  HbA1c, lipid profile unremarkable   Continue with lisinopril for hypertension  Continue aspirin and pravastatin when going home as per neurologist   Leucocytosis , 11.6 today , could be reactive or related to Recent sinus infection  Completed treatment already   Fibromyalgia  Depression/insomnia  Continue with Cymbalta, trazodone and Ambien  Tobacco abuse          _______________________________________________________________________  Patient seen and examined by me on discharge day.   Pertinent Findings:  Gen:    Not in distress  Chest: Clear lungs  CVS:   Regular rhythm. No edema  Abd:  Soft, not distended, not tender  Neuro:  Alert,oriented times 4   _______________________________________________________________________  DISCHARGE MEDICATIONS:   Current Discharge Medication List      START taking these medications    Details   aspirin 81 mg chewable tablet Take 1 Tab by mouth daily. Qty: 30 Tab, Refills: 0      pravastatin (PRAVACHOL) 40 mg tablet Take 1 Tab by mouth nightly. Qty: 30 Tab, Refills: 0         CONTINUE these medications which have NOT CHANGED    Details   DULoxetine (CYMBALTA) 60 mg capsule TAKE 1 CAPSULE BY MOUTH EVERY DAY  Qty: 90 Cap, Refills: 3    Associated Diagnoses: Fibromyalgia      lisinopril (PRINIVIL, ZESTRIL) 20 mg tablet Take 1 Tab by mouth daily. Qty: 90 Tab, Refills: 0    Associated Diagnoses: Essential hypertension      zolpidem CR (AMBIEN CR) 12.5 mg tablet TAKE 1 TABLET BY MOUTH AT BEDTIME  Refills: 3      Dexlansoprazole (DEXILANT) 60 mg CpDB Take  by mouth. diphenoxylate-atropine (LOMOTIL) 2.5-0.025 mg per tablet Take  by mouth four (4) times daily as needed for Diarrhea. traZODone (DESYREL) 50 mg tablet Take  by mouth nightly. STOP taking these medications       amoxicillin 500 mg tab Comments:   Reason for Stopping:         predniSONE (DELTASONE) 20 mg tablet Comments:   Reason for Stopping:               My Recommended Diet, Activity, Wound Care, and follow-up labs are listed in the patient's Discharge Insturctions which I have personally completed and reviewed.     ______________________________________________________________________    Risk of deterioration: Moderate    Condition at Discharge:  Stable  ______________________________________________________________________    Disposition  Home with family, no needs  ______________________________________    Care Plan discussed with:   Patient, Family, RN, Care Manager, Consultant    Comment: ______________________________________________________________________    Code Status: Full Code  ___

## 2019-09-23 NOTE — PROGRESS NOTES
Consult  REFERRED BY:  Rhina Hunter MD    CHIEF COMPLAINT: Numbness of the left side      Subjective:     Serena Quintero is a 61 y.o. left-handed  male seen at the request of Dr. Sveta Carlos for evaluation of new problem of left-sided numbness that came and went over about a 3-hour period yesterday with spells lasting 25 to 45 seconds of left-sided numbness involving his face arm and leg. Patient denies any chest pain or palpitations or any cardiac symptoms. The patient denied any weakness or coordination problems on the left side and denies any difficulty with cognition, speech, swallowing, or his gait. He did not have any precipitating cause for this. He never had a similar episode before. He is been placed on aspirin therapy and a statin, and is getting a complete neurovascular evaluation. His CTA of the head neck was normal, and CT of the head was normal.  His MRI scan and that was normal.  This is most likely just a TIA, not a stroke, but patient obviously had a focal neurologic deficit, and needs complete neurovascular evaluation to rule out any treatable cause of his symptoms. Patient is a high risk for further neurologic disability or deterioration if he has recurrent focal ischemic deficits or a stroke. His echocardiogram is pending, and if that is normal he can be discharged on aspirin and Pravachol. We can follow-up in stroke clinic in 2 to 4 weeks.       Past Medical History:   Diagnosis Date    Chronic insomnia 8/16/2018    Colon polyps 8/14/2018    Crohn's disease ([facility] [address] 8/14/2018    Depression 8/14/2018    Diverticulosis 8/14/2018    Fibromyalgia 8/14/2018    Gastroesophageal reflux disease without esophagitis 8/16/2018    IBS (irritable bowel syndrome) 8/14/2018    Onychomycosis 8/14/2018    Reactive arthritis ([facility] [address] 8/14/2018    Sarcoidosis 8/14/2018      Past Surgical History:   Procedure Laterality Date    HX ORCHIECTOMY Left     HX ORTHOPAEDIC      L3-5 lumbar surgery    HX TONSILLECTOMY       Family History   Problem Relation Age of Onset    Hypertension Mother     Hypertension Father     Prostate Cancer Father     Stroke Father     Hypertension Brother     Hypertension Paternal Grandfather     Colon Cancer Paternal Grandfather     Colon Cancer Paternal Uncle     No Known Problems Child       Social History     Tobacco Use    Smoking status: Current Every Day Smoker     Packs/day: 1.50    Smokeless tobacco: Never Used   Substance Use Topics    Alcohol use: No         Current Facility-Administered Medications:     influenza vaccine 2019-20 (6 mos+)(PF) (FLUARIX/FLULAVAL/FLUZONE QUAD) injection 0.5 mL, 0.5 mL, IntraMUSCular, PRIOR TO DISCHARGE, Vinh Perkins MD    lisinopril (PRINIVIL, ZESTRIL) tablet 20 mg, 20 mg, Oral, DAILY, Abhay Urban MD, 20 mg at 09/23/19 0913    DULoxetine (CYMBALTA) capsule 60 mg, 60 mg, Oral, DAILY, Abhay Urban MD, 60 mg at 09/23/19 0913    traZODone (DESYREL) tablet 50 mg, 50 mg, Oral, QHS, Abhay Urban MD, 50 mg at 09/22/19 2108    pantoprazole (PROTONIX) tablet 40 mg, 40 mg, Oral, DAILY, Abhay Urban MD, 40 mg at 09/23/19 0913    sodium chloride (NS) flush 5-40 mL, 5-40 mL, IntraVENous, Q8H, Abhay Urban MD, Stopped at 09/23/19 1400    sodium chloride (NS) flush 5-40 mL, 5-40 mL, IntraVENous, PRN, Abhay Urban MD, 10 mL at 09/23/19 0307    acetaminophen (TYLENOL) tablet 650 mg, 650 mg, Oral, Q4H PRN **OR** acetaminophen (TYLENOL) solution 650 mg, 650 mg, Per NG tube, Q4H PRN **OR** acetaminophen (TYLENOL) suppository 650 mg, 650 mg, Rectal, Q4H PRN, Abhay Urban MD    pravastatin (PRAVACHOL) tablet 40 mg, 40 mg, Oral, QHS, Abhay Urban MD, 40 mg at 09/22/19 2108    labetalol (NORMODYNE;TRANDATE) injection 20 mg, 20 mg, IntraVENous, Q4H PRN, Abhay Urban MD    enoxaparin (LOVENOX) injection 40 mg, 40 mg, SubCUTAneous, Q24H, Abhay Urban MD, 40 mg at 09/22/19 1943   zolpidem (AMBIEN) tablet 10 mg, 10 mg, Oral, QHS, Edson Chen MD, 10 mg at 09/22/19 2108    diphenoxylate-atropine (LOMOTIL) tablet 1 Tab, 1 Tab, Oral, QID PRN, Edson Chen MD    predniSONE (DELTASONE) tablet 40 mg, 40 mg, Oral, DAILY WITH BREAKFAST, Edson Chen MD, 40 mg at 09/23/19 0913    amoxicillin (AMOXIL) capsule 500 mg, 500 mg, Oral, BID, Edson Chen MD, 500 mg at 09/23/19 3616    aspirin chewable tablet 81 mg, 81 mg, Oral, DAILY, Edson Chen MD, 81 mg at 09/23/19 0913    nicotine (NICODERM CQ) 14 mg/24 hr patch 1 Patch, 1 Patch, TransDERmal, DAILY, Chanel Bowman MD, 1 Patch at 09/23/19 9647        Allergies   Allergen Reactions    Remicade [Infliximab] Other (comments)      MRI Results (most recent):  Results from [place] encounter on 09/21/19   MRI BRAIN WO CONT    Narrative EXAM:  MRI BRAIN WO CONT  INDICATION:  Workup for TIA, patient presented to the ED with acute onset  multiple episodes left-sided numbness on the day of arrival lasting short  periods of time. Upon arrival symptoms had resolved. TECHNIQUE: Sagittal T1, axial FLAIR, T2,T1 and gradient echo images as well as  coronal T2 weighted images and axial diffusion weighted images of the head were  obtained. COMPARISON:  CT, CTA. FINDINGS:  The ventricular size and configuration are normal.   Scattered tiny punctate foci of T2 hyperintensity in the cerebral white matter  without associated restricted diffusion. Nonspecific pattern and possibly  chronic. Otherwise normal signal in the brain. No evidence of hemorrhage, mass, infarct or abnormal extra-axial fluid  collections. Flow voids are present in the vertebral basilar and carotid artery systems. The craniocervical junction is unremarkable. Mild mucosal thickening paranasal sinuses. Impression IMPRESSION: No acute intracranial abnormality demonstrated.          Results from [place] encounter on 09/21/19   MRI BRAIN WO CONT Narrative EXAM:  MRI BRAIN WO CONT  INDICATION:  Workup for TIA, patient presented to the ED with acute onset  multiple episodes left-sided numbness on the day of arrival lasting short  periods of time. Upon arrival symptoms had resolved. TECHNIQUE: Sagittal T1, axial FLAIR, T2,T1 and gradient echo images as well as  coronal T2 weighted images and axial diffusion weighted images of the head were  obtained. COMPARISON:  CT, CTA. FINDINGS:  The ventricular size and configuration are normal.   Scattered tiny punctate foci of T2 hyperintensity in the cerebral white matter  without associated restricted diffusion. Nonspecific pattern and possibly  chronic. Otherwise normal signal in the brain. No evidence of hemorrhage, mass, infarct or abnormal extra-axial fluid  collections. Flow voids are present in the vertebral basilar and carotid artery systems. The craniocervical junction is unremarkable. Mild mucosal thickening paranasal sinuses. Impression IMPRESSION: No acute intracranial abnormality demonstrated. Review of Systems:  A comprehensive review of systems was negative except for: Neurological: positive for paresthesia   Vitals:    09/23/19 0330 09/23/19 0808 09/23/19 1148 09/23/19 1249   BP: 129/89 (!) 145/96 (!) 138/92 (!) 145/96   Pulse: 79 74 84    Resp: 18 16 16    Temp: 97 °F (36.1 °C) 97.6 °F (36.4 °C) 97.4 °F (36.3 °C)    SpO2: 95% 97% 99%    Weight:    215 lb (97.5 kg)   Height:    6' (1.829 m)     Objective:     I      NEUROLOGICAL EXAM:    Appearance: The patient is well developed, well nourished, provides a coherent history and is in no acute distress. Mental Status: Oriented to time, place and person, and the president, cognitive function is normal and speech is fluent and no aphasia or dysarthria. Mood and affect appropriate. Cranial Nerves:   Intact visual fields. Fundi are benign, disc are flat, no lesions seen on funduscopy. SHIVAM, EOM's full, no nystagmus, no ptosis. Facial sensation is normal. Corneal reflexes are not tested. Facial movement is symmetric. Hearing is normal bilaterally. Palate is midline with normal sternocleidomastoid and trapezius muscles are normal. Tongue is midline. Neck without meningismus or bruits  Temporal arteries are not tender or enlarged  TMJ areas are not tender on palpation   Motor:  5/5 strength in upper and lower proximal and distal muscles. Normal bulk and tone. No fasciculations. Rapid alternating movement is symmetric and intact bilaterally   Reflexes:   Deep tendon reflexes 2+/4 and symmetrical.  No babinski or clonus present   Sensory:   Normal to touch, pinprick and vibration and temperature. DSS is intact   Gait:  Normal gait for patient's age. Tremor:   No tremor noted. Cerebellar:  No abnormal cerebellar signs present on Romberg and tandem testing and finger-nose-finger exam.   Neurovascular:  Normal heart sounds and regular rhythm, peripheral pulses intact, and no carotid bruits. Assessment:       ICD-10-CM ICD-9-CM    1. Stroke-like symptoms R29.90 781.99    2. Bilateral carotid artery stenosis I65.23 433.10      433.30    3. TIA (transient ischemic attack) G45.9 435.9    4. Transient ischemic attack involving right internal carotid artery G45.1 435.8      Active Problems:    TIA (transient ischemic attack) (9/21/2019)      Transient ischemic attack involving right internal carotid artery (9/22/2019)      Bilateral carotid artery stenosis (9/22/2019)        Plan:     Lisa Amin is a 61 y.o. left-handed  male seen at the request of Dr. Niurka Carmona for evaluation of new problem of left-sided numbness that came and went over about a 3-hour period yesterday with spells lasting 25 to 45 seconds of left-sided numbness involving his face arm and leg. Patient denies any chest pain or palpitations or any cardiac symptoms.   The patient denied any weakness or coordination problems on the left side and denies any difficulty with cognition, speech, swallowing, or his gait. He did not have any precipitating cause for this. He never had a similar episode before. He is been placed on aspirin therapy and a statin, and is getting a complete neurovascular evaluation. His CTA of the head neck was normal, and CT of the head was normal.  His MRI scan and that was normal.  This is most likely just a TIA, not a stroke, but patient obviously had a focal neurologic deficit, and needs complete neurovascular evaluation to rule out any treatable cause of his symptoms. Patient is a high risk for further neurologic disability or deterioration if he has recurrent focal ischemic deficits or a stroke. Karen this condition with the patient and his wife in detail, and also the hospitalist.  His echocardiogram is pending, and if that is normal he can be discharged on aspirin and Pravachol. We can follow-up in stroke clinic in 2 to 4 weeks. Signed By: Marisol Hernández MD     September 23, 2019       CC: Brittanie Oakes MD  FAX: [phone]    This note will not be viewable in [address]

## 2019-09-23 NOTE — PROGRESS NOTES
Problem: Falls - Risk of  Goal: *Absence of Falls  Description  Document [facility] Fall Risk and appropriate interventions in the flowsheet.   Outcome: Progressing Towards Goal  Note:   Fall Risk Interventions:            Medication Interventions: Evaluate medications/consider consulting pharmacy                   Problem: Patient Education: Go to Patient Education Activity  Goal: Patient/Family Education  Outcome: Progressing Towards Goal     Problem: Patient Education: Go to Patient Education Activity  Goal: Patient/Family Education  Outcome: Progressing Towards Goal     Problem: TIA/CVA Stroke: 0-24 hours  Goal: Off Pathway (Use only if patient is Off Pathway)  Outcome: Progressing Towards Goal  Goal: Activity/Safety  Outcome: Progressing Towards Goal  Goal: Consults, if ordered  Outcome: Progressing Towards Goal  Goal: Diagnostic Test/Procedures  Outcome: Progressing Towards Goal  Goal: Nutrition/Diet  Outcome: Progressing Towards Goal  Goal: Discharge Planning  Outcome: Progressing Towards Goal  Goal: Medications  Outcome: Progressing Towards Goal  Goal: Respiratory  Outcome: Progressing Towards Goal  Goal: Treatments/Interventions/Procedures  Outcome: Progressing Towards Goal  Goal: Minimize risk of bleeding post-thrombolytic infusion  Outcome: Progressing Towards Goal  Goal: Monitor for complications post-thrombolytic infusion  Outcome: Progressing Towards Goal  Goal: Psychosocial  Outcome: Progressing Towards Goal  Goal: *Hemodynamically stable  Outcome: Progressing Towards Goal  Goal: *Neurologically stable  Description  Absence of additional neurological deficits    Outcome: Progressing Towards Goal  Goal: *Verbalizes anxiety and depression are reduced or absent  Outcome: Progressing Towards Goal  Goal: *Absence of Signs of Aspiration on Current Diet  Outcome: Progressing Towards Goal  Goal: *Absence of deep venous thrombosis signs and symptoms(Stroke Metric)  Outcome: Progressing Towards Goal  Goal: *Ability to perform ADLs and demonstrates progressive mobility and function  Outcome: Progressing Towards Goal  Goal: *Stroke education started(Stroke Metric)  Outcome: Progressing Towards Goal  Goal: *Dysphagia screen performed(Stroke Metric)  Outcome: Progressing Towards Goal  Goal: *Rehab consulted(Stroke Metric)  Outcome: Progressing Towards Goal     Problem: TIA/CVA Stroke: Day 2 Until Discharge  Goal: Off Pathway (Use only if patient is Off Pathway)  Outcome: Progressing Towards Goal  Goal: Activity/Safety  Outcome: Progressing Towards Goal  Goal: Diagnostic Test/Procedures  Outcome: Progressing Towards Goal  Goal: Nutrition/Diet  Outcome: Progressing Towards Goal  Goal: Discharge Planning  Outcome: Progressing Towards Goal  Goal: Medications  Outcome: Progressing Towards Goal  Goal: Respiratory  Outcome: Progressing Towards Goal  Goal: Treatments/Interventions/Procedures  Outcome: Progressing Towards Goal  Goal: Psychosocial  Outcome: Progressing Towards Goal  Goal: *Verbalizes anxiety and depression are reduced or absent  Outcome: Progressing Towards Goal  Goal: *Absence of aspiration  Outcome: Progressing Towards Goal  Goal: *Absence of deep venous thrombosis signs and symptoms(Stroke Metric)  Outcome: Progressing Towards Goal  Goal: *Optimal pain control at patient's stated goal  Outcome: Progressing Towards Goal  Goal: *Tolerating diet  Outcome: Progressing Towards Goal  Goal: *Ability to perform ADLs and demonstrates progressive mobility and function  Outcome: Progressing Towards Goal  Goal: *Stroke education continued(Stroke Metric)  Outcome: Progressing Towards Goal     Problem: Ischemic Stroke: Discharge Outcomes  Goal: *Verbalizes anxiety and depression are reduced or absent  Outcome: Progressing Towards Goal  Goal: *Verbalize understanding of risk factor modification(Stroke Metric)  Outcome: Progressing Towards Goal  Goal: *Hemodynamically stable  Outcome: Progressing Towards Goal  Goal: *Absence of aspiration pneumonia  Outcome: Progressing Towards Goal  Goal: *Aware of needed dietary changes  Outcome: Progressing Towards Goal  Goal: *Verbalize understanding of prescribed medications including anti-coagulants, anti-lipid, and/or anti-platelets(Stroke Metric)  Outcome: Progressing Towards Goal  Goal: *Tolerating diet  Outcome: Progressing Towards Goal  Goal: *Aware of follow-up diagnostics related to anticoagulants  Outcome: Progressing Towards Goal  Goal: *Ability to perform ADLs and demonstrates progressive mobility and function  Outcome: Progressing Towards Goal  Goal: *Absence of DVT(Stroke Metric)  Outcome: Progressing Towards Goal  Goal: *Absence of aspiration  Outcome: Progressing Towards Goal  Goal: *Optimal pain control at patient's stated goal  Outcome: Progressing Towards Goal  Goal: *Home safety concerns addressed  Outcome: Progressing Towards Goal  Goal: *Describes available resources and support systems  Outcome: Progressing Towards Goal  Goal: *Verbalizes understanding of activation of EMS(911) for stroke symptoms(Stroke Metric)  Outcome: Progressing Towards Goal  Goal: *Understands and describes signs and symptoms to report to providers(Stroke Metric)  Outcome: Progressing Towards Goal  Goal: *Neurolgocially stable (absence of additional neurological deficits)  Outcome: Progressing Towards Goal  Goal: *Verbalizes importance of follow-up with primary care physician(Stroke Metric)  Outcome: Progressing Towards Goal  Goal: *Smoking cessation discussed,if applicable(Stroke Metric)  Outcome: Progressing Towards Goal  Goal: *Depression screening completed(Stroke Metric)  Outcome: Progressing Towards Goal

## 2019-09-23 NOTE — ROUTINE PROCESS
Bedside and Verbal shift change report given  to Harriet Jessica RN (oncoming nurse) by Ros(offgoing nurse). Report included the following information SBAR, Kardex, Intake/Output and MAR. Zone Phone:   [phone] Significant changes during shift:   
 
 
 
Patient Information Rosita Kawasaki. 
61 y.o. 
9/21/2019  3:50 PM by Nanci Wakefield MD. Rosita Kawasaki. was admitted from Home 
 
Problem List 
 
Patient Active Problem List  
 Diagnosis Date Noted  Essential hypertension 07/08/2019 Priority: 1 - One  Gastroesophageal reflux disease without esophagitis 08/16/2018 Priority: 1 - One  Chronic insomnia 08/16/2018 Priority: 1 - One  Reactive arthritis ([address] 08/14/2018 Priority: 1 - One  Colon polyps 08/14/2018 Priority: 1 - One  Crohn's disease ([address] 08/14/2018 Priority: 1 - One  Diverticulosis 08/14/2018 Priority: 1 - One  
 Fibromyalgia 08/14/2018 Priority: 1 - One  
 IBS (irritable bowel syndrome) 08/14/2018 Priority: 1 - One  
 Onychomycosis 08/14/2018 Priority: 1 - One  Sarcoidosis 08/14/2018 Priority: 1 - One  Transient ischemic attack involving right internal carotid artery 09/22/2019  Bilateral carotid artery stenosis 09/22/2019  TIA (transient ischemic attack) 09/21/2019  Depression, major, recurrent, mild ([address] 08/20/2019  Tobacco dependence 08/14/2018 Past Medical History:  
Diagnosis Date  Chronic insomnia 8/16/2018  Colon polyps 8/14/2018  Crohn's disease ([address] 8/14/2018  Depression 8/14/2018  Diverticulosis 8/14/2018  Fibromyalgia 8/14/2018  Gastroesophageal reflux disease without esophagitis 8/16/2018  IBS (irritable bowel syndrome) 8/14/2018  Onychomycosis 8/14/2018  Reactive arthritis ([address] 8/14/2018  Sarcoidosis 8/14/2018 Core Measures: CVA: Yes Yes Activity Status: OOB to Chair Yes Ambulated this shift Yes DVT prophylaxis: DVT prophylaxis Med- Yes DVT prophylaxis SCD or KRISTY- No  
 
Wounds: (If Applicable) Wounds- No 
 
Patient Safety: 
 
Falls Score Total Score: 0 Safety Level_______ Bed Alarm On? No 
Sitter? No 
 
Plan for upcoming shift: safety, MRI, neuro checks Discharge Plan: Yes when all test/consults completed Active Consults: 
IP CONSULT TO NEUROLOGY

## 2019-09-25 LAB
ATRIAL RATE: 71 BPM
CALCULATED P AXIS, ECG09: 49 DEGREES
CALCULATED R AXIS, ECG10: 63 DEGREES
CALCULATED T AXIS, ECG11: 75 DEGREES
DIAGNOSIS, 93000: NORMAL
P-R INTERVAL, ECG05: 146 MS
Q-T INTERVAL, ECG07: 412 MS
QRS DURATION, ECG06: 92 MS
QTC CALCULATION (BEZET), ECG08: 447 MS
VENTRICULAR RATE, ECG03: 71 BPM

## 2019-09-30 ENCOUNTER — OFFICE VISIT (OUTPATIENT)
Dept: INTERNAL MEDICINE CLINIC | Age: 59
End: 2019-09-30

## 2019-09-30 VITALS
DIASTOLIC BLOOD PRESSURE: 72 MMHG | RESPIRATION RATE: 18 BRPM | OXYGEN SATURATION: 97 % | WEIGHT: 198 LBS | HEART RATE: 97 BPM | BODY MASS INDEX: 26.82 KG/M2 | HEIGHT: 72 IN | TEMPERATURE: 98 F | SYSTOLIC BLOOD PRESSURE: 122 MMHG

## 2019-09-30 DIAGNOSIS — I10 ESSENTIAL HYPERTENSION: Chronic | ICD-10-CM

## 2019-09-30 DIAGNOSIS — F17.200 TOBACCO DEPENDENCE: ICD-10-CM

## 2019-09-30 DIAGNOSIS — G45.9 TIA (TRANSIENT ISCHEMIC ATTACK): Primary | ICD-10-CM

## 2019-09-30 DIAGNOSIS — Z79.899 LONG-TERM USE OF HIGH-RISK MEDICATION: Chronic | ICD-10-CM

## 2019-09-30 DIAGNOSIS — E78.00 HYPERCHOLESTEROLEMIA: Chronic | ICD-10-CM

## 2019-09-30 NOTE — PROGRESS NOTES
Bhavana Barney. is a 61 y.o. male presenting for Transitions Of Care and Knee Pain (L)  . 1. Have you been to the ER, urgent care clinic since your last visit? Hospitalized since your last visit? Admit date: 9/21/2019  Discharge date and time: 9/23/2019        DISCHARGE DIAGNOSIS:     TIA    2. Have you seen or consulted any other health care providers outside of the [address] [place] [address] since your last visit? Include any pap smears or colon screening. No    No flowsheet data found. Abuse Screening Questionnaire 7/8/2019   Do you ever feel afraid of your partner? N   Are you in a relationship with someone who physically or mentally threatens you? N   Is it safe for you to go home? Y       3 most recent PHQ Screens 7/8/2019   Little interest or pleasure in doing things Not at all   Feeling down, depressed, irritable, or hopeless Not at all   Total Score PHQ 2 0   Trouble falling or staying asleep, or sleeping too much Several days   Feeling tired or having little energy More than half the days   Poor appetite, weight loss, or overeating Several days   Feeling bad about yourself - or that you are a failure or have let yourself or your family down Not at all   Trouble concentrating on things such as school, work, reading, or watching TV Several days   Moving or speaking so slowly that other people could have noticed; or the opposite being so fidgety that others notice Several days   Thoughts of being better off dead, or hurting yourself in some way Not at all   PHQ 9 Score 6   How difficult have these problems made it for you to do your work, take care of your home and get along with others Somewhat difficult       There are no discontinued medications.

## 2019-09-30 NOTE — PROGRESS NOTES
This note will not be viewable in [address] Kimber Cordero. is a 61 y.o. male and presents with Transitions Of Care and Knee Pain (L)  . Subjective:  Debbie Luong returns to the office today and transition of care subsequent to a hospitalization from 9/21 until 9/23 when he was admitted with a TIA manifested by left sided numbness involving the entire left side of his body. The patient noted no motor deficit or seizure activity. The patient had been taking a baby aspirin daily along with his blood pressure medication. The patient was admitted and continued on aspirin. A CT of the brain was negative and a CTA of the brain was likewise negative. MRI was unremarkable. Echocardiogram was normal.  Hemoglobin A1c was normal.  His symptoms resolved and he was seen by. Pravastatin was added to his regimen. Patient has been doing well since that time although he is felt more fatigued. He has had no recurrent symptoms. Past Medical History:   Diagnosis Date    Chronic insomnia 8/16/2018    Colon polyps 8/14/2018    Crohn's disease ([facility] 8/14/2018    Depression 8/14/2018    Diverticulosis 8/14/2018    Fibromyalgia 8/14/2018    Gastroesophageal reflux disease without esophagitis 8/16/2018    IBS (irritable bowel syndrome) 8/14/2018    Onychomycosis 8/14/2018    Reactive arthritis ([facility] 8/14/2018    Sarcoidosis 8/14/2018     Past Surgical History:   Procedure Laterality Date    HX ORCHIECTOMY Left     HX ORTHOPAEDIC      L3-5 lumbar surgery    HX TONSILLECTOMY       Allergies   Allergen Reactions    Remicade [Infliximab] Other (comments)     Current Outpatient Medications   Medication Sig Dispense Refill    aspirin 81 mg chewable tablet Take 1 Tab by mouth daily. 30 Tab 0    pravastatin (PRAVACHOL) 40 mg tablet Take 1 Tab by mouth nightly.  30 Tab 0    DULoxetine (CYMBALTA) 60 mg capsule TAKE 1 CAPSULE BY MOUTH EVERY DAY 90 Cap 3    lisinopril (PRINIVIL, ZESTRIL) 20 mg tablet Take 1 Tab by mouth daily. 90 Tab 0    zolpidem CR (AMBIEN CR) 12.5 mg tablet TAKE 1 TABLET BY MOUTH AT BEDTIME  3    Dexlansoprazole (DEXILANT) 60 mg CpDB Take  by mouth daily.  diphenoxylate-atropine (LOMOTIL) 2.5-0.025 mg per tablet Take  by mouth four (4) times daily as needed for Diarrhea.  traZODone (DESYREL) 50 mg tablet Take  by mouth nightly.        Social History     Socioeconomic History    Marital status:      Spouse name: Not on file    Number of children: 2    Years of education: Not on file    Highest education level: Not on file   Occupational History    Occupation: refinishes furniture   Tobacco Use    Smoking status: Current Every Day Smoker     Packs/day: 1.50    Smokeless tobacco: Never Used   Substance and Sexual Activity    Alcohol use: No    Drug use: Never     Family History   Problem Relation Age of Onset    Hypertension Mother     Hypertension Father     Prostate Cancer Father     Stroke Father     Hypertension Brother     Hypertension Paternal Grandfather     Colon Cancer Paternal Grandfather     Colon Cancer Paternal Uncle     No Known Problems Child        Health Maintenance   Topic Date Due    Pneumococcal 0-64 years (1 of 1 - PPSV23) 06/19/1966    Shingrix Vaccine Age 50> (1 of 2) 06/19/2010    COLONOSCOPY  07/20/2020    DTaP/Tdap/Td series (2 - Td) 08/16/2028    Hepatitis C Screening  Completed    Influenza Age 5 to Adult  Completed        Review of Systems  Constitutional: negative for fevers, chills, anorexia and weight loss  Eyes:   negative for visual disturbance and irritation  ENT:   negative for tinnitus,sore throat,nasal congestion,ear pain,hoarseness  Respiratory:  negative for cough, hemoptysis, dyspnea,wheezing  CV:   negative for chest pain, palpitations, lower extremity edema  GI:   negative for nausea, vomiting, diarrhea, abdominal pain,melena  Endo:               negative for polyuria,polydipsia,polyphagia,heat intolerance  Genitourinary: negative for frequency, dysuria and hematuria  Integumentary: negative for rash and pruritus  Hematologic:  negative for easy bruising and gum/nose bleeding  Musculoskel: negative for myalgias, arthralgias, back pain, muscle weakness, joint pain  Neurological:  negative for headaches, dizziness, vertigo, memory problems and gait   Behavl/Psych: negative for feelings of anxiety, depression, mood changes  ROS otherwise negative      Objective:  Visit Vitals  /72 (BP 1 Location: Right arm, BP Patient Position: Sitting)   Pulse 97   Temp 98 °F (36.7 °C) (Oral)   Resp 18   Ht 6' (1.829 m)   Wt 198 lb (89.8 kg)   SpO2 97%   BMI 26.85 kg/m²     Body mass index is 26.85 kg/m². Physical Exam:   General appearance - alert, well appearing, and in no distress  Mental status - alert, oriented to person, place, and time  EYE-SHIVAM, EOMI,conjunctiva normal bilaterally, lids normal  ENT-ENT exam normal, no neck nodes or sinus tenderness  Nose - normal and patent, no erythema,  Or discharge   Mouth - mucous membranes moist, pharynx normal without lesions  Neck - supple, no significant adenopathy or bruit  Chest - clear to auscultation, no wheezes, rales or rhonchi. Heart - normal rate, regular rhythm, normal S1, S2, no murmurs, rubs, clicks or gallops   Abdomen - soft, nontender, nondistended, no masses or organomegaly  Lymph- no adenopathy palpable  Ext-peripheral pulses normal, no pedal edema, no clubbing or cyanosis  Skin-Warm and dry. no hyperpigmentation, vitiligo, or suspicious lesions  Neuro -alert, oriented, normal speech, no focal findings or movement disorder noted      Assessment/Plan:  Diagnoses and all orders for this visit:    TIA (transient ischemic attack)    Essential hypertension    Tobacco dependence    Hypercholesterolemia    Long-term use of high-risk medication        Other instructions: The patient's medications were reviewed and reconciled. No change in his current medical regimen is made.     Medical records from 9/21 until 9/23 were reviewed including progress reports, laboratory studies, cardiac studies, imaging studies, consultation notes. The patient is aware that he is not to drive for 3 months    Patient will be arranging for neurological follow-up with Dr. Denisa Smith in the near future    Follow-up here as previously scheduled    Follow-up and Dispositions    · Return for As previously scheduled. I have reviewed with the patient details of the assessment and plan and all questions were answered. Relevent patient education was performed. The most recent lab findings were reviewed with the patient. An After Visit Summary was printed and given to the patient.     Kathi Moreno MD

## 2019-09-30 NOTE — PATIENT INSTRUCTIONS
Transient Ischemic Attack: Care Instructions Your Care Instructions A transient ischemic attack (TIA) is when blood flow to a part of your brain is blocked for a short time. A TIA is like a stroke but usually lasts only a few minutes. A TIA does not cause lasting brain damage. Any vision problems, slurred speech, or other symptoms usually go away in 10 to 20 minutes. But they may last for up to 24 hours. TIAs are often warning signs of a stroke. Some people who have a TIA may have a stroke in the future. A stroke can cause symptoms like those of a TIA. But a stroke causes lasting damage to your brain. You can take steps to help prevent a stroke. One thing you can do is get early treatment. If you have other new symptoms, or if your symptoms do not get better, go back to the emergency room or call your doctor right away. Getting treatment right away may prevent long-term brain damage caused by a stroke. The doctor has checked you carefully, but problems can develop later. If you notice any problems or new symptoms, get medical treatment right away. Follow-up care is a key part of your treatment and safety. Be sure to make and go to all appointments, and call your doctor if you are having problems. It's also a good idea to know your test results and keep a list of the medicines you take. How can you care for yourself at home? Medicines 
  · Be safe with medicines. Take your medicines exactly as prescribed. Call your doctor if you think you are having a problem with your medicine.  
  · If you take a blood thinner, such as aspirin, be sure you get instructions about how to take your medicine safely.  Blood thinners can cause serious bleeding problems.  
  · Call your doctor if you are not able to take your medicines for any reason.  
  · Do not take any over-the-counter medicines or herbal products without talking to your doctor first.  
  · If you take birth control pills or hormone therapy, talk to your doctor. Ask if these treatments are right for you.  
 Lifestyle changes 
  · Do not smoke. If you need help quitting, talk to your doctor about stop-smoking programs and medicines.  
  · Be active. If your doctor recommends it, get more exercise. Walking is a good choice. Bit by bit, increase the amount you walk every day. Try for at least 30 minutes on most days of the week. You also may want to swim, bike, or do other activities.  
  · Eat heart-healthy foods. These include fruits, vegetables, high-fiber foods, fish, and foods that are low in sodium, saturated fat, and trans fat.  
  · Stay at a healthy weight. Lose weight if you need to.  
  · Limit alcohol to 2 drinks a day for men and 1 drink a day for women.  
 Staying healthy 
  · Manage other health problems such as diabetes, high blood pressure, and high cholesterol.  
  · Get the flu vaccine every year. When should you call for help? Call 911 anytime you think you may need emergency care. For example, call if: 
  · You have new or worse symptoms of a stroke. These may include: 
? Sudden numbness, tingling, weakness, or loss of movement in your face, arm, or leg, especially on only one side of your body. ? Sudden vision changes. ? Sudden trouble speaking. ? Sudden confusion or trouble understanding simple statements. ? Sudden problems with walking or balance. ? A sudden, severe headache that is different from past headaches. Call 911 even if these symptoms go away in a few minutes.  
  · You feel like you are having another TIA.  
 Watch closely for changes in your health, and be sure to contact your doctor if you have any problems. Where can you learn more? Go to http://mane-evelio.info/. Enter [phone] in the search box to learn more about \"Transient Ischemic Attack: Care Instructions. \" Current as of: September 26, 2018 Content Version: 12.2 © 5859-8261 [employer].  Care instructions adapted under license by [address] (which disclaims liability or warranty for this information). If you have questions about a medical condition or this instruction, always ask your healthcare professional. [address] any warranty or liability for your use of this information.

## 2019-10-18 RX ORDER — PRAVASTATIN SODIUM 40 MG/1
40 TABLET ORAL
Qty: 30 TAB | Refills: 0 | Status: SHIPPED | OUTPATIENT
Start: 2019-10-18 | End: 2019-11-18 | Stop reason: SDUPTHER

## 2019-10-18 RX ORDER — GUAIFENESIN 100 MG/5ML
81 LIQUID (ML) ORAL DAILY
Qty: 30 TAB | Refills: 0 | Status: SHIPPED | OUTPATIENT
Start: 2019-10-18 | End: 2019-11-18 | Stop reason: SDUPTHER

## 2019-10-18 NOTE — TELEPHONE ENCOUNTER
Requested Prescriptions     Pending Prescriptions Disp Refills    aspirin 81 mg chewable tablet 30 Tab 0     Sig: Take 1 Tab by mouth daily.  pravastatin (PRAVACHOL) 40 mg tablet 30 Tab 0     Sig: Take 1 Tab by mouth nightly.        Last Refill: aspirin 9/24/19 / pravastatin 9/23/19 hospital  Next Appointment:2/26/20

## 2019-12-12 ENCOUNTER — OFFICE VISIT (OUTPATIENT)
Dept: NEUROLOGY | Age: 59
End: 2019-12-12

## 2019-12-12 VITALS
BODY MASS INDEX: 27.36 KG/M2 | OXYGEN SATURATION: 68 % | SYSTOLIC BLOOD PRESSURE: 150 MMHG | HEIGHT: 72 IN | WEIGHT: 202 LBS | DIASTOLIC BLOOD PRESSURE: 96 MMHG | RESPIRATION RATE: 16 BRPM | HEART RATE: 68 BPM

## 2019-12-12 DIAGNOSIS — G45.1 TRANSIENT ISCHEMIC ATTACK INVOLVING RIGHT INTERNAL CAROTID ARTERY: ICD-10-CM

## 2019-12-12 DIAGNOSIS — G45.9 TIA (TRANSIENT ISCHEMIC ATTACK): ICD-10-CM

## 2019-12-12 DIAGNOSIS — M79.7 FIBROMYALGIA: Primary | ICD-10-CM

## 2019-12-12 DIAGNOSIS — I65.23 BILATERAL CAROTID ARTERY STENOSIS: ICD-10-CM

## 2019-12-12 NOTE — LETTER
12/12/19 Patient: Lata Ruiz. YOB: 1960 Date of Visit: 12/12/2019 MD [address] [id number] VIA In Basket Dear Kingston Mason MD, Thank you for referring Mr. Cherelle Melendez to [facility] for evaluation. My notes for this consultation are attached. Consult REFERRED BY: 
Lyudmila Nielson MD 
 
CHIEF COMPLAINT: Numbness of the left side Subjective:  
 
Lata Caballero is a 61 y.o. left-handed  male seen at the request of Dr. Nicole Prees for evaluation of new problem of elevated blood pressure, with his blood pressure being 176/96 initially, but then when repeated was down to 150/96, and patient advised to keep track of his blood pressure at home, and contact Dr. Nicole Peres if it remains consistently over 140/90. He was advised that this is the guidelines, and the new guidelines are 130/80 he should aim for that eventually. He is taking Zestril 20 mg a day for his blood pressure. He states that home and has been doing better. He is seen for follow-up of his stroke that occurred September 2019 when he had left-sided numbness that came and went over about a 3-hour period yesterday with spells lasting 25 to 45 seconds of left-sided numbness involving his face arm and leg. Patient denies any chest pain or palpitations or any cardiac symptoms. The patient denied any weakness or coordination problems on the left side and denies any difficulty with cognition, speech, swallowing, or his gait. He did not have any precipitating cause for this. He never had a similar episode before. He is been placed on aspirin therapy and a statin, and is getting a complete neurovascular evaluation.   His CTA of the head neck was normal, and CT of the head was normal.  His MRI scan and that was normal.  This is most likely just a TIA, not a stroke, but patient obviously had a focal neurologic deficit, and needs complete neurovascular evaluation to rule out any treatable cause of his symptoms. Patient is a high risk for further neurologic disability or deterioration if he has recurrent focal ischemic deficits or a stroke. His echocardiogram was okay, and he is currently on 81 mg of aspirin and 40 mg of Pravachol for stroke prevention. He has had no recurrent strokelike symptoms in the interim and done well medically except for having recent upper respiratory infection and bronchitis and on antibiotics for that. He still smokes and is going to try to get off the cigarettes. He knows that is the main risk factor of his stroke as his hypertension which he is working on with his PCP and his cholesterol seems to be better, and is not diabetic and can try to watch his diet. He is advised of the warning signs of stroke as far as be fast, as balance, eyes, face, arms, sensory, time Past Medical History:  
Diagnosis Date  Chronic insomnia 8/16/2018  Colon polyps 8/14/2018  Crohn's disease ([address] 8/14/2018  Depression 8/14/2018  Diverticulosis 8/14/2018  Fibromyalgia 8/14/2018  Gastroesophageal reflux disease without esophagitis 8/16/2018  IBS (irritable bowel syndrome) 8/14/2018  Onychomycosis 8/14/2018  Reactive arthritis ([address] 8/14/2018  Sarcoidosis 8/14/2018 Past Surgical History:  
Procedure Laterality Date  HX ORCHIECTOMY Left  HX ORTHOPAEDIC    
 L3-5 lumbar surgery  HX TONSILLECTOMY Family History Problem Relation Age of Onset  Hypertension Mother  Hypertension Father  Prostate Cancer Father  Stroke Father  Hypertension Brother  Hypertension Paternal Grandfather  Colon Cancer Paternal Grandfather  Colon Cancer Paternal Uncle  No Known Problems Child Social History Tobacco Use  Smoking status: Current Every Day Smoker Packs/day: 1.50  Smokeless tobacco: Never Used Substance Use Topics  Alcohol use: No  
   
 
Current Outpatient Medications:  
  aspirin 81 mg chewable tablet, TAKE 1 TABLET BY MOUTH EVERY DAY, Disp: 30 Tab, Rfl: 5 
  pravastatin (PRAVACHOL) 40 mg tablet, TAKE 1 TABLET BY MOUTH EVERY DAY AT NIGHT, Disp: 30 Tab, Rfl: 5 
  lisinopril (PRINIVIL, ZESTRIL) 20 mg tablet, TAKE 1 TABLET BY MOUTH EVERY DAY, Disp: 90 Tab, Rfl: 1   DULoxetine (CYMBALTA) 60 mg capsule, TAKE 1 CAPSULE BY MOUTH EVERY DAY, Disp: 90 Cap, Rfl: 3 
  zolpidem CR (AMBIEN CR) 12.5 mg tablet, TAKE 1 TABLET BY MOUTH AT BEDTIME, Disp: , Rfl: 3 
  Dexlansoprazole (DEXILANT) 60 mg CpDB, Take  by mouth daily. , Disp: , Rfl:  
  diphenoxylate-atropine (LOMOTIL) 2.5-0.025 mg per tablet, Take  by mouth four (4) times daily as needed for Diarrhea., Disp: , Rfl:  
  traZODone (DESYREL) 50 mg tablet, Take  by mouth nightly., Disp: , Rfl:  
 
 
 
Allergies Allergen Reactions  Remicade [Infliximab] Other (comments) MRI Results (most recent): 
Results from Hospital Encounter encounter on 09/21/19 MRI BRAIN WO CONT Narrative EXAM:  MRI BRAIN WO CONT INDICATION:  Workup for TIA, patient presented to the ED with acute onset 
multiple episodes left-sided numbness on the day of arrival lasting short 
periods of time. Upon arrival symptoms had resolved. TECHNIQUE: Sagittal T1, axial FLAIR, T2,T1 and gradient echo images as well as 
coronal T2 weighted images and axial diffusion weighted images of the head were 
obtained. COMPARISON:  CT, CTA. FINDINGS: 
The ventricular size and configuration are normal.  
Scattered tiny punctate foci of T2 hyperintensity in the cerebral white matter 
without associated restricted diffusion. Nonspecific pattern and possibly 
chronic. Otherwise normal signal in the brain. No evidence of hemorrhage, mass, infarct or abnormal extra-axial fluid 
collections. Flow voids are present in the vertebral basilar and carotid artery systems. The craniocervical junction is unremarkable. Mild mucosal thickening paranasal sinuses. Impression IMPRESSION: No acute intracranial abnormality demonstrated. Results from [facility] Encounter encounter on 09/21/19 MRI BRAIN WO CONT Narrative EXAM:  MRI BRAIN WO CONT INDICATION:  Workup for TIA, patient presented to the ED with acute onset 
multiple episodes left-sided numbness on the day of arrival lasting short 
periods of time. Upon arrival symptoms had resolved. TECHNIQUE: Sagittal T1, axial FLAIR, T2,T1 and gradient echo images as well as 
coronal T2 weighted images and axial diffusion weighted images of the head were 
obtained. COMPARISON:  CT, CTA. FINDINGS: 
The ventricular size and configuration are normal.  
Scattered tiny punctate foci of T2 hyperintensity in the cerebral white matter 
without associated restricted diffusion. Nonspecific pattern and possibly 
chronic. Otherwise normal signal in the brain. No evidence of hemorrhage, mass, infarct or abnormal extra-axial fluid 
collections. Flow voids are present in the vertebral basilar and carotid artery systems. The craniocervical junction is unremarkable. Mild mucosal thickening paranasal sinuses. Impression IMPRESSION: No acute intracranial abnormality demonstrated. Review of Systems: A comprehensive review of systems was negative except for: Neurological: positive for paresthesia Vitals:  
 12/12/19 1207 BP: (!) 150/96 Pulse: 68 Resp: 16 SpO2: (!) 68% Weight: 202 lb (91.6 kg) Height: 6' (1.829 m) Objective: I 
 
 
NEUROLOGICAL EXAM: 
 
Appearance: The patient is well developed, well nourished, provides a coherent history and is in no acute distress. Mental Status: Oriented to time, place and person, and the president, cognitive function is normal and speech is fluent and no aphasia or dysarthria. Mood and affect appropriate. Cranial Nerves:   Intact visual fields. Fundi are benign, disc are flat, no lesions seen on funduscopy. SHIVAM, EOM's full, no nystagmus, no ptosis. Facial sensation is normal. Corneal reflexes are not tested. Facial movement is symmetric. Hearing is normal bilaterally. Palate is midline with normal sternocleidomastoid and trapezius muscles are normal. Tongue is midline. Neck without meningismus or bruits Temporal arteries are not tender or enlarged TMJ areas are not tender on palpation Motor:  5/5 strength in upper and lower proximal and distal muscles. Normal bulk and tone. No fasciculations. Rapid alternating movement is symmetric and intact bilaterally Reflexes:   Deep tendon reflexes 2+/4 and symmetrical. 
No babinski or clonus present Sensory:   Normal to touch, pinprick and vibration and temperature. DSS is intact Gait:  Normal gait for patient's age. Tremor:   No tremor noted. Cerebellar:  No abnormal cerebellar signs present on Romberg and tandem testing and finger-nose-finger exam.  
Neurovascular:  Normal heart sounds and regular rhythm, peripheral pulses intact, and no carotid bruits. Assessment: ICD-10-CM ICD-9-CM 1. Fibromyalgia M79.7 729.1 2. TIA (transient ischemic attack) G45.9 435.9 3. Bilateral carotid artery stenosis I65.23 433.10   
  433.30   
4. Transient ischemic attack involving right internal carotid artery G45.1 435.8 Active Problems: * No active hospital problems. * 
 
 
Plan:  
 
Patient doing well, no recurrent strokelike symptoms in the last 3 months, and his work-up was essentially negative for stroke and he was diagnosed with a TIA and has been more than 3 months and he has no deficits We advised the patient he can drive again We went over the warning signs of stroke as far as be fast, and the main risk factors for stroke as above.  
He is to continue his aspirin and Pravachol and try to quit smoking, and remain mentally and physically active, try to exercise regularly, eat a good diet, and we will see him again in 1 years time or earlier as need be and repeat his Dopplers then, and he will call if any problem in the interim. Reviewed his case with the patient and his wife in detail. They agree with plans and therapy. Signed By: Maricruz Brian MD   
 December 12, 2019 CC: Ceasar Spear MD 
FAX: [phone] This note will not be viewable in [address] If you have questions, please do not hesitate to call me. I look forward to following your patient along with you. Sincerely, Maricruz Brian MD

## 2019-12-12 NOTE — PROGRESS NOTES
Consult  REFERRED BY:  Priscilla Severance, MD    CHIEF COMPLAINT: Numbness of the left side      Subjective:     Santo Thurston is a 61 y.o. left-handed  male seen at the request of Dr. Emma Hi for evaluation of new problem of elevated blood pressure, with his blood pressure being 176/96 initially, but then when repeated was down to 150/96, and patient advised to keep track of his blood pressure at home, and contact Dr. Emma Hi if it remains consistently over 140/90. He was advised that this is the guidelines, and the new guidelines are 130/80 he should aim for that eventually. He is taking Zestril 20 mg a day for his blood pressure. He states that home and has been doing better. He is seen for follow-up of his stroke that occurred September 2019 when he had left-sided numbness that came and went over about a 3-hour period yesterday with spells lasting 25 to 45 seconds of left-sided numbness involving his face arm and leg. Patient denies any chest pain or palpitations or any cardiac symptoms. The patient denied any weakness or coordination problems on the left side and denies any difficulty with cognition, speech, swallowing, or his gait. He did not have any precipitating cause for this. He never had a similar episode before. He is been placed on aspirin therapy and a statin, and is getting a complete neurovascular evaluation. His CTA of the head neck was normal, and CT of the head was normal.  His MRI scan and that was normal.  This is most likely just a TIA, not a stroke, but patient obviously had a focal neurologic deficit, and needs complete neurovascular evaluation to rule out any treatable cause of his symptoms. Patient is a high risk for further neurologic disability or deterioration if he has recurrent focal ischemic deficits or a stroke. His echocardiogram was okay, and he is currently on 81 mg of aspirin and 40 mg of Pravachol for stroke prevention.   He has had no recurrent strokelike symptoms in the interim and done well medically except for having recent upper respiratory infection and bronchitis and on antibiotics for that. He still smokes and is going to try to get off the cigarettes. He knows that is the main risk factor of his stroke as his hypertension which he is working on with his PCP and his cholesterol seems to be better, and is not diabetic and can try to watch his diet.   He is advised of the warning signs of stroke as far as be fast, as balance, eyes, face, arms, sensory, time        Past Medical History:   Diagnosis Date    Chronic insomnia 8/16/2018    Colon polyps 8/14/2018    Crohn's disease ([facility] [address] 8/14/2018    Depression 8/14/2018    Diverticulosis 8/14/2018    Fibromyalgia 8/14/2018    Gastroesophageal reflux disease without esophagitis 8/16/2018    IBS (irritable bowel syndrome) 8/14/2018    Onychomycosis 8/14/2018    Reactive arthritis ([facility] [address] 8/14/2018    Sarcoidosis 8/14/2018      Past Surgical History:   Procedure Laterality Date    HX ORCHIECTOMY Left     HX ORTHOPAEDIC      L3-5 lumbar surgery    HX TONSILLECTOMY       Family History   Problem Relation Age of Onset    Hypertension Mother     Hypertension Father     Prostate Cancer Father     Stroke Father     Hypertension Brother     Hypertension Paternal Grandfather     Colon Cancer Paternal Grandfather     Colon Cancer Paternal Uncle     No Known Problems Child       Social History     Tobacco Use    Smoking status: Current Every Day Smoker     Packs/day: 1.50    Smokeless tobacco: Never Used   Substance Use Topics    Alcohol use: No         Current Outpatient Medications:     aspirin 81 mg chewable tablet, TAKE 1 TABLET BY MOUTH EVERY DAY, Disp: 30 Tab, Rfl: 5    pravastatin (PRAVACHOL) 40 mg tablet, TAKE 1 TABLET BY MOUTH EVERY DAY AT NIGHT, Disp: 30 Tab, Rfl: 5    lisinopril (PRINIVIL, ZESTRIL) 20 mg tablet, TAKE 1 TABLET BY MOUTH EVERY DAY, Disp: 90 Tab, Rfl: 1    DULoxetine (CYMBALTA) 60 mg capsule, TAKE 1 CAPSULE BY MOUTH EVERY DAY, Disp: 90 Cap, Rfl: 3    zolpidem CR (AMBIEN CR) 12.5 mg tablet, TAKE 1 TABLET BY MOUTH AT BEDTIME, Disp: , Rfl: 3    Dexlansoprazole (DEXILANT) 60 mg CpDB, Take  by mouth daily. , Disp: , Rfl:     diphenoxylate-atropine (LOMOTIL) 2.5-0.025 mg per tablet, Take  by mouth four (4) times daily as needed for Diarrhea., Disp: , Rfl:     traZODone (DESYREL) 50 mg tablet, Take  by mouth nightly., Disp: , Rfl:         Allergies   Allergen Reactions    Remicade [Infliximab] Other (comments)      MRI Results (most recent):  Results from Hospital Encounter encounter on 09/21/19   MRI BRAIN WO CONT    Narrative EXAM:  MRI BRAIN WO CONT  INDICATION:  Workup for TIA, patient presented to the ED with acute onset  multiple episodes left-sided numbness on the day of arrival lasting short  periods of time. Upon arrival symptoms had resolved. TECHNIQUE: Sagittal T1, axial FLAIR, T2,T1 and gradient echo images as well as  coronal T2 weighted images and axial diffusion weighted images of the head were  obtained. COMPARISON:  CT, CTA. FINDINGS:  The ventricular size and configuration are normal.   Scattered tiny punctate foci of T2 hyperintensity in the cerebral white matter  without associated restricted diffusion. Nonspecific pattern and possibly  chronic. Otherwise normal signal in the brain. No evidence of hemorrhage, mass, infarct or abnormal extra-axial fluid  collections. Flow voids are present in the vertebral basilar and carotid artery systems. The craniocervical junction is unremarkable. Mild mucosal thickening paranasal sinuses. Impression IMPRESSION: No acute intracranial abnormality demonstrated.          Results from [place] encounter on 09/21/19   MRI BRAIN WO CONT    Narrative EXAM:  MRI BRAIN WO CONT  INDICATION:  Workup for TIA, patient presented to the ED with acute onset  multiple episodes left-sided numbness on the day of arrival lasting short  periods of time. Upon arrival symptoms had resolved. TECHNIQUE: Sagittal T1, axial FLAIR, T2,T1 and gradient echo images as well as  coronal T2 weighted images and axial diffusion weighted images of the head were  obtained. COMPARISON:  CT, CTA. FINDINGS:  The ventricular size and configuration are normal.   Scattered tiny punctate foci of T2 hyperintensity in the cerebral white matter  without associated restricted diffusion. Nonspecific pattern and possibly  chronic. Otherwise normal signal in the brain. No evidence of hemorrhage, mass, infarct or abnormal extra-axial fluid  collections. Flow voids are present in the vertebral basilar and carotid artery systems. The craniocervical junction is unremarkable. Mild mucosal thickening paranasal sinuses. Impression IMPRESSION: No acute intracranial abnormality demonstrated. Review of Systems:  A comprehensive review of systems was negative except for: Neurological: positive for paresthesia   Vitals:    12/12/19 1207   BP: (!) 150/96   Pulse: 68   Resp: 16   SpO2: (!) 68%   Weight: 202 lb (91.6 kg)   Height: 6' (1.829 m)     Objective:     I      NEUROLOGICAL EXAM:    Appearance: The patient is well developed, well nourished, provides a coherent history and is in no acute distress. Mental Status: Oriented to time, place and person, and the president, cognitive function is normal and speech is fluent and no aphasia or dysarthria. Mood and affect appropriate. Cranial Nerves:   Intact visual fields. Fundi are benign, disc are flat, no lesions seen on funduscopy. SHIVAM, EOM's full, no nystagmus, no ptosis. Facial sensation is normal. Corneal reflexes are not tested. Facial movement is symmetric. Hearing is normal bilaterally. Palate is midline with normal sternocleidomastoid and trapezius muscles are normal. Tongue is midline.   Neck without meningismus or bruits  Temporal arteries are not tender or enlarged  TMJ areas are not tender on palpation   Motor:  5/5 strength in upper and lower proximal and distal muscles. Normal bulk and tone. No fasciculations. Rapid alternating movement is symmetric and intact bilaterally   Reflexes:   Deep tendon reflexes 2+/4 and symmetrical.  No babinski or clonus present   Sensory:   Normal to touch, pinprick and vibration and temperature. DSS is intact   Gait:  Normal gait for patient's age. Tremor:   No tremor noted. Cerebellar:  No abnormal cerebellar signs present on Romberg and tandem testing and finger-nose-finger exam.   Neurovascular:  Normal heart sounds and regular rhythm, peripheral pulses intact, and no carotid bruits. Assessment:       ICD-10-CM ICD-9-CM    1. Fibromyalgia M79.7 729.1    2. TIA (transient ischemic attack) G45.9 435.9    3. Bilateral carotid artery stenosis I65.23 433.10      433.30    4. Transient ischemic attack involving right internal carotid artery G45.1 435.8      Active Problems:    * No active hospital problems. *      Plan:     Patient doing well, no recurrent strokelike symptoms in the last 3 months, and his work-up was essentially negative for stroke and he was diagnosed with a TIA and has been more than 3 months and he has no deficits  We advised the patient he can drive again  We went over the warning signs of stroke as far as be fast, and the main risk factors for stroke as above. He is to continue his aspirin and Pravachol and try to quit smoking, and remain mentally and physically active, try to exercise regularly, eat a good diet, and we will see him again in 1 years time or earlier as need be and repeat his Dopplers then, and he will call if any problem in the interim. Reviewed his case with the patient and his wife in detail. They agree with plans and therapy. Signed By: Elicia Carrillo MD     December 12, 2019       CC: Jimmy Fontenot MD  FAX: [phone]    This note will not be viewable in [address]

## 2020-02-26 ENCOUNTER — OFFICE VISIT (OUTPATIENT)
Dept: INTERNAL MEDICINE CLINIC | Age: 60
End: 2020-02-26

## 2020-02-26 VITALS
BODY MASS INDEX: 26.33 KG/M2 | DIASTOLIC BLOOD PRESSURE: 72 MMHG | TEMPERATURE: 98.1 F | HEART RATE: 82 BPM | OXYGEN SATURATION: 96 % | SYSTOLIC BLOOD PRESSURE: 122 MMHG | RESPIRATION RATE: 20 BRPM | WEIGHT: 194.4 LBS | HEIGHT: 72 IN

## 2020-02-26 DIAGNOSIS — K50.919 CROHN'S DISEASE WITH COMPLICATION, UNSPECIFIED GASTROINTESTINAL TRACT LOCATION (HCC): Chronic | ICD-10-CM

## 2020-02-26 DIAGNOSIS — K21.9 GASTROESOPHAGEAL REFLUX DISEASE WITHOUT ESOPHAGITIS: Chronic | ICD-10-CM

## 2020-02-26 DIAGNOSIS — E78.00 HYPERCHOLESTEROLEMIA: Chronic | ICD-10-CM

## 2020-02-26 DIAGNOSIS — M79.7 FIBROMYALGIA: ICD-10-CM

## 2020-02-26 DIAGNOSIS — M02.30 REACTIVE ARTHRITIS (HCC): Chronic | ICD-10-CM

## 2020-02-26 DIAGNOSIS — G45.9 TIA (TRANSIENT ISCHEMIC ATTACK): Chronic | ICD-10-CM

## 2020-02-26 DIAGNOSIS — F51.04 CHRONIC INSOMNIA: Chronic | ICD-10-CM

## 2020-02-26 DIAGNOSIS — I10 ESSENTIAL HYPERTENSION: Primary | Chronic | ICD-10-CM

## 2020-02-26 DIAGNOSIS — Z79.899 LONG-TERM USE OF HIGH-RISK MEDICATION: Chronic | ICD-10-CM

## 2020-02-26 DIAGNOSIS — F33.0 DEPRESSION, MAJOR, RECURRENT, MILD (HCC): ICD-10-CM

## 2020-02-26 NOTE — PROGRESS NOTES
This note will not be viewable in [address] Thor Ramos. is a 61 y.o. male and presents with Follow Up Chronic Condition (6 mo fu) and Cough  . Subjective:  Mr. Gavi Alford returns to the office today in follow-up of multiple medical problems and also complaining of a persistent cough. The patient has hypercholesterolemia currently on statin therapy. He denies muscle soreness or GI upset. He has no history of coronary artery disease and denies exertional chest pains or claudication. He does have a history of a TIA that was treated at [facility] in September. He is on a daily aspirin. He has had no recurrence of symptoms. He remains on lisinopril for his blood pressure. He denies any dizziness, rash or lower extremity edema. He denies headaches, numbness, tingling or focal neurological problems. GERD is managed on Dexilant. He denies breakthrough heartburn and has had no dysphagia, early satiety or unexplained weight loss. He is followed by Dr. Enedina Casanova for Crohn's disease. He does have periodic flares of his Crohn's disease and has taken Imodium and Lomotil in the past for this. Subsequent to his Crohn's he has had a reactive arthritis and also has fibromyalgia. He does take trazodone for his fibromyalgia. He has had no recent flares. He has a significant depression for which she is on duloxetine. This seems to be helping his depression but also helps his fibromyalgia. The patient does have some chronic insomnia as well and takes Ambien CR at bedtime. He is able to tolerate this without any hangover effect, memory loss or tolerance to the medication. The patient around [facility] developed an upper respiratory infection and was seen at [facility] a couple of times and treated with Z-Mir. A chest x-ray was negative. He has had a persistent intermittent cough productive of some thick phlegm. He has had no fevers, chills or night sweats.   He is not taking any type of cough suppressant. Past Medical History:   Diagnosis Date    Chronic insomnia 8/16/2018    Colon polyps 8/14/2018    Crohn's disease ([facility] [address] 8/14/2018    Depression 8/14/2018    Diverticulosis 8/14/2018    Fibromyalgia 8/14/2018    Gastroesophageal reflux disease without esophagitis 8/16/2018    IBS (irritable bowel syndrome) 8/14/2018    Onychomycosis 8/14/2018    Reactive arthritis ([facility] [address] 8/14/2018    Sarcoidosis 8/14/2018     Past Surgical History:   Procedure Laterality Date    HX ORCHIECTOMY Left     HX ORTHOPAEDIC      L3-5 lumbar surgery    HX TONSILLECTOMY       Allergies   Allergen Reactions    Remicade [Infliximab] Other (comments)     Current Outpatient Medications   Medication Sig Dispense Refill    aspirin 81 mg chewable tablet TAKE 1 TABLET BY MOUTH EVERY DAY 30 Tab 5    pravastatin (PRAVACHOL) 40 mg tablet TAKE 1 TABLET BY MOUTH EVERY DAY AT NIGHT 30 Tab 5    lisinopril (PRINIVIL, ZESTRIL) 20 mg tablet TAKE 1 TABLET BY MOUTH EVERY DAY 90 Tab 1    DULoxetine (CYMBALTA) 60 mg capsule TAKE 1 CAPSULE BY MOUTH EVERY DAY 90 Cap 3    zolpidem CR (AMBIEN CR) 12.5 mg tablet TAKE 1 TABLET BY MOUTH AT BEDTIME  3    Dexlansoprazole (DEXILANT) 60 mg CpDB Take  by mouth daily.  traZODone (DESYREL) 50 mg tablet Take  by mouth nightly.        Social History     Socioeconomic History    Marital status:      Spouse name: Not on file    Number of children: 2    Years of education: Not on file    Highest education level: Not on file   Occupational History    Occupation: refinishes furniture   Tobacco Use    Smoking status: Current Every Day Smoker     Packs/day: 1.50    Smokeless tobacco: Never Used   Substance and Sexual Activity    Alcohol use: No    Drug use: Never     Family History   Problem Relation Age of Onset    Hypertension Mother     Hypertension Father     Prostate Cancer Father     Stroke Father     Hypertension Brother     Hypertension Paternal Grandfather     Colon Cancer Paternal Grandfather     Colon Cancer Paternal Uncle     No Known Problems Child        Health Maintenance   Topic Date Due    Pneumococcal 0-64 years (1 of 1 - PPSV23) 06/19/1966    Shingrix Vaccine Age 50> (1 of 2) 06/19/2010    Colonoscopy  07/20/2020    A1C test (Diabetic or Prediabetic)  09/22/2020    Lipid Screen  09/22/2020    DTaP/Tdap/Td series (2 - Td) 08/16/2028    Hepatitis C Screening  Completed    Influenza Age 5 to Adult  Completed        Review of Systems  Constitutional: negative for fevers, chills, anorexia and weight loss  Eyes:   negative for visual disturbance and irritation  ENT:   negative for tinnitus,sore throat,nasal congestion,ear pain,hoarseness  Respiratory:  Positive for residual cough from URI  CV:   negative for chest pain, palpitations, lower extremity edema  GI:   negative for nausea, vomiting, diarrhea, abdominal pain,melena  Endo:               negative for polyuria,polydipsia,polyphagia,heat intolerance  Genitourinary: negative for frequency, dysuria and hematuria  Integumentary: negative for rash and pruritus  Hematologic:  negative for easy bruising and gum/nose bleeding  Musculoskel: negative for myalgias, arthralgias, back pain, muscle weakness, joint pain  Neurological:  negative for headaches, dizziness, vertigo, memory problems and gait   Behavl/Psych: negative for feelings of anxiety, depression, mood changes  ROS otherwise negative      Objective:  Visit Vitals  /72 (BP 1 Location: Right arm, BP Patient Position: Sitting)   Pulse 82   Temp 98.1 °F (36.7 °C) (Oral)   Resp 20   Ht 6' (1.829 m)   Wt 194 lb 6.4 oz (88.2 kg)   SpO2 96%   BMI 26.37 kg/m²     Body mass index is 26.37 kg/m².     Physical Exam:   General appearance - alert, well appearing, and in no distress  Mental status - alert, oriented to person, place, and time  EYE-SHIVAM, EOMI,conjunctiva normal bilaterally, lids normal  ENT-ENT exam normal, no neck nodes or sinus tenderness  Nose - normal and patent, no erythema,  Or discharge   Mouth - mucous membranes moist, pharynx normal without lesions  Neck - supple, no significant adenopathy or bruit  Chest - clear to auscultation, no wheezes, rales or rhonchi. Heart - normal rate, regular rhythm, normal S1, S2, no murmurs, rubs, clicks or gallops   Abdomen - soft, nontender, nondistended, no masses or organomegaly  Lymph- no adenopathy palpable  Ext-peripheral pulses normal, no pedal edema, no clubbing or cyanosis  Skin-Warm and dry. no hyperpigmentation, vitiligo, or suspicious lesions  Neuro -alert, oriented, normal speech, no focal findings or movement disorder noted      Assessment/Plan:  Diagnoses and all orders for this visit:    Essential hypertension    Hypercholesterolemia    Long-term use of high-risk medication    TIA (transient ischemic attack)    Reactive arthritis ([facility] [address]    Gastroesophageal reflux disease without esophagitis    Chronic insomnia    Crohn's disease with complication, unspecified gastrointestinal tract location [facility])    Depression, major, recurrent, mild (HCC)    Fibromyalgia        Other instructions: The patient's medications were reviewed and reconciled. No change in his current medical regimen will be made. Body mass index is 26.4 and dietary counseling along with printed patient education is given    I have recommended twice daily Delsym to be taken until cough subsides. Labs from September's hospitalization at [facility] were reviewed today. Continued follow-up with Dr. Cathy Root regarding his Crohn's disease. Patient's depression is currently under control and he has had no exacerbation of symptoms. He is tolerating his duloxetine without any side effects and no changes in his current therapy will be made. Follow-up 6 months    Follow-up and Dispositions    · Return in about 6 months (around 8/26/2020).          I have reviewed with the patient details of the assessment and plan and all questions were answered. Relevent patient education was performed. The most recent lab findings were reviewed with the patient. An After Visit Summary was printed and given to the patient. Filiberto Diez MD    Please note that this dictation was completed with [employer], the computer voice recognition software. Quite often unanticipated grammatical, syntax, homophones, and other interpretive errors are inadvertently transcribed by the computer software. Please disregard these errors. Please excuse any errors that have escaped final proofreading.

## 2020-02-26 NOTE — PATIENT INSTRUCTIONS

## 2020-02-26 NOTE — PROGRESS NOTES
Alex Rome. is a 61 y.o. male presenting for Follow Up Chronic Condition (6 mo fu) and Cough  . 1. Have you been to the ER, urgent care clinic since your last visit? Hospitalized since your last visit? 12-4-19 [facility] for [facility]    2. Have you seen or consulted any other health care providers outside of the [address] [place] [address] since your last visit? Include any pap smears or colon screening. No    No flowsheet data found. Abuse Screening Questionnaire 7/8/2019   Do you ever feel afraid of your partner? N   Are you in a relationship with someone who physically or mentally threatens you? N   Is it safe for you to go home? Y       3 most recent PHQ Screens 2/26/2020   Little interest or pleasure in doing things Not at all   Feeling down, depressed, irritable, or hopeless Not at all   Total Score PHQ 2 0   Trouble falling or staying asleep, or sleeping too much Not at all   Feeling tired or having little energy Not at all   Poor appetite, weight loss, or overeating Not at all   Feeling bad about yourself - or that you are a failure or have let yourself or your family down Not at all   Trouble concentrating on things such as school, work, reading, or watching TV Not at all   Moving or speaking so slowly that other people could have noticed; or the opposite being so fidgety that others notice Not at all   Thoughts of being better off dead, or hurting yourself in some way Not at all   PHQ 9 Score 0   How difficult have these problems made it for you to do your work, take care of your home and get along with others Not difficult at all       There are no discontinued medications.

## 2020-03-14 RX ORDER — GUAIFENESIN 100 MG/5ML
LIQUID (ML) ORAL
Qty: 90 TAB | Refills: 1 | Status: SHIPPED | OUTPATIENT
Start: 2020-03-14 | End: 2020-09-11

## 2020-03-28 DIAGNOSIS — I10 ESSENTIAL HYPERTENSION: Chronic | ICD-10-CM

## 2020-03-28 RX ORDER — LISINOPRIL 20 MG/1
TABLET ORAL
Qty: 90 TAB | Refills: 1 | Status: SHIPPED | OUTPATIENT
Start: 2020-03-28 | End: 2020-09-23

## 2020-05-09 RX ORDER — PRAVASTATIN SODIUM 40 MG/1
TABLET ORAL
Qty: 90 TAB | Refills: 1 | Status: SHIPPED | OUTPATIENT
Start: 2020-05-09 | End: 2020-09-23

## 2020-08-06 ENCOUNTER — TELEPHONE (OUTPATIENT)
Dept: INTERNAL MEDICINE CLINIC | Age: 60
End: 2020-08-06

## 2020-08-06 NOTE — TELEPHONE ENCOUNTER
Patient states he was around his grandson that is COVID19 positive 6 days ago. Patient does not have any symptoms.     Would like to know if he should get tested     [phone]

## 2020-08-06 NOTE — TELEPHONE ENCOUNTER
Would recommend that he check for fever on a daily basis and monitor for symptoms of COVID. Would stay at home at least until Monday and if no symptoms would just continue to follow normal recommendations of wearing a mask, maintaining social distance and washing hands frequently.

## 2020-08-27 ENCOUNTER — OFFICE VISIT (OUTPATIENT)
Dept: INTERNAL MEDICINE CLINIC | Age: 60
End: 2020-08-27
Payer: COMMERCIAL

## 2020-08-27 VITALS
SYSTOLIC BLOOD PRESSURE: 126 MMHG | OXYGEN SATURATION: 96 % | DIASTOLIC BLOOD PRESSURE: 82 MMHG | HEART RATE: 88 BPM | RESPIRATION RATE: 16 BRPM | TEMPERATURE: 98.1 F | HEIGHT: 72 IN | BODY MASS INDEX: 26.93 KG/M2 | WEIGHT: 198.8 LBS

## 2020-08-27 DIAGNOSIS — M02.30 REACTIVE ARTHRITIS (HCC): Chronic | ICD-10-CM

## 2020-08-27 DIAGNOSIS — G45.9 TIA (TRANSIENT ISCHEMIC ATTACK): Chronic | ICD-10-CM

## 2020-08-27 DIAGNOSIS — F51.04 CHRONIC INSOMNIA: Chronic | ICD-10-CM

## 2020-08-27 DIAGNOSIS — E78.00 HYPERCHOLESTEROLEMIA: Chronic | ICD-10-CM

## 2020-08-27 DIAGNOSIS — Z79.899 LONG-TERM USE OF HIGH-RISK MEDICATION: Chronic | ICD-10-CM

## 2020-08-27 DIAGNOSIS — I10 ESSENTIAL HYPERTENSION: Primary | Chronic | ICD-10-CM

## 2020-08-27 DIAGNOSIS — R53.82 CHRONIC FATIGUE: ICD-10-CM

## 2020-08-27 DIAGNOSIS — F33.0 DEPRESSION, MAJOR, RECURRENT, MILD (HCC): ICD-10-CM

## 2020-08-27 DIAGNOSIS — K50.919 CROHN'S DISEASE WITH COMPLICATION, UNSPECIFIED GASTROINTESTINAL TRACT LOCATION (HCC): Chronic | ICD-10-CM

## 2020-08-27 DIAGNOSIS — K21.9 GASTROESOPHAGEAL REFLUX DISEASE WITHOUT ESOPHAGITIS: Chronic | ICD-10-CM

## 2020-08-27 LAB
A-G RATIO,AGRAT: 1.6 RATIO
ALBUMIN SERPL-MCNC: 4.2 G/DL (ref 3.9–5.4)
ALP SERPL-CCNC: 114 U/L (ref 38–126)
ALT SERPL-CCNC: 27 U/L (ref 0–50)
ANION GAP SERPL CALC-SCNC: 10 MMOL/L
AST SERPL W P-5'-P-CCNC: 27 U/L (ref 14–36)
BILIRUB SERPL-MCNC: 0.2 MG/DL (ref 0.2–1.3)
BILIRUB UR QL: NEGATIVE
BUN SERPL-MCNC: 19 MG/DL (ref 9–20)
BUN/CREATININE RATIO,BUCR: 24 RATIO
CALCIUM SERPL-MCNC: 9.4 MG/DL (ref 8.4–10.2)
CHLORIDE SERPL-SCNC: 110 MMOL/L (ref 98–107)
CHOL/HDL RATIO,CHHD: 3 RATIO (ref 0–4)
CHOLEST SERPL-MCNC: 141 MG/DL (ref 0–200)
CK SERPL-CCNC: 180 U/L (ref 30–135)
CLARITY: CLEAR
CO2 SERPL-SCNC: 24 MMOL/L (ref 22–32)
COLOR UR: NORMAL
CREAT SERPL-MCNC: 0.8 MG/DL (ref 0.8–1.5)
ERYTHROCYTE [DISTWIDTH] IN BLOOD BY AUTOMATED COUNT: 13.7 %
GLOBULIN,GLOB: 2.7
GLUCOSE 24H UR-MRATE: NEGATIVE G/(24.H)
GLUCOSE SERPL-MCNC: 99 MG/DL (ref 75–110)
HCT VFR BLD AUTO: 46.2 % (ref 37–51)
HDLC SERPL-MCNC: 45 MG/DL (ref 35–130)
HGB BLD-MCNC: 15.3 G/DL (ref 12–18)
HGB UR QL STRIP: NEGATIVE
KETONES UR QL STRIP.AUTO: NEGATIVE
LDL/HDL RATIO,LDHD: 2 RATIO
LDLC SERPL CALC-MCNC: 77 MG/DL (ref 0–130)
LEUKOCYTE ESTERASE: NEGATIVE
LYMPHOCYTES ABSOLUTE: 1.4 K/UL (ref 0.6–4.1)
LYMPHOCYTES NFR BLD: 20.8 % (ref 10–58.5)
MCH RBC QN AUTO: 30.7 PG (ref 26–32)
MCHC RBC AUTO-ENTMCNC: 33.1 G/DL (ref 30–36)
MCV RBC AUTO: 92.6 FL (ref 80–97)
MONOCYTES ABS-DIF,2141: 0.6 K/UL (ref 0–1.8)
MONOCYTES NFR BLD: 9.3 % (ref 0.1–24)
NEUTROPHILS # BLD: 69.9 % (ref 37–92)
NEUTROPHILS ABS,2156: 4.7 K/UL (ref 2–7.8)
NITRITE UR QL STRIP.AUTO: NEGATIVE
PH UR STRIP: 5 [PH] (ref 5–7)
PLATELET # BLD AUTO: 272 K/UL (ref 140–440)
POTASSIUM SERPL-SCNC: 4.6 MMOL/L (ref 3.6–5)
PROT SERPL-MCNC: 6.9 G/DL (ref 6.3–8.2)
PROT UR STRIP-MCNC: NEGATIVE MG/DL
RBC # BLD AUTO: 4.99 M/UL (ref 4.2–6.3)
RBC #/AREA URNS HPF: 0 #/HPF
SODIUM SERPL-SCNC: 144 MMOL/L (ref 137–145)
SP GR UR REFRACTOMETRY: 1.02 (ref 1–1.03)
TRIGL SERPL-MCNC: 95 MG/DL (ref 0–200)
TSH SERPL DL<=0.05 MIU/L-ACNC: 0.85 UIU/ML (ref 0.34–5.6)
UROBILINOGEN UR QL STRIP.AUTO: NEGATIVE
VLDLC SERPL CALC-MCNC: 19 MG/DL
WBC # BLD AUTO: 6.7 K/UL (ref 4.1–10.9)
WBC URNS QL MICRO: 0 #/HPF

## 2020-08-27 PROCEDURE — 80061 LIPID PANEL: CPT | Performed by: INTERNAL MEDICINE

## 2020-08-27 PROCEDURE — 85025 COMPLETE CBC W/AUTO DIFF WBC: CPT | Performed by: INTERNAL MEDICINE

## 2020-08-27 PROCEDURE — 36415 COLL VENOUS BLD VENIPUNCTURE: CPT | Performed by: INTERNAL MEDICINE

## 2020-08-27 PROCEDURE — 80053 COMPREHEN METABOLIC PANEL: CPT | Performed by: INTERNAL MEDICINE

## 2020-08-27 PROCEDURE — 81001 URINALYSIS AUTO W/SCOPE: CPT | Performed by: INTERNAL MEDICINE

## 2020-08-27 PROCEDURE — 82550 ASSAY OF CK (CPK): CPT | Performed by: INTERNAL MEDICINE

## 2020-08-27 PROCEDURE — 84443 ASSAY THYROID STIM HORMONE: CPT | Performed by: INTERNAL MEDICINE

## 2020-08-27 PROCEDURE — 99214 OFFICE O/P EST MOD 30 MIN: CPT | Performed by: INTERNAL MEDICINE

## 2020-08-27 NOTE — PATIENT INSTRUCTIONS
DASH Diet: Care Instructions Your Care Instructions The DASH diet is an eating plan that can help lower your blood pressure. DASH stands for Dietary Approaches to Stop Hypertension. Hypertension is high blood pressure. The DASH diet focuses on eating foods that are high in calcium, potassium, and magnesium. These nutrients can lower blood pressure. The foods that are highest in these nutrients are fruits, vegetables, low-fat dairy products, nuts, seeds, and legumes. But taking calcium, potassium, and magnesium supplements instead of eating foods that are high in those nutrients does not have the same effect. The DASH diet also includes whole grains, fish, and poultry. The DASH diet is one of several lifestyle changes your doctor may recommend to lower your high blood pressure. Your doctor may also want you to decrease the amount of sodium in your diet. Lowering sodium while following the DASH diet can lower blood pressure even further than just the DASH diet alone. Follow-up care is a key part of your treatment and safety. Be sure to make and go to all appointments, and call your doctor if you are having problems. It's also a good idea to know your test results and keep a list of the medicines you take. How can you care for yourself at home? Following the DASH diet · Eat 4 to 5 servings of fruit each day. A serving is 1 medium-sized piece of fruit, ½ cup chopped or canned fruit, 1/4 cup dried fruit, or 4 ounces (½ cup) of fruit juice. Choose fruit more often than fruit juice. · Eat 4 to 5 servings of vegetables each day. A serving is 1 cup of lettuce or raw leafy vegetables, ½ cup of chopped or cooked vegetables, or 4 ounces (½ cup) of vegetable juice. Choose vegetables more often than vegetable juice. · Get 2 to 3 servings of low-fat and fat-free dairy each day. A serving is 8 ounces of milk, 1 cup of yogurt, or 1 ½ ounces of cheese. · Eat 6 to 8 servings of grains each day. A serving is 1 slice of bread, 1 ounce of dry cereal, or ½ cup of cooked rice, pasta, or cooked cereal. Try to choose whole-grain products as much as possible. · Limit lean meat, poultry, and fish to 2 servings each day. A serving is 3 ounces, about the size of a deck of cards. · Eat 4 to 5 servings of nuts, seeds, and legumes (cooked dried beans, lentils, and split peas) each week. A serving is 1/3 cup of nuts, 2 tablespoons of seeds, or ½ cup of cooked beans or peas. · Limit fats and oils to 2 to 3 servings each day. A serving is 1 teaspoon of vegetable oil or 2 tablespoons of salad dressing. · Limit sweets and added sugars to 5 servings or less a week. A serving is 1 tablespoon jelly or jam, ½ cup sorbet, or 1 cup of lemonade. · Eat less than 2,300 milligrams (mg) of sodium a day. If you limit your sodium to 1,500 mg a day, you can lower your blood pressure even more. Tips for success · Start small. Do not try to make dramatic changes to your diet all at once. You might feel that you are missing out on your favorite foods and then be more likely to not follow the plan. Make small changes, and stick with them. Once those changes become habit, add a few more changes. · Try some of the following: ? Make it a goal to eat a fruit or vegetable at every meal and at snacks. This will make it easy to get the recommended amount of fruits and vegetables each day. ? Try yogurt topped with fruit and nuts for a snack or healthy dessert. ? Add lettuce, tomato, cucumber, and onion to sandwiches. ? Combine a ready-made pizza crust with low-fat mozzarella cheese and lots of vegetable toppings. Try using tomatoes, squash, spinach, broccoli, carrots, cauliflower, and onions. ? Have a variety of cut-up vegetables with a low-fat dip as an appetizer instead of chips and dip. ? Sprinkle sunflower seeds or chopped almonds over salads.  Or try adding chopped walnuts or almonds to cooked vegetables. ? Try some vegetarian meals using beans and peas. Add garbanzo or kidney beans to salads. Make burritos and tacos with mashed giron beans or black beans. Where can you learn more? Go to http://mane-evelio.info/ Enter V554 in the search box to learn more about \"DASH Diet: Care Instructions. \" Current as of: December 16, 2019               Content Version: 12.5 © 7363-2112 [employer]. Care instructions adapted under license by [employer] (which disclaims liability or warranty for this information). If you have questions about a medical condition or this instruction, always ask your healthcare professional. [address] any warranty or liability for your use of this information.

## 2020-08-27 NOTE — PROGRESS NOTES
Marycruz William. is a 61 y.o. male presenting for Follow Up Chronic Condition (6 mo fu)  . 1. Have you been to the ER, urgent care clinic since your last visit? Hospitalized since your last visit? No    2. Have you seen or consulted any other health care providers outside of the [facility] [place] since your last visit? Include any pap smears or colon screening. No    No flowsheet data found. Abuse Screening Questionnaire 7/8/2019   Do you ever feel afraid of your partner? N   Are you in a relationship with someone who physically or mentally threatens you? N   Is it safe for you to go home? Y       3 most recent PHQ Screens 2/26/2020   Little interest or pleasure in doing things Not at all   Feeling down, depressed, irritable, or hopeless Not at all   Total Score PHQ 2 0   Trouble falling or staying asleep, or sleeping too much Not at all   Feeling tired or having little energy Not at all   Poor appetite, weight loss, or overeating Not at all   Feeling bad about yourself - or that you are a failure or have let yourself or your family down Not at all   Trouble concentrating on things such as school, work, reading, or watching TV Not at all   Moving or speaking so slowly that other people could have noticed; or the opposite being so fidgety that others notice Not at all   Thoughts of being better off dead, or hurting yourself in some way Not at all   PHQ 9 Score 0   How difficult have these problems made it for you to do your work, take care of your home and get along with others Not difficult at all       There are no discontinued medications.

## 2020-08-27 NOTE — PROGRESS NOTES
This note will not be viewable in [address] Lito Fletcher is a 61 y.o. male and presents with Follow Up Chronic Condition (6 mo fu)  . Subjective:  Mr. Lauryn Bhakta presents to the office today in follow-up of multiple medical problems. The patient has hypertension and remains on lisinopril. He denies any rash, headaches, numbness, tingling or focal neurological problems. He recently has been having some orthostatic dizziness but states he has not been drinking much in the way of fluids. He remains on pravastatin for hypercholesterolemia. Tolerates this without muscle soreness or GI upset. Has no history of coronary artery disease and denies exertional chest pains or claudication. He does have a history of previous TIA for which he is on aspirin. He has had no recurrent symptoms. GERD is currently managed on Dexilant. He has had no breakthrough heartburn and denies dysphagia, early satiety or unexplained weight loss. Crohn's disease with secondary reactive arthritis is currently managed by Dr. Otilia Veloz. He has had periodic flares of his GI symptoms and is due for a follow-up colonoscopy at the present time. He denies any blood in his stool. He has chronic insomnia for which she is on trazodone and Ambien. This continues to work effectively for him as he has had no tolerance to the medication and it continues to work effectively for him. He does have a history of depression and remains on Cymbalta. He continues to work effectively for him without any reoccurrence of symptoms and he has had no side effects related to the medication. The patient complains of a cough and fatigue. He got extremely sick around the first of the year. The cough is been persistent since that time as has his fatigue symptoms. The patient has never been tested for coronavirus or antibodies. He denies any fevers. Cough is largely nonproductive.   He denies PND, orthopnea or lower extremity edema. Past Medical History:   Diagnosis Date    Chronic insomnia 8/16/2018    Colon polyps 8/14/2018    Crohn's disease ([facility] [address] 8/14/2018    Depression 8/14/2018    Diverticulosis 8/14/2018    Fibromyalgia 8/14/2018    Gastroesophageal reflux disease without esophagitis 8/16/2018    IBS (irritable bowel syndrome) 8/14/2018    Onychomycosis 8/14/2018    Reactive arthritis ([facility] [address] 8/14/2018    Sarcoidosis 8/14/2018     Past Surgical History:   Procedure Laterality Date    HX ORCHIECTOMY Left     HX ORTHOPAEDIC      L3-5 lumbar surgery    HX TONSILLECTOMY       Allergies   Allergen Reactions    Remicade [Infliximab] Other (comments)     Current Outpatient Medications   Medication Sig Dispense Refill    pravastatin (PRAVACHOL) 40 mg tablet TAKE 1 TABLET BY MOUTH EVERY DAY AT NIGHT 90 Tab 1    lisinopriL (PRINIVIL, ZESTRIL) 20 mg tablet TAKE 1 TABLET BY MOUTH EVERY DAY 90 Tab 1    aspirin 81 mg chewable tablet TAKE 1 TABLET BY MOUTH EVERY DAY 90 Tab 1    DULoxetine (CYMBALTA) 60 mg capsule TAKE 1 CAPSULE BY MOUTH EVERY DAY 90 Cap 3    zolpidem CR (AMBIEN CR) 12.5 mg tablet TAKE 1 TABLET BY MOUTH AT BEDTIME  3    Dexlansoprazole (DEXILANT) 60 mg CpDB Take  by mouth daily.  traZODone (DESYREL) 50 mg tablet Take  by mouth nightly.        Social History     Socioeconomic History    Marital status:      Spouse name: Not on file    Number of children: 2    Years of education: Not on file    Highest education level: Not on file   Occupational History    Occupation: refinishes furniture   Tobacco Use    Smoking status: Current Every Day Smoker     Packs/day: 1.50    Smokeless tobacco: Never Used   Substance and Sexual Activity    Alcohol use: No    Drug use: Never     Family History   Problem Relation Age of Onset    Hypertension Mother     Hypertension Father     Prostate Cancer Father     Stroke Father     Hypertension Brother     Hypertension Paternal Grandfather     Colon Cancer Paternal Grandfather     Colon Cancer Paternal Uncle     No Known Problems Child        Health Maintenance   Topic Date Due    Pneumococcal 0-64 years (1 of 1 - PPSV23) [birth year]19/1966    Shingrix Vaccine Age 50> (1 of 2) 06/19/2010    Colonoscopy  07/20/2020    A1C test (Diabetic or Prediabetic)  09/22/2020    Lipid Screen  09/22/2020    DTaP/Tdap/Td series (2 - Td) 08/16/2028    Hepatitis C Screening  Completed        Review of Systems  Constitutional: Positive for chronic fatigue without weight loss  Eyes:   negative for visual disturbance and irritation  ENT:   negative for tinnitus,sore throat,nasal congestion,ear pain,hoarseness  Respiratory:  Positive for residual cough related to infection at the first of the year  CV:   negative for chest pain, palpitations, lower extremity edema  GI:   Positive for intermittent flare of his Crohn's disease   Endo:               negative for polyuria,polydipsia,polyphagia,heat intolerance  Genitourinary: negative for frequency, dysuria and hematuria  Integumentary: negative for rash and pruritus  Hematologic:  negative for easy bruising and gum/nose bleeding  Musculoskel: negative for myalgias, arthralgias, back pain, muscle weakness, joint pain  Neurological:  negative for headaches, dizziness, vertigo, memory problems and gait   Behavl/Psych: negative for feelings of anxiety, depression, mood changes  ROS otherwise negative      Objective:  Visit Vitals  /82 (BP 1 Location: Right arm, BP Patient Position: Sitting)   Pulse 88   Temp 98.1 °F (36.7 °C) (Oral)   Resp 16   Ht 6' (1.829 m)   Wt 198 lb 12.8 oz (90.2 kg)   SpO2 96%   BMI 26.96 kg/m²     Body mass index is 26.96 kg/m².     Physical Exam:   General appearance - alert, well appearing, and in no distress  Mental status - alert, oriented to person, place, and time  EYE-SHIVAM, EOMI,conjunctiva normal bilaterally, lids normal  ENT-ENT exam normal, no neck nodes or sinus tenderness  Nose - normal and patent, no erythema,  Or discharge   Mouth - mucous membranes moist, pharynx normal without lesions  Neck - supple, no significant adenopathy or bruit  Chest - clear to auscultation, no wheezes, rales or rhonchi. Heart - normal rate, regular rhythm, normal S1, S2, no murmurs, rubs, clicks or gallops   Abdomen - soft, nontender, nondistended, no masses or organomegaly  Lymph- no adenopathy palpable  Ext-peripheral pulses normal, no pedal edema, no clubbing or cyanosis  Skin-Warm and dry. no hyperpigmentation, vitiligo, or suspicious lesions  Neuro -alert, oriented, normal speech, no focal findings or movement disorder noted      Assessment/Plan:  Diagnoses and all orders for this visit:    Essential hypertension  -     COLLECTION VENOUS BLOOD,VENIPUNCTURE  -     CBC WITH AUTOMATED DIFF  -     METABOLIC PANEL, COMPREHENSIVE  -     URINALYSIS W/MICROSCOPIC    Hypercholesterolemia  -     LIPID PANEL  -     TSH 3RD GENERATION    Long-term use of high-risk medication  -     CK    Gastroesophageal reflux disease without esophagitis    Crohn's disease with complication, unspecified gastrointestinal tract location (HCC)    Reactive arthritis (HCC)    Chronic insomnia    TIA (transient ischemic attack)    Depression, major, recurrent, mild (HCC)    Chronic fatigue  -     SARS-COV-2 AB, IGG        Other instructions: The patient's medications were reviewed and reconciled. No change in his current medical regimen will be made. A no added salt, prudent diet has been encouraged    Continued follow-up with Dr. Han Dunbar regarding Crohn's disease and need for colonoscopy    Await results of multiple labs    Obtain coronavirus IgG antibody testing due to history of severe upper respiratory infection with residual cough and continued fatigue since occurrence that occurred in January    Follow-up in 6 months    Follow-up and Dispositions    · Return in about 6 months (around 2/27/2021).          I have reviewed with the patient details of the assessment and plan and all questions were answered. Relevent patient education was performed. The most recent lab findings were reviewed with the patient. An After Visit Summary was printed and given to the patient. Rachael Salas MD    Please note that this dictation was completed with [employer], the computer voice recognition software. Quite often unanticipated grammatical, syntax, homophones, and other interpretive errors are inadvertently transcribed by the computer software. Please disregard these errors. Please excuse any errors that have escaped final proofreading.

## 2020-08-28 DIAGNOSIS — M79.7 FIBROMYALGIA: ICD-10-CM

## 2020-08-28 LAB — SARS-COV-2 AB, IGG, CORG1M: NEGATIVE

## 2020-08-28 RX ORDER — DULOXETIN HYDROCHLORIDE 60 MG/1
CAPSULE, DELAYED RELEASE ORAL
Qty: 90 CAP | Refills: 3 | Status: SHIPPED | OUTPATIENT
Start: 2020-08-28 | End: 2021-09-07

## 2020-08-28 NOTE — PROGRESS NOTES
Please notify patient. Labs stable.   Coronavirus antibody test negative  No change in current management/meds

## 2020-09-11 RX ORDER — GUAIFENESIN 100 MG/5ML
LIQUID (ML) ORAL
Qty: 90 TAB | Refills: 1 | Status: SHIPPED | OUTPATIENT
Start: 2020-09-11 | End: 2021-02-15

## 2020-09-23 DIAGNOSIS — I10 ESSENTIAL HYPERTENSION: Chronic | ICD-10-CM

## 2020-09-23 RX ORDER — LISINOPRIL 20 MG/1
TABLET ORAL
Qty: 90 TAB | Refills: 1 | Status: SHIPPED | OUTPATIENT
Start: 2020-09-23 | End: 2021-02-15

## 2020-09-23 RX ORDER — PRAVASTATIN SODIUM 40 MG/1
TABLET ORAL
Qty: 90 TAB | Refills: 1 | Status: SHIPPED | OUTPATIENT
Start: 2020-09-23 | End: 2021-02-15

## 2020-10-26 ENCOUNTER — TRANSCRIBE ORDER (OUTPATIENT)
Dept: SCHEDULING | Age: 60
End: 2020-10-26

## 2020-10-26 DIAGNOSIS — R10.32 ABDOMINAL PAIN, LEFT LOWER QUADRANT: ICD-10-CM

## 2020-10-26 DIAGNOSIS — R19.7 DIARRHEA: Primary | ICD-10-CM

## 2020-11-02 ENCOUNTER — TRANSCRIBE ORDER (OUTPATIENT)
Dept: SCHEDULING | Age: 60
End: 2020-11-02

## 2020-11-02 DIAGNOSIS — R10.32 LLQ PAIN: ICD-10-CM

## 2020-11-02 DIAGNOSIS — R19.7 DIARRHEA: Primary | ICD-10-CM

## 2020-11-02 DIAGNOSIS — K57.32 DIVERTICULITIS, COLON: ICD-10-CM

## 2020-11-11 ENCOUNTER — HOSPITAL ENCOUNTER (OUTPATIENT)
Dept: CT IMAGING | Age: 60
Discharge: HOME | End: 2020-11-11
Attending: NURSE PRACTITIONER
Payer: COMMERCIAL

## 2020-11-11 DIAGNOSIS — R10.32 ABDOMINAL PAIN, LEFT LOWER QUADRANT: ICD-10-CM

## 2020-11-11 DIAGNOSIS — R19.7 DIARRHEA: ICD-10-CM

## 2020-11-11 PROCEDURE — 74011000636 HC RX REV CODE- 636: Performed by: NURSE PRACTITIONER

## 2020-11-11 PROCEDURE — 74011000255 HC RX REV CODE- 255: Performed by: NURSE PRACTITIONER

## 2020-11-11 PROCEDURE — 74177 CT ABD & PELVIS W/CONTRAST: CPT

## 2020-11-11 RX ORDER — BARIUM SULFATE 20 MG/ML
900 SUSPENSION ORAL
Status: COMPLETED | OUTPATIENT
Start: 2020-11-11 | End: 2020-11-11

## 2020-11-11 RX ORDER — SODIUM CHLORIDE 0.9 % (FLUSH) 0.9 %
10 SYRINGE (ML) INJECTION
Status: COMPLETED | OUTPATIENT
Start: 2020-11-11 | End: 2020-11-11

## 2020-11-11 RX ADMIN — BARIUM SULFATE 900 ML: 20 SUSPENSION ORAL at 11:00

## 2020-11-11 RX ADMIN — IOPAMIDOL 100 ML: 755 INJECTION, SOLUTION INTRAVENOUS at 10:45

## 2020-11-11 RX ADMIN — Medication 10 ML: at 10:45

## 2021-02-08 ENCOUNTER — VIRTUAL VISIT (OUTPATIENT)
Dept: NEUROLOGY | Age: 61
End: 2021-02-08
Payer: COMMERCIAL

## 2021-02-08 DIAGNOSIS — I65.23 BILATERAL CAROTID ARTERY STENOSIS: ICD-10-CM

## 2021-02-08 DIAGNOSIS — E55.9 VITAMIN D DEFICIENCY: ICD-10-CM

## 2021-02-08 DIAGNOSIS — E53.8 B12 DEFICIENCY: ICD-10-CM

## 2021-02-08 DIAGNOSIS — E03.4 HYPOTHYROIDISM DUE TO ACQUIRED ATROPHY OF THYROID: ICD-10-CM

## 2021-02-08 DIAGNOSIS — G45.1 TRANSIENT ISCHEMIC ATTACK INVOLVING RIGHT INTERNAL CAROTID ARTERY: ICD-10-CM

## 2021-02-08 DIAGNOSIS — G45.9 TIA (TRANSIENT ISCHEMIC ATTACK): ICD-10-CM

## 2021-02-08 DIAGNOSIS — M79.7 FIBROMYALGIA: Primary | ICD-10-CM

## 2021-02-08 DIAGNOSIS — R41.3 DISTURBANCE OF MEMORY: ICD-10-CM

## 2021-02-08 PROCEDURE — 99214 OFFICE O/P EST MOD 30 MIN: CPT | Performed by: PSYCHIATRY & NEUROLOGY

## 2021-02-08 NOTE — LETTER
2/8/2021 7:55 PM 
 
Patient:  Sandra Varma. YOB: 1960 Date of Visit: 2/8/2021 Dear No Recipients: Thank you for referring Mr. Yajaira Knox to me for evaluation/treatment. Below are the relevant portions of my assessment and plan of care. Consult Sandra Ferguson is a 61 y.o. male who was seen by synchronous (real-time) audio-video technology on 2/8/2021. REFERRED BY: 
Meme Knight MD 
 
CHIEF COMPLAINT: History of stroke Subjective:  
 
Sandra Ferguson is a 61 y.o. right-handed  male, seen for evaluation at the request of Dr. Mk Bartholomew for evaluation of new problem of progressive memory loss over the last several months that seems to be clinically significant according to the wife and the patient. The memory loss seems to be more recent memory, and he has been slowly getting worse over the last year. Patient denies any new focal weakness or sensory loss, any head trauma, any fever or meningismus, or any toxin exposure, or any other cause for this. The patient does have a past history of a stroke in September 2019 when he had left-sided numbness but made almost a complete recovery. At that time his CTA of the head neck was normal CT of the head was normal and his MRI scan was normal and this was thought to be more of a TIA than stroke. He is taking aspirin and Pravachol for treatment of this, and blood pressure medication and that seems to be fairly well controlled on his medications and being monitored by his PCP. We advised the patient not the main risk factors for stroke are high blood pressure, cholesterol, smoking, and he should quit smoking, and that diabetes. Patient said he is try to wean down but still smoking some. He has no family history of memory loss. His problems seem significant and his wife is considerably concerned about it. Past Medical History:  
Diagnosis Date  Chronic insomnia 8/16/2018  Colon polyps 8/14/2018  Crohn's disease ([facility] [address] 8/14/2018  Depression 8/14/2018  Diverticulosis 8/14/2018  Fibromyalgia 8/14/2018  Gastroesophageal reflux disease without esophagitis 8/16/2018  IBS (irritable bowel syndrome) 8/14/2018  Onychomycosis 8/14/2018  Reactive arthritis ([facility] [address] 8/14/2018  Sarcoidosis 8/14/2018 Past Surgical History:  
Procedure Laterality Date  HX ORCHIECTOMY Left  HX ORTHOPAEDIC    
 L3-5 lumbar surgery  HX TONSILLECTOMY Family History Problem Relation Age of Onset  Hypertension Mother  Hypertension Father  Prostate Cancer Father  Stroke Father  Hypertension Brother  Hypertension Paternal Grandfather  Colon Cancer Paternal Grandfather  Colon Cancer Paternal Uncle  No Known Problems Child Social History Tobacco Use  Smoking status: Current Every Day Smoker Packs/day: 1.50  Smokeless tobacco: Never Used Substance Use Topics  Alcohol use: No  
   
 
Current Outpatient Medications:  
  lisinopriL (PRINIVIL, ZESTRIL) 20 mg tablet, TAKE 1 TABLET BY MOUTH EVERY DAY, Disp: 90 Tab, Rfl: 1   pravastatin (PRAVACHOL) 40 mg tablet, TAKE 1 TABLET BY MOUTH EVERY DAY AT NIGHT, Disp: 90 Tab, Rfl: 1 
  aspirin 81 mg chewable tablet, TAKE 1 TABLET BY MOUTH EVERY DAY, Disp: 90 Tab, Rfl: 1   DULoxetine (CYMBALTA) 60 mg capsule, TAKE 1 CAPSULE BY MOUTH EVERY DAY, Disp: 90 Cap, Rfl: 3 
  zolpidem CR (AMBIEN CR) 12.5 mg tablet, TAKE 1 TABLET BY MOUTH AT BEDTIME, Disp: , Rfl: 3 
  Dexlansoprazole (DEXILANT) 60 mg CpDB, Take  by mouth daily. , Disp: , Rfl:  
  traZODone (DESYREL) 50 mg tablet, Take  by mouth nightly., Disp: , Rfl:  
 
 
 
Allergies Allergen Reactions  Remicade [Infliximab] Other (comments) MRI Results (most recent): 
Results from Hospital Encounter encounter on 09/21/19 MRI BRAIN WO CONT Narrative EXAM:  MRI BRAIN WO CONT INDICATION:  Workup for TIA, patient presented to the ED with acute onset 
multiple episodes left-sided numbness on the day of arrival lasting short 
periods of time. Upon arrival symptoms had resolved. TECHNIQUE: Sagittal T1, axial FLAIR, T2,T1 and gradient echo images as well as 
coronal T2 weighted images and axial diffusion weighted images of the head were 
obtained. COMPARISON:  CT, CTA. FINDINGS: 
The ventricular size and configuration are normal.  
Scattered tiny punctate foci of T2 hyperintensity in the cerebral white matter 
without associated restricted diffusion. Nonspecific pattern and possibly 
chronic. Otherwise normal signal in the brain. No evidence of hemorrhage, mass, infarct or abnormal extra-axial fluid 
collections. Flow voids are present in the vertebral basilar and carotid artery systems. The craniocervical junction is unremarkable. Mild mucosal thickening paranasal sinuses. Impression IMPRESSION: No acute intracranial abnormality demonstrated. Results from [facility] Encounter encounter on 09/21/19 MRI BRAIN WO CONT Narrative EXAM:  MRI BRAIN WO CONT INDICATION:  Workup for TIA, patient presented to the ED with acute onset 
multiple episodes left-sided numbness on the day of arrival lasting short 
periods of time. Upon arrival symptoms had resolved. TECHNIQUE: Sagittal T1, axial FLAIR, T2,T1 and gradient echo images as well as 
coronal T2 weighted images and axial diffusion weighted images of the head were 
obtained. COMPARISON:  CT, CTA. FINDINGS: 
The ventricular size and configuration are normal.  
Scattered tiny punctate foci of T2 hyperintensity in the cerebral white matter 
without associated restricted diffusion. Nonspecific pattern and possibly 
chronic. Otherwise normal signal in the brain. No evidence of hemorrhage, mass, infarct or abnormal extra-axial fluid 
collections. Flow voids are present in the vertebral basilar and carotid artery systems. The craniocervical junction is unremarkable. Mild mucosal thickening paranasal sinuses. Impression IMPRESSION: No acute intracranial abnormality demonstrated. Review of Systems: A comprehensive review of systems was negative except for: Musculoskeletal: positive for myalgias, arthralgias and stiff joints Neurological: positive for memory problems Behvioral/Psych: positive for Memory loss There were no vitals filed for this visit. Objective: I 
 
 
NEUROLOGICAL EXAM: 
 
Appearance: The patient is well developed, well nourished, provides a fair history and is in no acute distress. Mental Status: Oriented to time, place and person, and the president, patient cannot do serial sevens well, could not spell world backward, can remember only 1 of 3 words at 30 seconds with distraction. Cognitive function is abnormal and speech is fluent and no aphasia or dysarthria. Mood and affect appropriate. Cranial Nerves:   Intact visual fields. Fundi are not testable. SHIVAM, EOM's full, no nystagmus, no ptosis. Facial sensation is normal to touch. Corneal reflexes are not tested. Facial movement is symmetric. Hearing is normal bilaterally. Palate is midline with normal sternocleidomastoid and trapezius muscles are normal. Tongue is midline. Neck without meningismus Temporal arteries are not tender or enlarged TMJ areas are not tender on palpation Motor:  5/5 strength in upper and lower proximal and distal muscles. Normal bulk and tone. No fasciculations. Rapid alternating movement is symmetric and intact bilaterally Reflexes:   Deep tendon reflexes, Babinski and clonus could not be tested Sensory:   Normal to touch, pinprick and vibration and temperature and double simultaneous stimulation cannot be tested. Gait:  Normal gait for patient's age. Tremor:   No tremor noted. Cerebellar:  No abnormal cerebellar signs present on Romberg and tandem testing and finger-nose-finger exam.  
Neurovascular:  Cardiac examination and carotid examination and peripheral arterial examination could not be tested Assessment: ICD-10-CM ICD-9-CM 1. Fibromyalgia  M79.7 729.1 DUPLEX CAROTID BILATERAL  
   REFERRAL TO NEUROPSYCHOLOGY 2. Transient ischemic attack involving right internal carotid artery  G45.1 435.8 DUPLEX CAROTID BILATERAL  
   REFERRAL TO NEUROPSYCHOLOGY 3. TIA (transient ischemic attack)  G45.9 435.9 DUPLEX CAROTID BILATERAL  
   REFERRAL TO NEUROPSYCHOLOGY 4. Bilateral carotid artery stenosis  I65.23 433.10 DUPLEX CAROTID BILATERAL  
  433.30 REFERRAL TO NEUROPSYCHOLOGY 5. Disturbance of memory  R41.3 780.93 REFERRAL TO NEUROPSYCHOLOGY 6. B12 deficiency  E53.8 266.2 REFERRAL TO NEUROPSYCHOLOGY 7. Hypothyroidism due to acquired atrophy of thyroid  E03.4 244.8 REFERRAL TO NEUROPSYCHOLOGY  
  246.8 Active Problems: * No active hospital problems. * 
 
 
Plan:  
 
Patient with some evidence of mild memory loss, probably early Alzheimer's versus mild cognitive impairment, and less likely to be the normal memory loss of aging, so we will check carotid Dopplers and lab work for treatable causes of his dementia, she has not had a Doppler in several years, and if he does have dementia he may need reimaging of his brain. Right now has had no focal neurologic deficits to suggest structural brain lesion. Impression stay mentally and physically active, try to exercise regular, and to control his main risk factors for vascular disease as far as high blood pressure, cholesterol, diabetes, and smoking. If he does have a dementia he is facing is seriously disabling and progressively debilitating disease. We also advised him that he has to control his vascular risk factors because that can accentuate the memory loss of Alzheimer's. He will check my chart results of his test, or call us. Further treatment evaluation depends on the results of his testing and his clinical course. 38-minute spent with the patient, and overall of his history, going over his exam, going over his Mini-Mental status exam, and reviewing all his previous records and testing done looking for any treatable cause of his memory loss. Is a continue multivitamin of vitamin D every day in addition. Signed By: Mushtaq Sanchez MD   
 February 8, 2021 Pursuant to the emergency declaration under the [address] [place] [address], [facility] waiver authority and the [employer] Act, this Virtual  Visit was conducted, with patient's consent, to reduce the patient's risk of exposure to COVID-19 and provide continuity of care for an established patient. Services were provided through a video synchronous discussion virtually to substitute for in-person clinic visit. CC: Janine Velazquez MD 
FAX: [phone] If you have questions, please do not hesitate to call me. I look forward to following Mr. Patricia Rosas along with you. Sincerely, Mushtaq Sanchez MD

## 2021-02-09 NOTE — PROGRESS NOTES
Lynn Gosselin. is a 61 y.o. male who was seen by synchronous (real-time) audio-video technology on 2/8/2021. REFERRED BY:  Denis Hernandez MD    CHIEF COMPLAINT: History of stroke      Subjective:     Pooja Mcconnell is a 61 y.o. right-handed  male, seen for evaluation at the request of Dr. Valentino Burns for evaluation of new problem of progressive memory loss over the last several months that seems to be clinically significant according to the wife and the patient. The memory loss seems to be more recent memory, and he has been slowly getting worse over the last year. Patient denies any new focal weakness or sensory loss, any head trauma, any fever or meningismus, or any toxin exposure, or any other cause for this. The patient does have a past history of a stroke in September 2019 when he had left-sided numbness but made almost a complete recovery. At that time his CTA of the head neck was normal CT of the head was normal and his MRI scan was normal and this was thought to be more of a TIA than stroke. He is taking aspirin and Pravachol for treatment of this, and blood pressure medication and that seems to be fairly well controlled on his medications and being monitored by his PCP. We advised the patient not the main risk factors for stroke are high blood pressure, cholesterol, smoking, and he should quit smoking, and that diabetes. Patient said he is try to wean down but still smoking some. He has no family history of memory loss. His problems seem significant and his wife is considerably concerned about it.     Past Medical History:   Diagnosis Date    Chronic insomnia 8/16/2018    Colon polyps 8/14/2018    Crohn's disease ([facility] [address] 8/14/2018    Depression 8/14/2018    Diverticulosis 8/14/2018    Fibromyalgia 8/14/2018    Gastroesophageal reflux disease without esophagitis 8/16/2018    IBS (irritable bowel syndrome) 8/14/2018    Onychomycosis 8/14/2018    Reactive arthritis ([facility] [address] 8/14/2018    Sarcoidosis 8/14/2018      Past Surgical History:   Procedure Laterality Date    HX ORCHIECTOMY Left     HX ORTHOPAEDIC      L3-5 lumbar surgery    HX TONSILLECTOMY       Family History   Problem Relation Age of Onset    Hypertension Mother     Hypertension Father     Prostate Cancer Father     Stroke Father     Hypertension Brother     Hypertension Paternal Grandfather     Colon Cancer Paternal Grandfather     Colon Cancer Paternal Uncle     No Known Problems Child       Social History     Tobacco Use    Smoking status: Current Every Day Smoker     Packs/day: 1.50    Smokeless tobacco: Never Used   Substance Use Topics    Alcohol use: No         Current Outpatient Medications:     lisinopriL (PRINIVIL, ZESTRIL) 20 mg tablet, TAKE 1 TABLET BY MOUTH EVERY DAY, Disp: 90 Tab, Rfl: 1    pravastatin (PRAVACHOL) 40 mg tablet, TAKE 1 TABLET BY MOUTH EVERY DAY AT NIGHT, Disp: 90 Tab, Rfl: 1    aspirin 81 mg chewable tablet, TAKE 1 TABLET BY MOUTH EVERY DAY, Disp: 90 Tab, Rfl: 1    DULoxetine (CYMBALTA) 60 mg capsule, TAKE 1 CAPSULE BY MOUTH EVERY DAY, Disp: 90 Cap, Rfl: 3    zolpidem CR (AMBIEN CR) 12.5 mg tablet, TAKE 1 TABLET BY MOUTH AT BEDTIME, Disp: , Rfl: 3    Dexlansoprazole (DEXILANT) 60 mg CpDB, Take  by mouth daily. , Disp: , Rfl:     traZODone (DESYREL) 50 mg tablet, Take  by mouth nightly., Disp: , Rfl:         Allergies   Allergen Reactions    Remicade [Infliximab] Other (comments)      MRI Results (most recent):  Results from Hospital Encounter encounter on 09/21/19   MRI BRAIN WO CONT    Narrative EXAM:  MRI BRAIN WO CONT  INDICATION:  Workup for TIA, patient presented to the ED with acute onset  multiple episodes left-sided numbness on the day of arrival lasting short  periods of time. Upon arrival symptoms had resolved.   TECHNIQUE: Sagittal T1, axial FLAIR, T2,T1 and gradient echo images as well as  coronal T2 weighted images and axial diffusion weighted images of the head were  obtained. COMPARISON:  CT, CTA. FINDINGS:  The ventricular size and configuration are normal.   Scattered tiny punctate foci of T2 hyperintensity in the cerebral white matter  without associated restricted diffusion. Nonspecific pattern and possibly  chronic. Otherwise normal signal in the brain. No evidence of hemorrhage, mass, infarct or abnormal extra-axial fluid  collections. Flow voids are present in the vertebral basilar and carotid artery systems. The craniocervical junction is unremarkable. Mild mucosal thickening paranasal sinuses. Impression IMPRESSION: No acute intracranial abnormality demonstrated. Results from [place] encounter on 09/21/19   MRI BRAIN WO CONT    Narrative EXAM:  MRI BRAIN WO CONT  INDICATION:  Workup for TIA, patient presented to the ED with acute onset  multiple episodes left-sided numbness on the day of arrival lasting short  periods of time. Upon arrival symptoms had resolved. TECHNIQUE: Sagittal T1, axial FLAIR, T2,T1 and gradient echo images as well as  coronal T2 weighted images and axial diffusion weighted images of the head were  obtained. COMPARISON:  CT, CTA. FINDINGS:  The ventricular size and configuration are normal.   Scattered tiny punctate foci of T2 hyperintensity in the cerebral white matter  without associated restricted diffusion. Nonspecific pattern and possibly  chronic. Otherwise normal signal in the brain. No evidence of hemorrhage, mass, infarct or abnormal extra-axial fluid  collections. Flow voids are present in the vertebral basilar and carotid artery systems. The craniocervical junction is unremarkable. Mild mucosal thickening paranasal sinuses. Impression IMPRESSION: No acute intracranial abnormality demonstrated.        Review of Systems:  A comprehensive review of systems was negative except for: Musculoskeletal: positive for myalgias, arthralgias and stiff joints  Neurological: positive for memory problems  Behvioral/Psych: positive for Memory loss   There were no vitals filed for this visit. Objective:     I      NEUROLOGICAL EXAM:    Appearance: The patient is well developed, well nourished, provides a fair history and is in no acute distress. Mental Status: Oriented to time, place and person, and the president, patient cannot do serial sevens well, could not spell world backward, can remember only 1 of 3 words at 30 seconds with distraction. Cognitive function is abnormal and speech is fluent and no aphasia or dysarthria. Mood and affect appropriate. Cranial Nerves:   Intact visual fields. Fundi are not testable. SHIVAM, EOM's full, no nystagmus, no ptosis. Facial sensation is normal to touch. Corneal reflexes are not tested. Facial movement is symmetric. Hearing is normal bilaterally. Palate is midline with normal sternocleidomastoid and trapezius muscles are normal. Tongue is midline. Neck without meningismus   Temporal arteries are not tender or enlarged  TMJ areas are not tender on palpation   Motor:  5/5 strength in upper and lower proximal and distal muscles. Normal bulk and tone. No fasciculations. Rapid alternating movement is symmetric and intact bilaterally   Reflexes:   Deep tendon reflexes, Babinski and clonus could not be tested   Sensory:   Normal to touch, pinprick and vibration and temperature and double simultaneous stimulation cannot be tested. Gait:  Normal gait for patient's age. Tremor:   No tremor noted. Cerebellar:  No abnormal cerebellar signs present on Romberg and tandem testing and finger-nose-finger exam.   Neurovascular:  Cardiac examination and carotid examination and peripheral arterial examination could not be tested           Assessment:       ICD-10-CM ICD-9-CM    1. Fibromyalgia  M79.7 729.1 DUPLEX CAROTID BILATERAL      REFERRAL TO NEUROPSYCHOLOGY   2.  Transient ischemic attack involving right internal carotid artery  G45.1 435.8 DUPLEX CAROTID BILATERAL      REFERRAL TO NEUROPSYCHOLOGY   3. TIA (transient ischemic attack)  G45.9 435.9 DUPLEX CAROTID BILATERAL      REFERRAL TO NEUROPSYCHOLOGY   4. Bilateral carotid artery stenosis  I65.23 433.10 DUPLEX CAROTID BILATERAL     433.30 REFERRAL TO NEUROPSYCHOLOGY   5. Disturbance of memory  R41.3 780.93 REFERRAL TO NEUROPSYCHOLOGY   6. B12 deficiency  E53.8 266.2 REFERRAL TO NEUROPSYCHOLOGY   7. Hypothyroidism due to acquired atrophy of thyroid  E03.4 244.8 REFERRAL TO NEUROPSYCHOLOGY     246.8      Active Problems:    * No active hospital problems. *      Plan:     Patient with some evidence of mild memory loss, probably early Alzheimer's versus mild cognitive impairment, and less likely to be the normal memory loss of aging, so we will check carotid Dopplers and lab work for treatable causes of his dementia, she has not had a Doppler in several years, and if he does have dementia he may need reimaging of his brain. Right now has had no focal neurologic deficits to suggest structural brain lesion. Impression stay mentally and physically active, try to exercise regular, and to control his main risk factors for vascular disease as far as high blood pressure, cholesterol, diabetes, and smoking. If he does have a dementia he is facing is seriously disabling and progressively debilitating disease. We also advised him that he has to control his vascular risk factors because that can accentuate the memory loss of Alzheimer's. He will check my chart results of his test, or call us. Further treatment evaluation depends on the results of his testing and his clinical course. 38-minute spent with the patient, and overall of his history, going over his exam, going over his Mini-Mental status exam, and reviewing all his previous records and testing done looking for any treatable cause of his memory loss. Is a continue multivitamin of vitamin D every day in addition.     Signed By: Janna Matute MD     February 8, 2021       Pursuant to the emergency declaration under the [address] [place] [address], [facility] waiver authority and the [employer] Act, this Virtual  Visit was conducted, with patient's consent, to reduce the patient's risk of exposure to COVID-19 and provide continuity of care for an established patient. Services were provided through a video synchronous discussion virtually to substitute for in-person clinic visit.         CC: Lexi Dupont MD  FAX: [phone]

## 2021-02-12 DIAGNOSIS — G45.9 TIA (TRANSIENT ISCHEMIC ATTACK): Primary | ICD-10-CM

## 2021-02-15 DIAGNOSIS — I10 ESSENTIAL HYPERTENSION: Chronic | ICD-10-CM

## 2021-02-15 RX ORDER — GUAIFENESIN 100 MG/5ML
LIQUID (ML) ORAL
Qty: 90 TAB | Refills: 1 | Status: SHIPPED | OUTPATIENT
Start: 2021-02-15 | End: 2021-08-13

## 2021-02-15 RX ORDER — PRAVASTATIN SODIUM 40 MG/1
TABLET ORAL
Qty: 90 TAB | Refills: 1 | Status: SHIPPED | OUTPATIENT
Start: 2021-02-15 | End: 2021-08-17

## 2021-02-15 RX ORDER — LISINOPRIL 20 MG/1
TABLET ORAL
Qty: 90 TAB | Refills: 1 | Status: SHIPPED | OUTPATIENT
Start: 2021-02-15 | End: 2021-08-17

## 2021-03-02 ENCOUNTER — OFFICE VISIT (OUTPATIENT)
Dept: INTERNAL MEDICINE CLINIC | Age: 61
End: 2021-03-02
Payer: COMMERCIAL

## 2021-03-02 VITALS
OXYGEN SATURATION: 98 % | TEMPERATURE: 97.5 F | RESPIRATION RATE: 18 BRPM | BODY MASS INDEX: 27.01 KG/M2 | HEIGHT: 72 IN | DIASTOLIC BLOOD PRESSURE: 82 MMHG | WEIGHT: 199.4 LBS | HEART RATE: 70 BPM | SYSTOLIC BLOOD PRESSURE: 138 MMHG

## 2021-03-02 DIAGNOSIS — M02.30 REACTIVE ARTHRITIS (HCC): Chronic | ICD-10-CM

## 2021-03-02 DIAGNOSIS — Z79.899 LONG-TERM USE OF HIGH-RISK MEDICATION: Chronic | ICD-10-CM

## 2021-03-02 DIAGNOSIS — F51.04 CHRONIC INSOMNIA: Chronic | ICD-10-CM

## 2021-03-02 DIAGNOSIS — K50.919 CROHN'S DISEASE WITH COMPLICATION, UNSPECIFIED GASTROINTESTINAL TRACT LOCATION (HCC): Chronic | ICD-10-CM

## 2021-03-02 DIAGNOSIS — F33.0 DEPRESSION, MAJOR, RECURRENT, MILD (HCC): ICD-10-CM

## 2021-03-02 DIAGNOSIS — E78.00 HYPERCHOLESTEROLEMIA: Chronic | ICD-10-CM

## 2021-03-02 DIAGNOSIS — K21.9 GASTROESOPHAGEAL REFLUX DISEASE WITHOUT ESOPHAGITIS: Chronic | ICD-10-CM

## 2021-03-02 DIAGNOSIS — I10 ESSENTIAL HYPERTENSION: Primary | Chronic | ICD-10-CM

## 2021-03-02 PROCEDURE — 99214 OFFICE O/P EST MOD 30 MIN: CPT | Performed by: INTERNAL MEDICINE

## 2021-03-02 NOTE — PROGRESS NOTES
Subjective:     Amy Gaitan presents to the office today accompanied by his wife in follow-up of multiple medical problems. The patient has hypercholesterolemia currently on statin therapy. He is tolerating this without muscle soreness or GI upset. He has no history of coronary artery disease and denies exertional chest pains or claudication. His hypertension is currently managed on lisinopril. He denies any cough, orthostatic dizziness. He has had a previous history of TIA and remains on a daily aspirin. He denies any headaches, numbness, tingling or focal neurological problems. Depression is currently managed on Cymbalta. He has been doing fairly well with this through the pandemic. He has had no exacerbation of symptoms or long-term side effects related to his medication. Redell López is being used to manage his GERD. He denies breakthrough heartburn and has had no dysphagia, early satiety or unexplained weight loss. He has chronic insomnia and does take Ambien and trazodone for this. .the patient notes that he is able to sleep well at night and denies any daytime somnolence. He has Crohn's disease which is been flaring up slightly during the course of the pandemic. He also has a history of reactive arthritis which is somewhat chronic but without recent flare. He has been followed recently by Dr. Malu Peralta for memory loss. The patient believes that he had Covid last year and then again in January when his entire family came with Covid. Wife was ill with it as well. Since that time he has noted problems with brain fog and memory loss for which she has been worked up at the present time. In addition he remains on an anticholinergic medication and Ambien for sleep.     Past Medical History:   Diagnosis Date    Chronic insomnia 8/16/2018    Colon polyps 8/14/2018    Crohn's disease ([facility] [address] 8/14/2018    Depression 8/14/2018    Diverticulosis 8/14/2018    Fibromyalgia 8/14/2018    Gastroesophageal reflux disease without esophagitis 8/16/2018    IBS (irritable bowel syndrome) 8/14/2018    Onychomycosis 8/14/2018    Reactive arthritis ([address] 8/14/2018    Sarcoidosis 8/14/2018     Past Surgical History:   Procedure Laterality Date    HX ORCHIECTOMY Left     HX ORTHOPAEDIC      L3-5 lumbar surgery    HX TONSILLECTOMY       Allergies   Allergen Reactions    Remicade [Infliximab] Other (comments)     Current Outpatient Medications   Medication Sig Dispense Refill    pravastatin (PRAVACHOL) 40 mg tablet TAKE 1 TABLET BY MOUTH EVERY DAY AT NIGHT 90 Tab 1    lisinopriL (PRINIVIL, ZESTRIL) 20 mg tablet TAKE 1 TABLET BY MOUTH EVERY DAY 90 Tab 1    aspirin 81 mg chewable tablet TAKE 1 TABLET BY MOUTH EVERY DAY 90 Tab 1    DULoxetine (CYMBALTA) 60 mg capsule TAKE 1 CAPSULE BY MOUTH EVERY DAY 90 Cap 3    zolpidem CR (AMBIEN CR) 12.5 mg tablet TAKE 1 TABLET BY MOUTH AT BEDTIME  3    Dexlansoprazole (DEXILANT) 60 mg CpDB Take  by mouth daily.  traZODone (DESYREL) 50 mg tablet Take  by mouth nightly. Social History     Socioeconomic History    Marital status:      Spouse name: Not on file    Number of children: 2    Years of education: Not on file    Highest education level: Not on file   Occupational History    Occupation: refinishes furniture   Tobacco Use    Smoking status: Current Every Day Smoker     Packs/day: 1.50    Smokeless tobacco: Never Used   Substance and Sexual Activity    Alcohol use: No    Drug use: Never     Family History   Problem Relation Age of Onset    Hypertension Mother     Hypertension Father     Prostate Cancer Father     Stroke Father     Hypertension Brother     Hypertension Paternal Grandfather     Colon Cancer Paternal Grandfather     Colon Cancer Paternal Uncle     No Known Problems Child        Review of Systems:  GEN: no weight loss, weight gain, fatigue or night sweats  CV: no PND, orthopnea, or palpitations.   Notes occasional tachycardia with activity since having COVID-19  Resp: no dyspnea on exertion, no cough. Positive for mild shortness of breath since Covid19  Abd: no nausea, vomiting or diarrhea  EXT: denies edema, claudication  Endocrine: no hair loss, excessive thirst or polyuria  Neurological ROS: no TIA or stroke symptoms. Positive for short-term memory loss  ROS otherwise negative      Objective:     Visit Vitals  /82 (BP 1 Location: Right upper arm, BP Patient Position: Sitting, BP Cuff Size: Adult)   Pulse 70   Temp 97.5 °F (36.4 °C) (Oral)   Resp 18   Ht 6' (1.829 m)   Wt 199 lb 6.4 oz (90.4 kg)   SpO2 98%   BMI 27.04 kg/m²     Body mass index is 27.04 kg/m². General:   alert, cooperative and no distress   Eyes: conjunctivae/sclerae clear. PERRL, EOM's intact   Mouth:  No oral lesions, no pharyngeal erythema, no exudates   Neck: Trachea midline, no thyromegaly, no bruits   Heart: S1 and S2 normal,no murmurs noted    Lungs: Clear to auscultation bilaterally, no increased work of breathing   Abdomen: Soft, nontender. Normal bowel sounds   Extremities: No edema or cyanosis   Neuro: ..alert, oriented x3,speech normal in context and clarity, cranial nerves II-XII intact,motor strength: full proximally and distally,gait: normal  reflexes: full and symmetric     Physical exam otherwise negative         Assessment/Plan:     Diagnoses and all orders for this visit:    Essential hypertension    Hypercholesterolemia    Long-term use of high-risk medication    Chronic insomnia    Gastroesophageal reflux disease without esophagitis    Crohn's disease with complication, unspecified gastrointestinal tract location ([address]    Reactive arthritis (HCC)    Depression, major, recurrent, mild ([address]        Other instructions: The patient's medications were reviewed and reconciled. No change in his current medical regimen will be made.     A no added salt, prudent diet is encouraged    Labs from 8/27 were reviewed with the patient today.    Continue to follow-up with Dr. Kate Hassan in regards to work-up of short-term memory loss however am concerned that some of his symptoms may be related to brain fog from [address] and effects of his current insomnia medications. Follow-up in 6 months    Follow-up and Dispositions    · Return in about 6 months (around 9/2/2021). Will Hopkins MD    Please note that this dictation was completed with [employer], the computer voice recognition software. Quite often unanticipated grammatical, syntax, homophones, and other interpretive errors are inadvertently transcribed by the computer software. Please disregard these errors. Please excuse any errors that have escaped final proofreading.

## 2021-03-02 NOTE — PROGRESS NOTES
Terrell Sheets. is a 61 y.o. male presenting for Follow Up Chronic Condition (6 mo fu)  . 1. Have you been to the ER, urgent care clinic since your last visit? Hospitalized since your last visit? No    2. Have you seen or consulted any other health care providers outside of the [address] [place] [address] since your last visit? Include any pap smears or colon screening. No    No flowsheet data found. Abuse Screening Questionnaire 3/2/2021   Do you ever feel afraid of your partner? N   Are you in a relationship with someone who physically or mentally threatens you? N   Is it safe for you to go home? Y       3 most recent PHQ Screens 3/2/2021   Little interest or pleasure in doing things Not at all   Feeling down, depressed, irritable, or hopeless Not at all   Total Score PHQ 2 0   Trouble falling or staying asleep, or sleeping too much Not at all   Feeling tired or having little energy Not at all   Poor appetite, weight loss, or overeating Not at all   Feeling bad about yourself - or that you are a failure or have let yourself or your family down Not at all   Trouble concentrating on things such as school, work, reading, or watching TV Not at all   Moving or speaking so slowly that other people could have noticed; or the opposite being so fidgety that others notice Not at all   Thoughts of being better off dead, or hurting yourself in some way Not at all   PHQ 9 Score 0   How difficult have these problems made it for you to do your work, take care of your home and get along with others Not difficult at all       There are no discontinued medications.

## 2021-03-02 NOTE — PATIENT INSTRUCTIONS
Crohn's Disease: Care Instructions Your Care Instructions Crohn's disease is a lifelong inflammatory bowel disease. Parts of the digestive tract get swollen and irritated and may develop sores called ulcers. It usually occurs in the last part of the small intestine and the first part of the large intestine. The main symptoms of Crohn's disease are belly pain, diarrhea, fever, and weight loss. It sometimes causes problems with joints, eyes, or skin. Symptoms may be mild or severe. The disease can also go into remission (not be active). Bad attacks are often treated in the hospital with medicines and liquids through a tube in your vein (IV). Talk with your doctor about the best treatments for you. You may need medicines that help prevent or treat flare-ups. You may need surgery to remove part of your bowel if you have an abnormal opening in the bowel (fistula), an abscess, or an obstruction. Self-care can help reduce your symptoms. Follow-up care is a key part of your treatment and safety. Be sure to make and go to all appointments, and call your doctor if you are having problems. It's also a good idea to know your test results and keep a list of the medicines you take. How can you care for yourself at home? · Take your medicines exactly as prescribed. Call your doctor if you think you are having a problem with your medicine. You will get more details on the specific medicines your doctor prescribes. · Do not take anti-inflammatory medicines, such as aspirin, ibuprofen (Advil, Motrin), or naproxen (Aleve). They may make your symptoms worse. Do not take any other medicines or herbal products without talking to your doctor first. 
· Avoid foods that make your symptoms worse. These might include milk, alcohol, high-fiber foods, or spicy foods. · Eat a healthy diet. Make sure to get enough iron. Rectal bleeding may make you lose iron. Good sources of iron include beef, lentils, spinach, raisins, and iron-enriched breads and cereals. · Drink liquid meal replacements if your doctor recommends them. These are high in calories and contain vitamins and minerals. Severe symptoms may make it hard for your body to absorb vitamins and minerals. · Do not smoke. Smoking makes Crohn's disease worse. If you need help quitting, talk to your doctor about stop-smoking programs and medicines. These can increase your chances of quitting for good. · Seek support from friends and family to help cope with Crohn's disease. The illness can affect all parts of your life. Get counseling if you need it. When should you call for help? Call 911 anytime you think you may need emergency care. For example, call if: 
  · Your stools are maroon or very bloody.  
  · You passed out (lost consciousness). Call your doctor now or seek immediate medical care if: 
  · You are vomiting.  
  · You have new or worse belly pain.  
  · You have a fever.  
  · You cannot pass stools or gas.  
  · You have new or more blood in your stools. Watch closely for changes in your health, and be sure to contact your doctor if: 
  · You have new or worse symptoms.  
  · You are losing weight.  
  · You do not get better as expected. Where can you learn more? Go to http://www.gray.com/ Enter 21 [phone] in the search box to learn more about \"Crohn's Disease: Care Instructions. \" Current as of: April 15, 2020               Content Version: 12.6 © 1900-5732 [employer]. Care instructions adapted under license by [employer] (which disclaims liability or warranty for this information). If you have questions about a medical condition or this instruction, always ask your healthcare professional. [address] any warranty or liability for your use of this information.

## 2021-03-18 ENCOUNTER — OFFICE VISIT (OUTPATIENT)
Dept: NEUROLOGY | Age: 61
End: 2021-03-18
Payer: COMMERCIAL

## 2021-03-18 ENCOUNTER — OFFICE VISIT (OUTPATIENT)
Dept: NEUROLOGY | Age: 61
End: 2021-03-18

## 2021-03-18 DIAGNOSIS — G31.84 MILD COGNITIVE IMPAIRMENT WITH MEMORY LOSS: Primary | ICD-10-CM

## 2021-03-18 PROCEDURE — 96132 NRPSYC TST EVAL PHYS/QHP 1ST: CPT | Performed by: PSYCHOLOGIST

## 2021-03-18 PROCEDURE — 96133 NRPSYC TST EVAL PHYS/QHP EA: CPT | Performed by: PSYCHOLOGIST

## 2021-03-18 PROCEDURE — 96136 PSYCL/NRPSYC TST PHY/QHP 1ST: CPT | Performed by: PSYCHOLOGIST

## 2021-03-18 PROCEDURE — 96139 PSYCL/NRPSYC TST TECH EA: CPT | Performed by: PSYCHOLOGIST

## 2021-03-18 PROCEDURE — 96116 NUBHVL XM PHYS/QHP 1ST HR: CPT | Performed by: PSYCHOLOGIST

## 2021-03-18 PROCEDURE — 96138 PSYCL/NRPSYC TECH 1ST: CPT | Performed by: PSYCHOLOGIST

## 2021-03-18 PROCEDURE — 96137 PSYCL/NRPSYC TST PHY/QHP EA: CPT | Performed by: PSYCHOLOGIST

## 2021-03-18 NOTE — PROGRESS NOTES
This note will not be viewable in [employer] for the following reason(s). Likely risk of substantial harm from the misinterpretation of the data generated by this evaluation. [facility] at [address] [place] [address]    Office:  [phone]  Fax: [phone]                                         Neuropsychological Evaluation Report    Patient Name: Rodrigo Sen. Age: 61 y.o. Gender: male   Occupation:   Handedness: left handed   Presenting Concern: Memory Decline  Primary Care Physician: Susan Romo MD  Referring Provider: Jose R Sheth MD    PATIENT HISTORY (OBTAINED DURING INITIAL CLINICAL EVALUATION):    REASON FOR REFERRAL:  This comprehensive and medically necessary neuropsychological assessment was requested to assist with a differential diagnosis of memory loss. The use and purpose of this examination, as well as the extent and limitations of confidentiality, were explained prior to obtaining permission to participate. Instructions were provided regarding the necessity to put forth optimal effort and answer questions truthfully in order to obtain reliable and accurate test results.     PERTINENT HISTORY:  Mr. Gypsy Mo presented for a neuropsychological assessment at the recommendation of his treating physician secondary to complaints of memory loss, decreased concentration, poor sleep, anxiousness, feeling slowed down, irritability, easily frustrated, and less agile with technology. Mr. Gypsy Mo began noticing symptoms following a TIA in 2019, and two bouts of COVID-19 virus in 2019 and 2021. His TIA was characterized by numbness down his left side (face, upper, and lower extremities). Familial history of strokes on both sides. From a brief review of his medical and personal history there has not been any other significant neurological injury or illness noted or reported.   He did report experiencing depression and anxiety in the past.  He has an aunt with cognitive decline/dementia.     Mr. Villarreal does not  report any problems at birth or difficulties meeting developmental milestones. He reports that he had an adequate level of family support and was not subject to trauma or abuse as a child.  Mr. Villarreal does not  report being retain in school or receiving special assistance in any of he classes or subjects. Mr. Villarreal completed 12 years of education. He was not a strong student.     Mr. Villarreal does  exercise on a regular basis and does  maintain a balanced diet.  He does  report problems with sleep and does  complain of pain.   He does  participate in mentally stimulating activities.  Mr. Villarreal does  have concerns regarding active pets, currentl events, place of residence, or financial stressors.  Mr. Villarreal indicated that he is independent in his instrumental activities of daily living, including shopping, meal preparation, housekeeping, doing laundry, driving a car, managing medications, and finances.    METHODS OF ASSESSMENT (Current Evaluation):  Clinician Administered:  Clinical Interview  Review of Medical Records  Clock Drawing Task  Modified Mini-Mental Status Exam (3MS)  Test of Premorbid Functioning  State-Trait Anxiety Inventory (STAX-I)  Ordonez Depression Inventory  Revised Memory and Behavior Checklist    Technician Administered:        CNS Vital Signs  Neuropsychological Assessment Battery   NAB: Attention Module   NAB: Executive Functions Module  RBANS-A  Test of Memory Malingering  Test of Practical Judgment (9-Item)  Trail Making Test  Wide Range Achievement Test-5    TEST OBSERVATIONS:  Mr. Villarreal arrived promptly for the testing session.  Dress and grooming were appropriate; physical presentation was unchanged from that observed during the clinical interview.  Speech was fluent, intelligible, and goal-directed.  Affect was congruent with the euthymic mood conveyed. Mr. Villarreal was  adequately cooperative and appeared to put forth good effort throughout this examination. Rapport with the examiner was adequately established and maintained. Minimal prompting was required. Comprehension of test instructions was not problematic. Performance motivation was objectively measured by three instruments (TOMM, RBANS EI, Reliable Digit Span), and Mr. Manuel Lowry produced a normal score on three of these measures. Accordingly, test findings below do not appear to be the product of disingenuous effort. Given the above observations, plus comments contained in the Mental Status section, the results of this examination are regarded as reasonably reliable and valid. TEST RESULTS:  Quantitative test results are derived from comparisons to age and education corrected cohort normative data, where applicable. Percentiles are included in these instances. Qualitative test results are determined using clinical observations. General Orientation and Awareness:       Orientation to person, year, month, day of week, state, town, and circumstance.    Awareness of deficits: WNL                  Cognitive performance validity testing:  Valid        Attention/Concentration:      Classification:  Coding (12 percentile)           Low Average  Simple visuomotor tracking (5 percentile)               Mildly Impaired  Digit span (45 percentile)                      Average  Digits backward (12 percentile)                 Low Average  Visual scanning (34 percentile)                            Average  Simple information processing  efficiency (42 percentile)  Average   Complex information processing efficiency (21 percentile)  Low Average  Attention to visual detail of driving scenes (69 percentile)             Average    CNS-VS (ADHD) Summary Table  Domain Standard Score Percentile Valid Performance   Complex Attention 82 12 Yes Low Average   Cognitive Flexibility 71 3 Yes Low   Executive Functioning 70 2 Yes Low Simple Attention 99 47 Yes Average     Visuospatial and Constructional Praxis:     Figure copy (0.1 percentile)                                      Severely Impaired  Line orientation (5 percentile)                                                Mildly Impaired     Language:         Picture naming (73 percentile)                               Average  Semantic fluency (19 percentile)                    Low Average    Memory and Learning:       Immediate word list learning (1 percentile)               Severely Impaired  Delayed word list recall (8 percentile)                  Mildly Impaired  Delayed word list recognition (70 percentile)               Average  Immediate story memory (0.5 percentile)           Severely Impaired  Delayed story recall (5 percentile)                Mildly Impaired  Delayed figure recall (19 percentile)                Low Average    Cognitive Tests of Executive Functioning:     Trail Making B (<0.1 percentile)                      Severely Impaired  Mazes (24 percentile)                   Low Average  Simple judgment in daily decision making (62 percentile)              Average  Complex judgment in daily decision making (50 percentile)               Average  Categories (38 percentile)                  Average  Word generation (10 percentile)                   Low Average      Wide Range Achievement Test        SS       Percentile   Word Readin   11   Sentence Comprehension:  81   11   Spellin    8   Math Computation:   82   12   Reading Composite:   79    8    Intellectual and Basic Cognitive Functioning (WAIS-IV):  ACS Test of pre-morbid functioning reading recognition lower limit estimated WAIS-IV FSIQ score:       Estimated full scale IQ:              81     9 percentile       Low Average      Emotional Functioning:   BDI-2:  5    Normal range   STAXI:  36 Normal range    IMPRESSIONS:  Mr. Angel Rollins was seen for a comprehensive neuropsychological evaluation and administered a battery of measures assessing the neurocognitive domains of attention, visual-spatial, language, memory, and executive functioning. His overall performance averaged across the 5 domains assessed yielded a total score in the mildly impaired range. His individual domain scores ranged from severely impaired to average. His performance on the language domain (in the average range) represents a relative strength for Mr. Villarreal. In sharp contrast, his visual-spatial skills were severely impaired, representing a relative weakness for him. His performance on the attention domain and executive functioning domain were both in the low average range. His performance on memory functioning yielded a score in the mildly impaired range. His performance on the CNS vital signs (a computerized program indicated average to low average performance on attention measures, and moderate impairment on measures of executive functioning. On traditional paper and pencil measures of attention and concentration his performance on individual measures were in the low average to average range with the exception of his performance on a visual motor tracking task. Other areas of weakness on individual measures were noted on tasks of constructional praxis, visual perceptual ability, immediate word list learning, delayed word list recall, immediate story memory learning and delayed story recall, and cognitive flexibility. Based on his performance on a measure of academic achievement and word recognition his premorbid level of functioning is estimated to be in the low average range. He reported no significant level of emotional distress at the time of the evaluation. In summary,  [facility] current level of neurocognitive functioning is consistent with a mild cognitive impairment with memory loss, representing subtle reduction in performance secondary to his reported TIA.  Other factors contributing to his decline include his insomnia, and chronic pain. DIAGNOSTIC IMPRESSIONS:    ICD-10-CM ICD-9-CM    1. Mild cognitive impairment with memory loss  G31.84 331.83 MS NEUROPSYCHOLOGICAL TST EVAL PHYS/QHP 1ST HOUR      MS NEUROPSYCHOLOGICAL TST EVAL PHYS/QHP EA ADDL HR      MS PSYL/NRPSYCL TST PHYS/QHP 2+ TST 1ST 30 MIN      MS PSYCL/NRPSYCL TST PHYS/QHP 2+ TST EA ADDL 30 MIN      MS PSYCL/NRPSYCL TST TECH 2+ TST 1ST 30 MIN      MS PSYCL/NRPSYCL TST TECH 2+ TST EA ADDL 30 MIN      MS PSYCL/NRPSYCL TST TECH 2+ TST EA ADDL 30 MIN      MS PSYCL/NRPSYCL TST TECH 2+ TST EA ADDL 30 MIN      MS PSYCL/NRPSYCL TST TECH 2+ TST EA ADDL 30 MIN      MS PSYCL/NRPSYCL TST TECH 2+ TST EA ADDL 30 MIN         RECOMMENDATIONS:   1. Findings should be reviewed with Mr. Jaxson Ramirez to insure his understanding and discuss the potential implications. 2. Emphasis should be placed on Mr. Jaxson Ramirez obtaining good sleep hygiene and maintaining adequate physical exercise to promote good brain health. 3. Mr. Jaxson Ramirez may benefit from a referral to psychology to learn behavioral strategies in pain management. 4. Mr. Jaxson Ramirez is encouraged to seek out various forms of mental stimulation that would help to \"exercise\" his brain. 5. Mr. Jaxson Ramirez would benefit from engaging in meditation, yoga, and other stress management techniques. Thank you for allowing me the opportunity to assist you in Mr. Villarreal's care. Please do not hesitate to contact me should you have additional questions that I may not have addressed.     86981 x 1  96137 x 1  96138 x 1  96139 x 5  96132 x 1  96133 x 1          Keon Darling, Ph.D., ABPP  Licensed Clinical Psychologist  Neuropsychologist  Board Certified Rehabilitation Psychologist      Time Documentation:     21020 x 1: Neurobehavioral Status Exam/Clinical Interview: (1 hour, (already billed on first date of service)     33061*2 Neuropsych testing/data gathering by Neuropsychologist (35 additional minutes, see above)      96138 x 1  96139 x 5 Test Administration/Data Gathering By Technician: (3.5 hours). 02645 x 1 (first 30 minutes), 89582 x 7 (each additional 30 minutes)     96132 x 1  96133 x 1 Testing Evaluation Services by Neuropsychologist (1 hour, 50 minutes) 96132 x 1 (first hour), 96133 x 1 (50 minutes)     Definitions:       85272/78691:  Neurobehavioral Status Exam, Clinical interview. Clinical assessment of thinking, reasoning and judgment, by neuropsychologist, both face to face time with patient and time interpreting those test results and reporting, first and subsequent hours)     76468/21834: Neuropsychological Test Administration by Technician/Psychometrist, first 30 minutes and each additional 30 minutes. The above includes: Record review. Review of history provided by patient. Review of collaborative information. Testing by Clinician. Review of raw data. Scoring. Report writing of individual tests administered by Clinician. Integration of individual tests administered by psychometrist with NSE/testing by clinician, review of records/history/collaborative information, case Conceptualization, treatment planning, clinical decision making, report writing, coordination Of Care. Psychometry test codes as time spent by psychometrist administering and scoring neurocognitive/psychological tests under supervision of neuropsychologist.  Integral services including scoring of raw data, data interpretation, case conceptualization, report writing etcetera were initiated after the patient finished testing/raw data collected and was completed on the date the report was signed. This note will not be viewable in [address]

## 2021-03-18 NOTE — PROGRESS NOTES
This note will not be viewable in [employer] for the following reason(s). Likely risk of substantial harm from the misinterpretation of data generated by this evaluation. [address] [facility] at [address]    Office:  [phone]  Fax: [phone]                 Initial Office Exam    Patient Name: eJtt Nieto. Age: 61 y.o. Gender: male   Occupation:   Handedness: left handed   Presenting Concern: Memory Decline  Primary Care Physician: Antionette Almeida MD  Referring Provider: Marina Emerson MD      REASON FOR REFERRAL:  This comprehensive and medically necessary neuropsychological assessment was requested to assist with a differential diagnosis of memory loss. The use and purpose of this examination, as well as the extent and limitations of confidentiality, were explained prior to obtaining permission to participate. Instructions were provided regarding the necessity to put forth optimal effort and answer questions truthfully in order to obtain reliable and accurate test results. PERTINENT HISTORY:  Mr. Celestine Worley presented for a neuropsychological assessment at the recommendation of his treating physician secondary to complaints of memory loss, decreased concentration, poor sleep, anxiousness, feeling slowed down, irritability, easily frustrated, and less agile with technology. Mr. Celestine Worley began noticing symptoms following a TIA in 2019, and two bouts of COVID-19 virus in 2019 and 2021. His TIA was characterized by numbness down his left side (face, upper, and lower extremities). Familial history of strokes on both sides. From a brief review of his medical and personal history there has not been any other significant neurological injury or illness noted or reported. He did report experiencing depression and anxiety in the past.  He has an aunt with cognitive decline/dementia.      [employer] does not  report any problems at birth or difficulties meeting developmental milestones. He reports that he had an adequate level of family support and was not subject to trauma or abuse as a child. Mr. [employer] does not  report being retain in school or receiving special assistance in any of he classes or subjects. Mr. [employer] completed 12 years of education. He was not a strong student. Mr. [employer] does  exercise on a regular basis and does  maintain a balanced diet. He does  report problems with sleep and does  complain of pain. He does  participate in mentally stimulating activities. Mr. [employer] does  have concerns regarding active pets, currentl events, place of residence, or financial stressors. Mr. [employer] indicated that he is independent in his instrumental activities of daily living, including shopping, meal preparation, housekeeping, doing laundry, driving a car, managing medications, and finances. Current Outpatient Medications   Medication Sig    pravastatin (PRAVACHOL) 40 mg tablet TAKE 1 TABLET BY MOUTH EVERY DAY AT NIGHT    lisinopriL (PRINIVIL, ZESTRIL) 20 mg tablet TAKE 1 TABLET BY MOUTH EVERY DAY    aspirin 81 mg chewable tablet TAKE 1 TABLET BY MOUTH EVERY DAY    DULoxetine (CYMBALTA) 60 mg capsule TAKE 1 CAPSULE BY MOUTH EVERY DAY    zolpidem CR (AMBIEN CR) 12.5 mg tablet TAKE 1 TABLET BY MOUTH AT BEDTIME    Dexlansoprazole (DEXILANT) 60 mg CpDB Take  by mouth daily.  traZODone (DESYREL) 50 mg tablet Take  by mouth nightly. No current facility-administered medications for this visit.         Past Medical History:   Diagnosis Date    Chronic insomnia 8/16/2018    Colon polyps 8/14/2018    [facility]/14/2018    Depression 8/14/2018    Diverticulosis 8/14/2018    Fibromyalgia 8/14/2018    Gastroesophageal reflux disease without esophagitis 8/16/2018    IBS (irritable bowel syndrome) 8/14/2018    Onychomycosis 8/14/2018    Reactive arthritis ([facility] 8/14/2018  Sarcoidosis 8/14/2018       No flowsheet data found. PHQ 9 Score: 0 (3/2/2021  9:00 AM)    Past Surgical History:   Procedure Laterality Date    HX ORCHIECTOMY Left     HX ORTHOPAEDIC      L3-5 lumbar surgery    HX TONSILLECTOMY         Social History     Socioeconomic History    Marital status:      Spouse name: Not on file    Number of children: 2    Years of education: Not on file    Highest education level: Not on file   Occupational History    Occupation: [employer]   Tobacco Use    Smoking status: Current Every Day Smoker     Packs/day: 1.50    Smokeless tobacco: Never Used   Substance and Sexual Activity    Alcohol use: No    Drug use: Never       Family History   Problem Relation Age of Onset    Hypertension Mother     Hypertension Father     Prostate Cancer Father     Stroke Father     Hypertension Brother     Hypertension Paternal Grandfather     Colon Cancer Paternal Grandfather     Colon Cancer Paternal Uncle     No Known Problems Child        CT Results (most recent):  Results from Hospital Encounter encounter on 11/11/20   CT ABD PELV W CONT    Narrative EXAM: CT ABD PELV W CONT    INDICATION: Diarrhea    COMPARISON: None     CONTRAST: 100 mL of Isovue-370. TECHNIQUE:   Following the uneventful intravenous administration of contrast, thin axial  images were obtained through the abdomen and pelvis. Coronal and sagittal  reconstructions were generated. Oral contrast was administered. CT dose  reduction was achieved through use of a standardized protocol tailored for this  examination and automatic exposure control for dose modulation. FINDINGS:   LOWER THORAX: 4.5 mm right middle lobe pulmonary nodule. Paraseptal and  centrilobular emphysema. Lingular scarring. LIVER: Hepatic steatosis. BILIARY TREE: Gallbladder is within normal limits. CBD is not dilated. SPLEEN: within normal limits. PANCREAS: No mass or ductal dilatation.   ADRENALS: Unremarkable. KIDNEYS: No mass, calculus, or hydronephrosis. STOMACH: Unremarkable. SMALL BOWEL: No dilatation or wall thickening. COLON: No dilatation or wall thickening. APPENDIX: Normal.  PERITONEUM: No ascites or pneumoperitoneum. RETROPERITONEUM: No lymphadenopathy or aortic aneurysm. REPRODUCTIVE ORGANS: Enlarged prostate. URINARY BLADDER: No mass or calculus. BONES: Fusion L5-S1. Degenerative changes of the spine. ABDOMINAL WALL: No mass or hernia. ADDITIONAL COMMENTS: N/A      Impression IMPRESSION:  No significant findings. Enlarged prostate. MRI Results (most recent):  Results from [place] encounter on 09/21/19   MRI BRAIN WO CONT    Narrative EXAM:  MRI BRAIN WO CONT  INDICATION:  Workup for TIA, patient presented to the ED with acute onset  multiple episodes left-sided numbness on the day of arrival lasting short  periods of time. Upon arrival symptoms had resolved. TECHNIQUE: Sagittal T1, axial FLAIR, T2,T1 and gradient echo images as well as  coronal T2 weighted images and axial diffusion weighted images of the head were  obtained. COMPARISON:  CT, CTA. FINDINGS:  The ventricular size and configuration are normal.   Scattered tiny punctate foci of T2 hyperintensity in the cerebral white matter  without associated restricted diffusion. Nonspecific pattern and possibly  chronic. Otherwise normal signal in the brain. No evidence of hemorrhage, mass, infarct or abnormal extra-axial fluid  collections. Flow voids are present in the vertebral basilar and carotid artery systems. The craniocervical junction is unremarkable. Mild mucosal thickening paranasal sinuses. Impression IMPRESSION: No acute intracranial abnormality demonstrated.               MENTAL STATUS:    Orientation:  Needed to year, month, and day of week   Eye Contact:  Appropriate   Motor Behavior:   Ambulates independently stable gait   Speech:   No evidence of aphasia or dysarthria   Thought Process: Logical and coherent   Thought Content:  No evidence of hallucinations or delusions   Suicidal ideations:  Denies   Mood:   Anxious   Affect:   Congruent with stated mood   Concentration:   Mildly reduced   Abstraction:   Within normal limits   Insight:   Adequate     On the Modified Mini-Mental Status Exam: 92/100 (WNL)      DIAGNOSTIC IMPRESSIONS:    ICD-10-CM ICD-9-CM    1. Mild cognitive impairment with memory loss  G31.84 331.83              PLAN:  1. Complete a comprehensive neuropsychological assessment to provide a differential diagnosis of presenting concerns as well as to assist with disposition and treatment planning as appropriate. 2. Consider compensatory and remedial cognitive training. 25720 x 1 Review of records. Face to face interview w/ patient. Determine test protocol: 60 minutes.  Total 1 unit        Ivone Vides, PhD, ABPP, LCP  Licensed Clinical Psychologist/ Neuropsychologist

## 2021-04-02 ENCOUNTER — TELEPHONE (OUTPATIENT)
Dept: NEUROLOGY | Age: 61
End: 2021-04-02

## 2021-04-02 NOTE — TELEPHONE ENCOUNTER
----- Message from Teresa Greene sent at 2021 12:18 PM EDT -----  Regarding: PsyD Curry/ telephone  Appointment not available    Patient's first and last name: Vianney Patino. :    1960      Caller's first and last name and relationship to patient (if not the patient):  pt     Best contact number:   [phone]    Preferred date and time: 21 or 21 anytime    Scheduled appointment date and time:  2021 2:30PM    Reason for appointment:  follow up    Details to clarify the request: Pt is requesting to cancel and reschedule appointment as soon as possible with in timeframe.

## 2021-04-06 NOTE — TELEPHONE ENCOUNTER
----- Message from Carmen Rosen sent at 4/6/2021 10:46 AM EDT -----  Regarding: Dr. Francisco Burciaga  General Message/Vendor Calls    Caller's first and last name: Angelo Meng      Reason for call: appt to be reschedule with Dr. Melgar Body required yes/no and why: No      Best contact number(s): [phone]      Details to clarify the request: Mrs. Patterson [facility] called the office last Friday, (04/02) to notify the clinic that patient would not be keeping his appointment for Monday 04/05 with Dr. Shari Panchal. She said that they received about three calls yesterday for his appointment. She would like to make sure they do not get billed or 'no showed' for this appointment since she said she called Friday during the noon hour to cancel and requested someone to call back to reschedule. This appointment was not cancelled nor did she receive a call back to reschedule. Please contact Mrs. Villarreal to reschedule this appointment.       Carmen Rosen

## 2021-04-14 ENCOUNTER — VIRTUAL VISIT (OUTPATIENT)
Dept: NEUROLOGY | Age: 61
End: 2021-04-14
Payer: COMMERCIAL

## 2021-04-14 DIAGNOSIS — G31.84 MILD COGNITIVE IMPAIRMENT WITH MEMORY LOSS: Primary | ICD-10-CM

## 2021-04-14 PROCEDURE — 90832 PSYTX W PT 30 MINUTES: CPT | Performed by: PSYCHOLOGIST

## 2021-04-14 NOTE — PROGRESS NOTES
Pursuant to the emergency declaration under the [facility] waiver authority and the [employer] Act, this Virtual Visit was conducted, with appropriate consent obtained, to reduce the patient's risk of exposure to COVID-19 and provide continuity of care   Services were provided in this manner to substitute for in-person clinic visit. The originating site is the patient's home and the distance site is [facility] at [facility]. These types of teleneuropsychology/telehealth/telemedicine visits were authorized by the President of the [place], though I/we cannot guarantee what a third party payor will do reimbursement/coverage wise. I indicated that I would evaluate the patient and recommend diagnostics and treatment based on my assessment and impressions, and that our sessions are not being recorded and that personal health information is protected to the best of our abilities. [address] [place] [address] Neurology Clinic at [address]    Office:  [phone]  Fax: [phone]                 Follow-up Session    Patient Todd Hopkins. Age: 60 y.o. Lina Royals handed   Presenting Concern: Memory Decline  Primary Care Physician: Randy Ceron MD  Referring MD Nicho Amor Ma. is a 61 y.o. male who presents today for feedback following recent neuropsychological testing. The results were reviewed of the recent neuropsychological evaluation, including discussing individual tests as well as the patient's areas of neurocognitive strength versus their weaknesses. Supportive counseling was provided regarding is current diagnosis. Patient is concern about heavy metal toxicity.  Education was provided regarding my diagnostic impressions, and we discussed next steps for further evaluation down the road. I all also answered numerous questions related to the clinical findings, including discussing various methods to improve cognitive cognition and mood when relevant. The patient is encouraged to follow-up with the referring provider. DIAGNOSTIC IMPRESSIONS:    ICD-10-CM ICD-9-CM    1. Mild cognitive impairment with memory loss  G31.84 331.83        Time spent with patient in face to face conversation: 19 mins.     06614 30 minutes x 1    Vince Roman, PHD

## 2021-08-13 RX ORDER — GUAIFENESIN 100 MG/5ML
LIQUID (ML) ORAL
Qty: 90 TABLET | Refills: 1 | Status: SHIPPED | OUTPATIENT
Start: 2021-08-13 | End: 2021-12-06

## 2021-08-17 DIAGNOSIS — I10 ESSENTIAL HYPERTENSION: Chronic | ICD-10-CM

## 2021-08-17 RX ORDER — PRAVASTATIN SODIUM 40 MG/1
TABLET ORAL
Qty: 90 TABLET | Refills: 1 | Status: SHIPPED | OUTPATIENT
Start: 2021-08-17 | End: 2021-12-06

## 2021-08-17 RX ORDER — LISINOPRIL 20 MG/1
TABLET ORAL
Qty: 90 TABLET | Refills: 1 | Status: SHIPPED | OUTPATIENT
Start: 2021-08-17 | End: 2022-02-15

## 2021-09-08 ENCOUNTER — OFFICE VISIT (OUTPATIENT)
Dept: INTERNAL MEDICINE CLINIC | Age: 61
End: 2021-09-08
Payer: COMMERCIAL

## 2021-09-08 VITALS
WEIGHT: 201.8 LBS | RESPIRATION RATE: 20 BRPM | HEART RATE: 82 BPM | BODY MASS INDEX: 27.33 KG/M2 | TEMPERATURE: 98.1 F | HEIGHT: 72 IN | DIASTOLIC BLOOD PRESSURE: 82 MMHG | OXYGEN SATURATION: 99 % | SYSTOLIC BLOOD PRESSURE: 120 MMHG

## 2021-09-08 DIAGNOSIS — G45.9 TIA (TRANSIENT ISCHEMIC ATTACK): Chronic | ICD-10-CM

## 2021-09-08 DIAGNOSIS — M02.30 REACTIVE ARTHRITIS (HCC): Chronic | ICD-10-CM

## 2021-09-08 DIAGNOSIS — F51.04 CHRONIC INSOMNIA: Chronic | ICD-10-CM

## 2021-09-08 DIAGNOSIS — Z79.899 LONG-TERM USE OF HIGH-RISK MEDICATION: Chronic | ICD-10-CM

## 2021-09-08 DIAGNOSIS — E78.00 HYPERCHOLESTEROLEMIA: Chronic | ICD-10-CM

## 2021-09-08 DIAGNOSIS — K21.9 GASTROESOPHAGEAL REFLUX DISEASE WITHOUT ESOPHAGITIS: Chronic | ICD-10-CM

## 2021-09-08 DIAGNOSIS — I10 ESSENTIAL HYPERTENSION: Primary | Chronic | ICD-10-CM

## 2021-09-08 DIAGNOSIS — F33.0 DEPRESSION, MAJOR, RECURRENT, MILD (HCC): ICD-10-CM

## 2021-09-08 DIAGNOSIS — K50.919 CROHN'S DISEASE WITH COMPLICATION, UNSPECIFIED GASTROINTESTINAL TRACT LOCATION (HCC): Chronic | ICD-10-CM

## 2021-09-08 LAB
ALBUMIN SERPL-MCNC: 3.8 G/DL (ref 3.5–5)
ALBUMIN/GLOB SERPL: 1.3 {RATIO} (ref 1.1–2.2)
ALP SERPL-CCNC: 99 U/L (ref 45–117)
ALT SERPL-CCNC: 36 U/L (ref 12–78)
ANION GAP SERPL CALC-SCNC: 2 MMOL/L (ref 5–15)
APPEARANCE UR: CLEAR
AST SERPL-CCNC: 26 U/L (ref 15–37)
BACTERIA URNS QL MICRO: NEGATIVE /HPF
BASOPHILS # BLD: 0.1 K/UL (ref 0–0.1)
BASOPHILS NFR BLD: 1 % (ref 0–1)
BILIRUB SERPL-MCNC: 0.3 MG/DL (ref 0.2–1)
BILIRUB UR QL: NEGATIVE
BUN SERPL-MCNC: 22 MG/DL (ref 6–20)
BUN/CREAT SERPL: 23 (ref 12–20)
CALCIUM SERPL-MCNC: 9.2 MG/DL (ref 8.5–10.1)
CHLORIDE SERPL-SCNC: 111 MMOL/L (ref 97–108)
CHOLEST SERPL-MCNC: 132 MG/DL
CK SERPL-CCNC: 245 U/L (ref 39–308)
CO2 SERPL-SCNC: 28 MMOL/L (ref 21–32)
COLOR UR: NORMAL
CREAT SERPL-MCNC: 0.95 MG/DL (ref 0.7–1.3)
DIFFERENTIAL METHOD BLD: NORMAL
EOSINOPHIL # BLD: 0.2 K/UL (ref 0–0.4)
EOSINOPHIL NFR BLD: 4 % (ref 0–7)
EPITH CASTS URNS QL MICRO: NORMAL /LPF
ERYTHROCYTE [DISTWIDTH] IN BLOOD BY AUTOMATED COUNT: 13.2 % (ref 11.5–14.5)
GLOBULIN SER CALC-MCNC: 3 G/DL (ref 2–4)
GLUCOSE SERPL-MCNC: 96 MG/DL (ref 65–100)
GLUCOSE UR STRIP.AUTO-MCNC: NEGATIVE MG/DL
HCT VFR BLD AUTO: 46.5 % (ref 36.6–50.3)
HDLC SERPL-MCNC: 47 MG/DL
HDLC SERPL: 2.8 {RATIO} (ref 0–5)
HGB BLD-MCNC: 14.9 G/DL (ref 12.1–17)
HGB UR QL STRIP: NEGATIVE
HYALINE CASTS URNS QL MICRO: NORMAL /LPF (ref 0–5)
IMM GRANULOCYTES # BLD AUTO: 0 K/UL (ref 0–0.04)
IMM GRANULOCYTES NFR BLD AUTO: 0 % (ref 0–0.5)
KETONES UR QL STRIP.AUTO: NEGATIVE MG/DL
LDLC SERPL CALC-MCNC: 72.2 MG/DL (ref 0–100)
LEUKOCYTE ESTERASE UR QL STRIP.AUTO: NEGATIVE
LYMPHOCYTES # BLD: 1.4 K/UL (ref 0.8–3.5)
LYMPHOCYTES NFR BLD: 24 % (ref 12–49)
MCH RBC QN AUTO: 30.5 PG (ref 26–34)
MCHC RBC AUTO-ENTMCNC: 32 G/DL (ref 30–36.5)
MCV RBC AUTO: 95.1 FL (ref 80–99)
MONOCYTES # BLD: 0.6 K/UL (ref 0–1)
MONOCYTES NFR BLD: 10 % (ref 5–13)
NEUTS SEG # BLD: 3.4 K/UL (ref 1.8–8)
NEUTS SEG NFR BLD: 61 % (ref 32–75)
NITRITE UR QL STRIP.AUTO: NEGATIVE
NRBC # BLD: 0 K/UL (ref 0–0.01)
NRBC BLD-RTO: 0 PER 100 WBC
PH UR STRIP: 5.5 [PH] (ref 5–8)
PLATELET # BLD AUTO: 269 K/UL (ref 150–400)
PMV BLD AUTO: 11.4 FL (ref 8.9–12.9)
POTASSIUM SERPL-SCNC: 5 MMOL/L (ref 3.5–5.1)
PROT SERPL-MCNC: 6.8 G/DL (ref 6.4–8.2)
PROT UR STRIP-MCNC: NEGATIVE MG/DL
RBC # BLD AUTO: 4.89 M/UL (ref 4.1–5.7)
RBC #/AREA URNS HPF: NORMAL /HPF (ref 0–5)
SODIUM SERPL-SCNC: 141 MMOL/L (ref 136–145)
SP GR UR REFRACTOMETRY: 1.02 (ref 1–1.03)
TRIGL SERPL-MCNC: 64 MG/DL (ref ?–150)
TSH SERPL DL<=0.05 MIU/L-ACNC: 0.93 UIU/ML (ref 0.36–3.74)
UA: UC IF INDICATED,UAUC: NORMAL
UROBILINOGEN UR QL STRIP.AUTO: 0.2 EU/DL (ref 0.2–1)
VLDLC SERPL CALC-MCNC: 12.8 MG/DL
WBC # BLD AUTO: 5.6 K/UL (ref 4.1–11.1)
WBC URNS QL MICRO: NORMAL /HPF (ref 0–4)

## 2021-09-08 PROCEDURE — 99214 OFFICE O/P EST MOD 30 MIN: CPT | Performed by: INTERNAL MEDICINE

## 2021-09-08 NOTE — PROGRESS NOTES
Subjective:     Shahida Ordoñez returns to the office today in follow-up of multiple medical problems. Patient has hypertension currently on lisinopril. He tolerates this without cough, rash, lower extremity edema. Denies headaches, numbness, tingling or focal neurological problems. Hypercholesterolemia currently managed on pravastatin therapy. Tolerates this without muscle soreness or GI upset. Has no history of coronary artery disease but has a previous history of TIA for which he takes baby aspirin. He denies exertional chest pains, claudication or strokelike symptoms. Patient does have a history of GERD currently managed on PPI therapy. He denies breakthrough heartburn and has had no dysphagia, early satiety or unexplained weight loss. Depression is currently managed on duloxetine. In addition he does have problems with chronic insomnia and does take Ambien CR and trazodone. Medications have been working effectively for him and he has had no breakthrough symptoms or long-term side effects related to his medication. He continues to be followed by Dr. Jhoana Perez in regards to his Crohn's disease and reactive arthritis. He has had no exacerbations of this problem.     Past Medical History:   Diagnosis Date    Chronic insomnia 8/16/2018    Colon polyps 8/14/2018    Cro[facility]'s disease ([facility] [address] 8/14/2018    Depression 8/14/2018    Diverticulosis 8/14/2018    Fibromyalgia 8/14/2018    Gastroesophageal reflux disease without esophagitis 8/16/2018    IBS (irritable bowel syndrome) 8/14/2018    Onychomycosis 8/14/2018    Reactive arthritis ([facility] [address] 8/14/2018    Sarcoidosis 8/14/2018     Past Surgical History:   Procedure Laterality Date    HX ORCHIECTOMY Left     HX ORTHOPAEDIC      L3-5 lumbar surgery    HX TONSILLECTOMY       Allergies   Allergen Reactions    Remicade [Infliximab] Other (comments)     Current Outpatient Medications   Medication Sig Dispense Refill    DULoxetine (CYMBALTA) 60 mg capsule TAKE 1 CAPSULE BY MOUTH EVERY DAY 90 Capsule 1    lisinopriL (PRINIVIL, ZESTRIL) 20 mg tablet TAKE 1 TABLET BY MOUTH EVERY DAY 90 Tablet 1    pravastatin (PRAVACHOL) 40 mg tablet TAKE 1 TABLET BY MOUTH EVERY DAY AT NIGHT 90 Tablet 1    aspirin 81 mg chewable tablet TAKE 1 TABLET BY MOUTH EVERY DAY 90 Tablet 1    zolpidem CR (AMBIEN CR) 12.5 mg tablet TAKE 1 TABLET BY MOUTH AT BEDTIME  3    Dexlansoprazole (DEXILANT) 60 mg CpDB Take  by mouth daily.  traZODone (DESYREL) 50 mg tablet Take  by mouth nightly. Social History     Socioeconomic History    Marital status:      Spouse name: Not on file    Number of children: 2    Years of education: Not on file    Highest education level: Not on file   Occupational History    Occupation: [employer]   Tobacco Use    Smoking status: Current Every Day Smoker     Packs/day: 1.50    Smokeless tobacco: Never Used   Vaping Use    Vaping Use: Never used   Substance and Sexual Activity    Alcohol use: No    Drug use: Never     Social Determinants of Health     Financial Resource Strain:     Difficulty of Paying Living Expenses:    Food Insecurity:     Worried About Running Out of Food in the Last Year:     [address] in the Last Year:    Transportation Needs:     Lack of Transportation (Medical):      Lack of Transportation (Non-Medical):    Physical Activity:     Days of Exercise per Week:     Minutes of Exercise per Session:    Stress:     Feeling of Stress :    Social Connections:     Frequency of Communication with Friends and Family:     Frequency of Social Gatherings with Friends and Family:     Attends Religion Services:     Active Member of Clubs or Organizations:     Attends Club or Organization Meetings:     Marital Status:      Family History   Problem Relation Age of Onset    Hypertension Mother     Hypertension Father     Prostate Cancer Father     Stroke Father     Hypertension Brother    [facility] Hypertension Paternal Grandfather     Colon Cancer Paternal Grandfather     Colon Cancer Paternal Uncle     No Known Problems Child        Review of Systems:  GEN: no weight loss, weight gain, fatigue or night sweats  CV: no PND, orthopnea, or palpitations  Resp: no dyspnea on exertion, no cough  Abd: no nausea, vomiting or diarrhea  EXT: denies edema, claudication  Endocrine: no hair loss, excessive thirst or polyuria  Neurological ROS: no TIA or stroke symptoms  ROS otherwise negative      Objective:     Visit Vitals  /82 (BP 1 Location: Right upper arm, BP Patient Position: Sitting, BP Cuff Size: Adult)   Pulse 82   Temp 98.1 °F (36.7 °C) (Oral)   Resp 20   Ht 6' (1.829 m)   Wt 201 lb 12.8 oz (91.5 kg)   SpO2 99%   BMI 27.37 kg/m²     Body mass index is 27.37 kg/m². General:   alert, cooperative and no distress   Eyes: conjunctivae/sclerae clear. PERRL, EOM's intact   Mouth:  No oral lesions, no pharyngeal erythema, no exudates   Neck: Trachea midline, no thyromegaly, no bruits   Heart: S1 and S2 normal,no murmurs noted    Lungs: Clear to auscultation bilaterally, no increased work of breathing   Abdomen: Soft, nontender. Normal bowel sounds   Extremities: No edema or cyanosis   Neuro: ..alert, oriented x3,speech normal in context and clarity, cranial nerves II-XII intact,motor strength: full proximally and distally,gait: normal  reflexes: full and symmetric     Physical exam otherwise negative         Assessment/Plan:     Diagnoses and all orders for this visit:    Essential hypertension  -     COLLECTION VENOUS BLOOD,VENIPUNCTURE  -     CBC WITH AUTOMATED DIFF; Future  -     METABOLIC PANEL, COMPREHENSIVE; Future  -     URINALYSIS W/ REFLEX CULTURE; Future    Hypercholesterolemia  -     LIPID PANEL; Future  -     TSH 3RD GENERATION; Future    Long-term use of high-risk medication  -     CK;  Future    TIA (transient ischemic attack)    Crohn's disease with complication, unspecified gastrointestinal tract location ([facility] [address]    Reactive arthritis (HCC)    Gastroesophageal reflux disease without esophagitis    Chronic insomnia    Depression, major, recurrent, mild ([facility] [address]        Other instructions: The patient's medications were reviewed and reconciled. No change in his current medical regimen will be made. A no added salt, prudent diet is encouraged    I have strongly recommended Juanito19 vaccination as he has had 2 illnesses due to Covidnineteen 1 year apart. Await results of multiple labs    Follow-up in 6 months        Salima Jacobs MD    Please note that this dictation was completed with [employer], the computer voice recognition software. Quite often unanticipated grammatical, syntax, homophones, and other interpretive errors are inadvertently transcribed by the computer software. Please disregard these errors. Please excuse any errors that have escaped final proofreading.

## 2021-09-08 NOTE — PATIENT INSTRUCTIONS
DASH Diet: Care Instructions  Your Care Instructions     The DASH diet is an eating plan that can help lower your blood pressure. DASH stands for Dietary Approaches to Stop Hypertension. Hypertension is high blood pressure. The DASH diet focuses on eating foods that are high in calcium, potassium, and magnesium. These nutrients can lower blood pressure. The foods that are highest in these nutrients are fruits, vegetables, low-fat dairy products, nuts, seeds, and legumes. But taking calcium, potassium, and magnesium supplements instead of eating foods that are high in those nutrients does not have the same effect. The DASH diet also includes whole grains, fish, and poultry. The DASH diet is one of several lifestyle changes your doctor may recommend to lower your high blood pressure. Your doctor may also want you to decrease the amount of sodium in your diet. Lowering sodium while following the DASH diet can lower blood pressure even further than just the DASH diet alone. Follow-up care is a key part of your treatment and safety. Be sure to make and go to all appointments, and call your doctor if you are having problems. It's also a good idea to know your test results and keep a list of the medicines you take. How can you care for yourself at home? Following the DASH diet  · Eat 4 to 5 servings of fruit each day. A serving is 1 medium-sized piece of fruit, ½ cup chopped or canned fruit, 1/4 cup dried fruit, or 4 ounces (½ cup) of fruit juice. Choose fruit more often than fruit juice. · Eat 4 to 5 servings of vegetables each day. A serving is 1 cup of lettuce or raw leafy vegetables, ½ cup of chopped or cooked vegetables, or 4 ounces (½ cup) of vegetable juice. Choose vegetables more often than vegetable juice. · Get 2 to 3 servings of low-fat and fat-free dairy each day. A serving is 8 ounces of milk, 1 cup of yogurt, or 1 ½ ounces of cheese. · Eat 6 to 8 servings of grains each day.  A serving is 1 slice of bread, 1 ounce of dry cereal, or ½ cup of cooked rice, pasta, or cooked cereal. Try to choose whole-grain products as much as possible. · Limit lean meat, poultry, and fish to 2 servings each day. A serving is 3 ounces, about the size of a deck of cards. · Eat 4 to 5 servings of nuts, seeds, and legumes (cooked dried beans, lentils, and split peas) each week. A serving is 1/3 cup of nuts, 2 tablespoons of seeds, or ½ cup of cooked beans or peas. · Limit fats and oils to 2 to 3 servings each day. A serving is 1 teaspoon of vegetable oil or 2 tablespoons of salad dressing. · Limit sweets and added sugars to 5 servings or less a week. A serving is 1 tablespoon jelly or jam, ½ cup sorbet, or 1 cup of lemonade. · Eat less than 2,300 milligrams (mg) of sodium a day. If you limit your sodium to 1,500 mg a day, you can lower your blood pressure even more. · Be aware that all of these are the suggested number of servings for people who eat 1,800 to 2,000 calories a day. Your recommended number of servings may be different if you need more or fewer calories. Tips for success  · Start small. Do not try to make dramatic changes to your diet all at once. You might feel that you are missing out on your favorite foods and then be more likely to not follow the plan. Make small changes, and stick with them. Once those changes become habit, add a few more changes. · Try some of the following:  ? Make it a goal to eat a fruit or vegetable at every meal and at snacks. This will make it easy to get the recommended amount of fruits and vegetables each day. ? Try yogurt topped with fruit and nuts for a snack or healthy dessert. ? Add lettuce, tomato, cucumber, and onion to sandwiches. ? Combine a ready-made pizza crust with low-fat mozzarella cheese and lots of vegetable toppings. Try using tomatoes, squash, spinach, broccoli, carrots, cauliflower, and onions. ?  Have a variety of cut-up vegetables with a low-fat dip as an appetizer instead of chips and dip. ? Sprinkle sunflower seeds or chopped almonds over salads. Or try adding chopped walnuts or almonds to cooked vegetables. ? Try some vegetarian meals using beans and peas. Add garbanzo or kidney beans to salads. Make burritos and tacos with mashed giron beans or black beans. Where can you learn more? Go to http://www.umana.com/  Enter H967 in the search box to learn more about \"DASH Diet: Care Instructions. \"  Current as of: August 31, 2020               Content Version: 12.8  © 2356-9101 [employer]. Care instructions adapted under license by [employer] (which disclaims liability or warranty for this information). If you have questions about a medical condition or this instruction, always ask your healthcare professional. [address] any warranty or liability for your use of this information.

## 2021-09-08 NOTE — PROGRESS NOTES
Ranjit Persaud. is a 64 y.o. male presenting for Follow Up Chronic Condition (6 mo fu)  . 1. Have you been to the ER, urgent care clinic since your last visit? Hospitalized since your last visit? No    2. Have you seen or consulted any other health care providers outside of the [address] [place] [address] since your last visit? Include any pap smears or colon screening. No    No flowsheet data found. Abuse Screening Questionnaire 3/2/2021   Do you ever feel afraid of your partner? N   Are you in a relationship with someone who physically or mentally threatens you? N   Is it safe for you to go home? Y       3 most recent PHQ Screens 3/2/2021   Little interest or pleasure in doing things Not at all   Feeling down, depressed, irritable, or hopeless Not at all   Total Score PHQ 2 0   Trouble falling or staying asleep, or sleeping too much Not at all   Feeling tired or having little energy Not at all   Poor appetite, weight loss, or overeating Not at all   Feeling bad about yourself - or that you are a failure or have let yourself or your family down Not at all   Trouble concentrating on things such as school, work, reading, or watching TV Not at all   Moving or speaking so slowly that other people could have noticed; or the opposite being so fidgety that others notice Not at all   Thoughts of being better off dead, or hurting yourself in some way Not at all   PHQ 9 Score 0   How difficult have these problems made it for you to do your work, take care of your home and get along with others Not difficult at all       There are no discontinued medications.

## 2021-12-06 RX ORDER — GUAIFENESIN 100 MG/5ML
LIQUID (ML) ORAL
Qty: 90 TABLET | Refills: 1 | Status: SHIPPED | OUTPATIENT
Start: 2021-12-06 | End: 2022-05-18 | Stop reason: SDUPTHER

## 2021-12-06 RX ORDER — PRAVASTATIN SODIUM 40 MG/1
TABLET ORAL
Qty: 90 TABLET | Refills: 1 | Status: SHIPPED | OUTPATIENT
Start: 2021-12-06 | End: 2022-07-22 | Stop reason: SDUPTHER

## 2022-03-09 ENCOUNTER — OFFICE VISIT (OUTPATIENT)
Dept: INTERNAL MEDICINE CLINIC | Age: 62
End: 2022-03-09
Payer: COMMERCIAL

## 2022-03-09 VITALS
BODY MASS INDEX: 27.44 KG/M2 | TEMPERATURE: 98.3 F | OXYGEN SATURATION: 97 % | HEIGHT: 72 IN | WEIGHT: 202.6 LBS | DIASTOLIC BLOOD PRESSURE: 80 MMHG | SYSTOLIC BLOOD PRESSURE: 126 MMHG | HEART RATE: 66 BPM | RESPIRATION RATE: 20 BRPM

## 2022-03-09 DIAGNOSIS — H93.12 TINNITUS OF LEFT EAR: ICD-10-CM

## 2022-03-09 DIAGNOSIS — I10 ESSENTIAL HYPERTENSION: Primary | Chronic | ICD-10-CM

## 2022-03-09 DIAGNOSIS — G45.9 TIA (TRANSIENT ISCHEMIC ATTACK): Chronic | ICD-10-CM

## 2022-03-09 DIAGNOSIS — M79.7 FIBROMYALGIA: ICD-10-CM

## 2022-03-09 DIAGNOSIS — E78.00 HYPERCHOLESTEROLEMIA: Chronic | ICD-10-CM

## 2022-03-09 DIAGNOSIS — K40.90 RIGHT INGUINAL HERNIA: ICD-10-CM

## 2022-03-09 DIAGNOSIS — R05.9 COUGH: ICD-10-CM

## 2022-03-09 DIAGNOSIS — F51.04 CHRONIC INSOMNIA: Chronic | ICD-10-CM

## 2022-03-09 DIAGNOSIS — Z79.899 LONG-TERM USE OF HIGH-RISK MEDICATION: Chronic | ICD-10-CM

## 2022-03-09 DIAGNOSIS — F33.0 DEPRESSION, MAJOR, RECURRENT, MILD (HCC): ICD-10-CM

## 2022-03-09 DIAGNOSIS — K50.919 CROHN'S DISEASE WITH COMPLICATION, UNSPECIFIED GASTROINTESTINAL TRACT LOCATION (HCC): Chronic | ICD-10-CM

## 2022-03-09 PROCEDURE — 99215 OFFICE O/P EST HI 40 MIN: CPT | Performed by: INTERNAL MEDICINE

## 2022-03-09 NOTE — PROGRESS NOTES
Subjective:     Dustin Palomino returns to the office today in follow-up of multiple medical problems and also has complaints of 3 new problems. The patient has hypertension. He is currently on lisinopril. He tolerates this without rash, orthostatic dizziness or lower extremity edema. Denies headaches, numbness, tingling or focal neurological problems. He has hypercholesterolemia currently on statin therapy. He tolerates this without muscle soreness or GI upset. Has a prior history of TIA and remains on daily aspirin. He has no history of coronary disease and denies exertional chest pains or claudication. GERD is currently managed on PPI therapy. He denies breakthrough heartburn and has had no dysphagia, early satiety or unexplained weight loss. He has Crohn's disease and is followed by Dr. Amor Serna for this and is currently not on medication for this. He has had no recent flares. He has fibromyalgia and a history of depression for which she is on Cymbalta. The medication is working effectively for him as it controls his pain and he has had no exacerbation of underlying depression. In addition he does have chronic insomnia and does take Ambien and trazodone for this. This does effectively help him sleep at night without any difficulties. He denies hangover effect. The patient complains of a chronic cough. It began after he had his first bout of Covid. He notes continued postnasal drainage. This has been ongoing for at least a year. No wheezing, shortness of breath or pleuritic pain. He is a smoker. The patient complains of left-sided tinnitus. He has noted this for about a year. He denies any right-sided symptoms. There has been no vertigo. States that he has had earwax issues in the past.    He complains of a possible inguinal hernia. He has found a bulging, somewhat uncomfortable area in his right groin.   It is worsened with the amount of coughing that he has done since he had Covid. He denies any changes in bowel habits. Past Medical History:   Diagnosis Date    Chronic insomnia 8/16/2018    Colon polyps 8/14/2018    Cro[facility]'s disease ([facility] [address] 8/14/2018    Depression 8/14/2018    Diverticulosis 8/14/2018    Fibromyalgia 8/14/2018    Gastroesophageal reflux disease without esophagitis 8/16/2018    IBS (irritable bowel syndrome) 8/14/2018    Onychomycosis 8/14/2018    Reactive arthritis ([facility] [address] 8/14/2018    Sarcoidosis 8/14/2018     Past Surgical History:   Procedure Laterality Date    HX ORCHIECTOMY Left     HX ORTHOPAEDIC      L3-5 lumbar surgery    HX TONSILLECTOMY       Allergies   Allergen Reactions    Remicade [Infliximab] Other (comments)     Current Outpatient Medications   Medication Sig Dispense Refill    lisinopriL (PRINIVIL, ZESTRIL) 20 mg tablet TAKE 1 TABLET BY MOUTH EVERY DAY 90 Tablet 0    DULoxetine (CYMBALTA) 60 mg capsule TAKE 1 CAPSULE BY MOUTH EVERY DAY 90 Capsule 0    pravastatin (PRAVACHOL) 40 mg tablet TAKE 1 TABLET BY MOUTH EVERY DAY AT NIGHT 90 Tablet 1    aspirin 81 mg chewable tablet TAKE 1 TABLET BY MOUTH EVERY DAY 90 Tablet 1    zolpidem CR (AMBIEN CR) 12.5 mg tablet TAKE 1 TABLET BY MOUTH AT BEDTIME  3    Dexlansoprazole (DEXILANT) 60 mg CpDB Take  by mouth daily.  traZODone (DESYREL) 50 mg tablet Take  by mouth nightly.        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   Tobacco Use    Smoking status: Current Every Day Smoker     Packs/day: 1.50    Smokeless tobacco: Never Used   Vaping Use    Vaping Use: Never used   Substance and Sexual Activity    Alcohol use: No    Drug use: Never     Family History   Problem Relation Age of Onset    Hypertension Mother     Hypertension Father     Prostate Cancer Father     Stroke Father     Hypertension Brother     Hypertension Paternal Grandfather     Colon Cancer Paternal Grandfather     Colon Cancer Paternal Uncle     No Known Problems Child        Review of Systems:  GEN: no weight loss, weight gain, fatigue or night sweats  ENT: Positive for left-sided tinnitus with history of earwax. Positive for chronic thick postnasal drainage  CV: no PND, orthopnea, or palpitations  Resp: Positive for chronic cough  Abd: no nausea, vomiting or diarrhea. Positive for bulging, uncomfortable area in right groin  EXT: denies edema, claudication  Endocrine: no hair loss, excessive thirst or polyuria  Neurological ROS: no TIA or stroke symptoms  ROS otherwise negative      Objective:     Visit Vitals  /80 (BP 1 Location: Left upper arm, BP Patient Position: Sitting, BP Cuff Size: Adult)   Pulse 66   Temp 98.3 °F (36.8 °C) (Oral)   Resp 20   Ht 6' (1.829 m)   Wt 202 lb 9.6 oz (91.9 kg)   SpO2 97%   BMI 27.48 kg/m²     Body mass index is 27.48 kg/m². General:   alert, cooperative and no distress   ENT  right ear canal and TM normal.  Mild wax present in ear canal near TM   Mouth:  No oral lesions, no pharyngeal erythema, no exudates   Neck: Trachea midline, no thyromegaly, no bruits   Heart: S1 and S2 normal,no murmurs noted    Lungs: Clear to auscultation bilaterally, no increased work of breathing   Abdomen: Soft, nontender. Normal bowel sounds.   Positive for reducible right-sided inguinal hernia   Extremities: No edema or cyanosis   Neuro: ..alert, oriented x3,speech normal in context and clarity, cranial nerves II-XII intact,motor strength: full proximally and distally,gait: normal  reflexes: full and symmetric     Physical exam otherwise negative         Assessment/Plan:     Diagnoses and all orders for this visit:    Essential hypertension    Hypercholesterolemia    Long-term use of high-risk medication    TIA (transient ischemic attack)    Chronic insomnia    Crohn's disease with complication, unspecified gastrointestinal tract location [facility])    Depression, major, recurrent, mild ([facility] [address]    Fibromyalgia    Cough  -     XR CHEST PA LAT; Future    Tinnitus of left ear  -     REFERRAL TO ENT-OTOLARYNGOLOGY    Right inguinal hernia  -     REFERRAL TO GENERAL SURGERY        Other instructions: The patient's medications were reviewed and reconciled. No change in his current medical regimen will be made. A no added salt, prudent diet is encouraged    Patient advised to stop smoking    Patient has had Covid twice in the past and I have strongly encouraged him to have the Covid vaccination    In regards to his postnasal drainage and cough I have recommended that he start daily Allegra and Flonase    Referral to ENT for evaluation of left-sided tinnitus and earwax issues    Referral to general surgery made to evaluate left inguinal hernia    Chest x-ray obtained today which appears unremarkable    Labs from 9/8 were reviewed with the patient    Follow-up 6 months    Follow-up and Dispositions    · Return in about 6 months (around 9/9/2022). Milly Tirado MD    Please note that this dictation was completed with [employer], the computer voice recognition software. Quite often unanticipated grammatical, syntax, homophones, and other interpretive errors are inadvertently transcribed by the computer software. Please disregard these errors. Please excuse any errors that have escaped final proofreading.

## 2022-03-09 NOTE — PROGRESS NOTES
Marisa Santos. is a 64 y.o. male presenting for Follow Up Chronic Condition (6 mo fu)  . 1. Have you been to the ER, urgent care clinic since your last visit? Hospitalized since your last visit? No    2. Have you seen or consulted any other health care providers outside of the [address] [place] [address] since your last visit? Include any pap smears or colon screening. No    No flowsheet data found. Abuse Screening Questionnaire 3/2/2021   Do you ever feel afraid of your partner? N   Are you in a relationship with someone who physically or mentally threatens you? N   Is it safe for you to go home? Y       3 most recent PHQ Screens 3/9/2022   Little interest or pleasure in doing things Not at all   Feeling down, depressed, irritable, or hopeless Not at all   Total Score PHQ 2 0   Trouble falling or staying asleep, or sleeping too much Not at all   Feeling tired or having little energy Not at all   Poor appetite, weight loss, or overeating Not at all   Feeling bad about yourself - or that you are a failure or have let yourself or your family down Not at all   Trouble concentrating on things such as school, work, reading, or watching TV Not at all   Moving or speaking so slowly that other people could have noticed; or the opposite being so fidgety that others notice Not at all   Thoughts of being better off dead, or hurting yourself in some way Not at all   PHQ 9 Score 0   How difficult have these problems made it for you to do your work, take care of your home and get along with others Not difficult at all       There are no discontinued medications.

## 2022-03-09 NOTE — PATIENT INSTRUCTIONS
DASH Diet: Care Instructions  Your Care Instructions     The DASH diet is an eating plan that can help lower your blood pressure. DASH stands for Dietary Approaches to Stop Hypertension. Hypertension is high blood pressure. The DASH diet focuses on eating foods that are high in calcium, potassium, and magnesium. These nutrients can lower blood pressure. The foods that are highest in these nutrients are fruits, vegetables, low-fat dairy products, nuts, seeds, and legumes. But taking calcium, potassium, and magnesium supplements instead of eating foods that are high in those nutrients does not have the same effect. The DASH diet also includes whole grains, fish, and poultry. The DASH diet is one of several lifestyle changes your doctor may recommend to lower your high blood pressure. Your doctor may also want you to decrease the amount of sodium in your diet. Lowering sodium while following the DASH diet can lower blood pressure even further than just the DASH diet alone. Follow-up care is a key part of your treatment and safety. Be sure to make and go to all appointments, and call your doctor if you are having problems. It's also a good idea to know your test results and keep a list of the medicines you take. How can you care for yourself at home? Following the DASH diet  · Eat 4 to 5 servings of fruit each day. A serving is 1 medium-sized piece of fruit, ½ cup chopped or canned fruit, 1/4 cup dried fruit, or 4 ounces (½ cup) of fruit juice. Choose fruit more often than fruit juice. · Eat 4 to 5 servings of vegetables each day. A serving is 1 cup of lettuce or raw leafy vegetables, ½ cup of chopped or cooked vegetables, or 4 ounces (½ cup) of vegetable juice. Choose vegetables more often than vegetable juice. · Get 2 to 3 servings of low-fat and fat-free dairy each day. A serving is 8 ounces of milk, 1 cup of yogurt, or 1 ½ ounces of cheese. · Eat 6 to 8 servings of grains each day.  A serving is 1 slice of bread, 1 ounce of dry cereal, or ½ cup of cooked rice, pasta, or cooked cereal. Try to choose whole-grain products as much as possible. · Limit lean meat, poultry, and fish to 2 servings each day. A serving is 3 ounces, about the size of a deck of cards. · Eat 4 to 5 servings of nuts, seeds, and legumes (cooked dried beans, lentils, and split peas) each week. A serving is 1/3 cup of nuts, 2 tablespoons of seeds, or ½ cup of cooked beans or peas. · Limit fats and oils to 2 to 3 servings each day. A serving is 1 teaspoon of vegetable oil or 2 tablespoons of salad dressing. · Limit sweets and added sugars to 5 servings or less a week. A serving is 1 tablespoon jelly or jam, ½ cup sorbet, or 1 cup of lemonade. · Eat less than 2,300 milligrams (mg) of sodium a day. If you limit your sodium to 1,500 mg a day, you can lower your blood pressure even more. · Be aware that all of these are the suggested number of servings for people who eat 1,800 to 2,000 calories a day. Your recommended number of servings may be different if you need more or fewer calories. Tips for success  · Start small. Do not try to make dramatic changes to your diet all at once. You might feel that you are missing out on your favorite foods and then be more likely to not follow the plan. Make small changes, and stick with them. Once those changes become habit, add a few more changes. · Try some of the following:  ? Make it a goal to eat a fruit or vegetable at every meal and at snacks. This will make it easy to get the recommended amount of fruits and vegetables each day. ? Try yogurt topped with fruit and nuts for a snack or healthy dessert. ? Add lettuce, tomato, cucumber, and onion to sandwiches. ? Combine a ready-made pizza crust with low-fat mozzarella cheese and lots of vegetable toppings. Try using tomatoes, squash, spinach, broccoli, carrots, cauliflower, and onions. ?  Have a variety of cut-up vegetables with a low-fat dip as an appetizer instead of chips and dip. ? Sprinkle sunflower seeds or chopped almonds over salads. Or try adding chopped walnuts or almonds to cooked vegetables. ? Try some vegetarian meals using beans and peas. Add garbanzo or kidney beans to salads. Make burritos and tacos with mashed giron beans or black beans. Where can you learn more? Go to http://www.umana.com/  Enter H967 in the search box to learn more about \"DASH Diet: Care Instructions. \"  Current as of: January 10, 2022               Content Version: 13.2  © 9434-9657 [employer]TM[employer] by [employer]. Care instructions adapted under license by [employer] (which disclaims liability or warranty for this information). If you have questions about a medical condition or this instruction, always ask your healthcare professional. [address] any warranty or liability for your use of this information.

## 2022-03-16 DIAGNOSIS — I10 ESSENTIAL HYPERTENSION: Chronic | ICD-10-CM

## 2022-03-16 DIAGNOSIS — M79.7 FIBROMYALGIA: ICD-10-CM

## 2022-03-16 RX ORDER — DULOXETIN HYDROCHLORIDE 60 MG/1
CAPSULE, DELAYED RELEASE ORAL
Qty: 90 CAPSULE | Refills: 0 | Status: SHIPPED | OUTPATIENT
Start: 2022-03-16 | End: 2022-07-22 | Stop reason: SDUPTHER

## 2022-03-16 RX ORDER — LISINOPRIL 20 MG/1
TABLET ORAL
Qty: 90 TABLET | Refills: 0 | Status: SHIPPED | OUTPATIENT
Start: 2022-03-16 | End: 2022-08-11 | Stop reason: SDUPTHER

## 2022-03-18 PROBLEM — M79.7 FIBROMYALGIA: Status: ACTIVE | Noted: 2018-08-14

## 2022-03-18 PROBLEM — E03.4 HYPOTHYROIDISM DUE TO ACQUIRED ATROPHY OF THYROID: Status: ACTIVE | Noted: 2021-02-08

## 2022-03-18 PROBLEM — K50.90 CROHN'S DISEASE (HCC): Status: ACTIVE | Noted: 2018-08-14

## 2022-03-19 PROBLEM — M02.30 REACTIVE ARTHRITIS (HCC): Status: ACTIVE | Noted: 2018-08-14

## 2022-03-19 PROBLEM — E53.8 B12 DEFICIENCY: Status: ACTIVE | Noted: 2021-02-08

## 2022-03-19 PROBLEM — F33.0 DEPRESSION, MAJOR, RECURRENT, MILD (HCC): Status: ACTIVE | Noted: 2019-08-20

## 2022-03-19 PROBLEM — K57.90 DIVERTICULOSIS: Status: ACTIVE | Noted: 2018-08-14

## 2022-03-19 PROBLEM — B35.1 ONYCHOMYCOSIS: Status: ACTIVE | Noted: 2018-08-14

## 2022-03-19 PROBLEM — F17.200 TOBACCO DEPENDENCE: Status: ACTIVE | Noted: 2018-08-14

## 2022-03-19 PROBLEM — D86.9 SARCOIDOSIS: Status: ACTIVE | Noted: 2018-08-14

## 2022-03-19 PROBLEM — K21.9 GASTROESOPHAGEAL REFLUX DISEASE WITHOUT ESOPHAGITIS: Status: ACTIVE | Noted: 2018-08-16

## 2022-03-19 PROBLEM — R53.82 CHRONIC FATIGUE: Status: ACTIVE | Noted: 2020-08-27

## 2022-03-19 PROBLEM — I65.23 BILATERAL CAROTID ARTERY STENOSIS: Status: ACTIVE | Noted: 2019-09-22

## 2022-03-19 PROBLEM — I10 ESSENTIAL HYPERTENSION: Status: ACTIVE | Noted: 2019-07-08

## 2022-03-19 PROBLEM — E78.00 HYPERCHOLESTEROLEMIA: Status: ACTIVE | Noted: 2019-09-30

## 2022-03-19 PROBLEM — R41.3 DISTURBANCE OF MEMORY: Status: ACTIVE | Noted: 2021-02-08

## 2022-03-19 PROBLEM — K58.9 IBS (IRRITABLE BOWEL SYNDROME): Status: ACTIVE | Noted: 2018-08-14

## 2022-03-19 PROBLEM — Z79.899 LONG-TERM USE OF HIGH-RISK MEDICATION: Status: ACTIVE | Noted: 2019-09-30

## 2022-03-19 PROBLEM — F51.04 CHRONIC INSOMNIA: Status: ACTIVE | Noted: 2018-08-16

## 2022-03-20 PROBLEM — G45.9 TIA (TRANSIENT ISCHEMIC ATTACK): Status: ACTIVE | Noted: 2019-09-21

## 2022-03-20 PROBLEM — K63.5 COLON POLYPS: Status: ACTIVE | Noted: 2018-08-14

## 2022-03-20 PROBLEM — G45.1 TRANSIENT ISCHEMIC ATTACK INVOLVING RIGHT INTERNAL CAROTID ARTERY: Status: ACTIVE | Noted: 2019-09-22

## 2022-03-21 DIAGNOSIS — F51.04 CHRONIC INSOMNIA: Primary | ICD-10-CM

## 2022-03-21 RX ORDER — TRAZODONE HYDROCHLORIDE 50 MG/1
50 TABLET ORAL
Qty: 30 TABLET | Refills: 0 | Status: SHIPPED | OUTPATIENT
Start: 2022-03-21

## 2022-03-21 RX ORDER — ZOLPIDEM TARTRATE 12.5 MG/1
12.5 TABLET, FILM COATED, EXTENDED RELEASE ORAL
Qty: 30 TABLET | Refills: 0 | Status: SHIPPED | OUTPATIENT
Start: 2022-03-21 | End: 2022-04-20 | Stop reason: SDUPTHER

## 2022-03-21 NOTE — TELEPHONE ENCOUNTER
Requested Prescriptions     Pending Prescriptions Disp Refills    traZODone (DESYREL) 50 mg tablet  1     Sig: Take  by mouth nightly.  zolpidem CR (AMBIEN CR) 12.5 mg tablet  3     Sig: Take 1 Tablet by mouth nightly. Max Daily Amount: 12.5 mg. Last Refill: 8/16/18  Next Appointment:9/12/22    [facility] pharmacy told patient that he needs to get prescriptions from his pcp. 22

## 2022-03-23 ENCOUNTER — OFFICE VISIT (OUTPATIENT)
Dept: SURGERY | Age: 62
End: 2022-03-23
Payer: COMMERCIAL

## 2022-03-23 VITALS
RESPIRATION RATE: 16 BRPM | DIASTOLIC BLOOD PRESSURE: 68 MMHG | HEART RATE: 78 BPM | OXYGEN SATURATION: 98 % | SYSTOLIC BLOOD PRESSURE: 118 MMHG | HEIGHT: 73 IN | BODY MASS INDEX: 27.04 KG/M2 | WEIGHT: 204 LBS | TEMPERATURE: 97.5 F

## 2022-03-23 DIAGNOSIS — K40.90 RIGHT INGUINAL HERNIA: Primary | ICD-10-CM

## 2022-03-23 PROCEDURE — 99204 OFFICE O/P NEW MOD 45 MIN: CPT | Performed by: SURGERY

## 2022-03-23 NOTE — LETTER
3/23/2022    Patient: Darien Ferguson. YOB: 1960   Date of Visit: 3/23/2022     Bert Conway [facility] [address]  [address] [address]  Via In Basket    Dear Bert Conway MD,      Thank you for referring Mr. Chris Graham to  [facility]  for evaluation. My notes for this consultation are attached. If you have questions, please do not hesitate to call me. I look forward to following your patient along with you.       Sincerely,    Bulmaro Ceja MD

## 2022-03-23 NOTE — PROGRESS NOTES
HISTORY OF PRESENT ILLNESS  Monica Bryan is a 64 y.o. male who comes in for consultation by Priscilla Jean-Baptiste MD for a hernia  HPI  He has noted right groin swelling and discomfort since 9/2019. It has slowly been getting larger and more uncomfortable. It does go away when he lays down. He has not done any therapy for it. He has not had anything similar in the past but did have a left orchiectomy for torsion. He denies nausea, vomiting, constipation, melena, hematochezia, dysuria or hematuria. He has crohns disease and has some diarrhea at times but is currently not on any therapy. Past Medical History:   Diagnosis Date    Chronic insomnia 8/16/2018    Colon polyps 8/14/2018    Crohn's disease ([facility] [address] 8/14/2018    Depression 8/14/2018    Diverticulosis 8/14/2018    Fibromyalgia 8/14/2018    Gastroesophageal reflux disease without esophagitis 8/16/2018    IBS (irritable bowel syndrome) 8/14/2018    Onychomycosis 8/14/2018    Reactive arthritis ([facility] [address] 8/14/2018    Sarcoidosis 8/14/2018     Past Surgical History:   Procedure Laterality Date    HX ORCHIECTOMY Left     HX ORTHOPAEDIC      L3-5 lumbar surgery    HX TONSILLECTOMY       Family History   Problem Relation Age of Onset    Hypertension Mother     Hypertension Father     Prostate Cancer Father     Stroke Father     Hypertension Brother     Hypertension Paternal Grandfather     Colon Cancer Paternal Grandfather     Colon Cancer Paternal Uncle     No Known Problems Child      Social History     Tobacco Use    Smoking status: Current Every Day Smoker     Packs/day: 1.50    Smokeless tobacco: Never Used   Vaping Use    Vaping Use: Never used   Substance Use Topics    Alcohol use: No    Drug use: Never     Current Outpatient Medications   Medication Sig    traZODone (DESYREL) 50 mg tablet Take 1 Tablet by mouth nightly.  zolpidem CR (AMBIEN CR) 12.5 mg tablet Take 1 Tablet by mouth nightly.  Max Daily Amount: 12.5 mg.    DULoxetine (CYMBALTA) 60 mg capsule TAKE 1 CAPSULE BY MOUTH EVERY DAY    lisinopriL (PRINIVIL, ZESTRIL) 20 mg tablet TAKE 1 TABLET BY MOUTH EVERY DAY    pravastatin (PRAVACHOL) 40 mg tablet TAKE 1 TABLET BY MOUTH EVERY DAY AT NIGHT    aspirin 81 mg chewable tablet TAKE 1 TABLET BY MOUTH EVERY DAY    Dexlansoprazole (DEXILANT) 60 mg CpDB Take  by mouth daily. No current facility-administered medications for this visit. Allergies   Allergen Reactions    Remicade [Infliximab] Other (comments)       Review of Systems   Constitutional: Negative for chills, diaphoresis, fever, malaise/fatigue and weight loss. HENT: Negative for congestion, ear pain and sore throat. Eyes: Negative for blurred vision and pain. Respiratory: Positive for cough. Negative for hemoptysis, sputum production, shortness of breath, wheezing and stridor. Sleep apnea   Cardiovascular: Negative for chest pain, palpitations, orthopnea, claudication, leg swelling and PND. Gastrointestinal: Positive for abdominal pain. Negative for blood in stool, constipation, diarrhea, heartburn, melena, nausea and vomiting. Genitourinary: Negative for dysuria, flank pain, frequency, hematuria and urgency. Musculoskeletal: Positive for back pain and joint pain. Negative for myalgias and neck pain. Skin: Negative for itching and rash. Neurological: Negative for dizziness, tremors, focal weakness, seizures, weakness and headaches. Hx TIA but no residual   Endo/Heme/Allergies: Negative for polydipsia. Psychiatric/Behavioral: Negative for depression and memory loss. The patient is not nervous/anxious. Visit Vitals  /68 (BP 1 Location: Left upper arm, BP Patient Position: Sitting, BP Cuff Size: Adult)   Pulse 78   Temp 97.5 °F (36.4 °C) (Temporal)   Resp 16   Ht 6' 1\" (1.854 m)   Wt 92.5 kg (204 lb)   SpO2 98%   BMI 26.91 kg/m²       Physical Exam  Constitutional:       General: He is not in acute distress. Appearance: Normal appearance. He is well-developed. He is not diaphoretic. HENT:      Head: Normocephalic and atraumatic. Mouth/Throat:      Pharynx: No oropharyngeal exudate. Eyes:      General: No scleral icterus. Conjunctiva/sclera: Conjunctivae normal.      Pupils: Pupils are equal, round, and reactive to light. Neck:      Thyroid: No thyromegaly. Trachea: No tracheal deviation. Cardiovascular:      Rate and Rhythm: Normal rate and regular rhythm. Heart sounds: Normal heart sounds. No murmur heard. No friction rub. No gallop. Pulmonary:      Effort: Pulmonary effort is normal. No respiratory distress. Breath sounds: Normal breath sounds. No stridor. No wheezing or rales. Abdominal:      General: Bowel sounds are normal. There is no distension. Palpations: Abdomen is soft. There is no mass. Tenderness: There is no abdominal tenderness. There is no guarding or rebound. Hernia: A hernia is present. Hernia is present in the right inguinal area (small/medium sized reducible). There is no hernia in the ventral area or left inguinal area. Genitourinary:     Penis: Normal and circumcised. Testes: Cremasteric reflex is present. Musculoskeletal:         General: No tenderness. Normal range of motion. Cervical back: Normal range of motion and neck supple. Lymphadenopathy:      Cervical: No cervical adenopathy. Skin:     General: Skin is warm and dry. Findings: No erythema or rash. Neurological:      Mental Status: He is alert and oriented to person, place, and time. Cranial Nerves: No cranial nerve deficit. Coordination: Coordination normal.   Psychiatric:         Behavior: Behavior normal.         Thought Content: Thought content normal.         Judgment: Judgment normal.         ASSESSMENT and PLAN  1. Symptomatic right inguinal hernia.    I explained about the anatomy and pathophysiology of hernias and the risk of incarceration and strangulation of the bowel. I explained about hernia repairs (open with and without mesh, and robotic assisted and laparoscopic with mesh). I explained the risks and benefits of repair including bleeding, infection, chronic pain, orchalgia, loss of testes, bowel or bladder injury, hernia recurrence, seroma, mesh infection requiring removal.  I explained it would be a six to eight week recuperation with no driving for 5 - 7 days, no lifting for six weeks. 2. Hx TIA  On statin  3. Essential hypertension. Stable on rx  4. Insomnia. On rx  5. GERD improved on rx  6. Fibromyalgia. On rx  7. Has had Covid-19 twice and is not vaccinated  8. Tobacco abuse. Recommended cessation  9. Crohns disease. Currently quiescent          He wishes to proceed with a right inguinal hernia repair with mesh under MAC anesthesia as an outpatient     He wishes to hold off until mid June    The patient was counseled at length about the risks of leticia Covid-19 in the davi-operative and post-operative states including the recovery window of their procedure. The patient was made aware that leticia Covid-19 after a surgical procedure may worsen their prognosis for recovering from the virus and lend to a higher morbidity and or mortality risk. The patient was given the options of postponing their procedure. All of the risks, benefits, and alternatives were discussed. The patient  wishes to proceed with the procedure.       Jennifer Villanueva MD FACS

## 2022-03-23 NOTE — PROGRESS NOTES
Identified pt with two pt identifiers(name and ). Reviewed record in preparation for visit and have obtained necessary documentation. All patient medications has been reviewed. Chief Complaint   Patient presents with    Possible Hernia     Seen at the request of Garrett Neal for right ing hernia       Health Maintenance Due   Topic    COVID-19 Vaccine (1)    Pneumococcal 0-64 years (1 of 2 - PPSV23)    Shingrix Vaccine Age 50> (1 of 2)    Flu Vaccine (1)       Vitals:    22 1412   BP: 118/68   Pulse: 78   Resp: 16   Temp: 97.5 °F (36.4 °C)   TempSrc: Temporal   SpO2: 98%   Weight: 92.5 kg (204 lb)   Height: 6' 1\" (1.854 m)   PainSc:   0 - No pain       4. Have you been to the ER, urgent care clinic since your last visit? Hospitalized since your last visit? No    5. Have you seen or consulted any other health care providers outside of the [address] [place] [address] since your last visit? Include any pap smears or colon screening. No      Patient is accompanied by spouse I have received verbal consent from Loy Basilio. to discuss any/all medical information while they are present in the room.

## 2022-03-24 PROBLEM — K40.90 RIGHT INGUINAL HERNIA: Status: ACTIVE | Noted: 2022-03-23

## 2022-04-20 DIAGNOSIS — F51.04 CHRONIC INSOMNIA: ICD-10-CM

## 2022-04-20 RX ORDER — ZOLPIDEM TARTRATE 12.5 MG/1
12.5 TABLET, FILM COATED, EXTENDED RELEASE ORAL
Qty: 30 TABLET | Refills: 0 | Status: SHIPPED | OUTPATIENT
Start: 2022-04-20 | End: 2022-05-18 | Stop reason: SDUPTHER

## 2022-04-20 NOTE — TELEPHONE ENCOUNTER
Requested Prescriptions     Pending Prescriptions Disp Refills    zolpidem CR (AMBIEN CR) 12.5 mg tablet 30 Tablet 0     Sig: Take 1 Tablet by mouth nightly. Max Daily Amount: 12.5 mg.        Last Refill: 3/21/22  Next Appointment:9/12/22

## 2022-05-04 NOTE — PERIOP NOTES
Call to Dr. Jonas Wheatley office. Pt is on ASA 81mg po daily rx'd by PCP for hx of TIA. Will need PCP clearance documented in [facility] re ASA hold for surgery. Spoke with [employer], she will obtain.

## 2022-05-05 DIAGNOSIS — R06.83 SNORING: Primary | ICD-10-CM

## 2022-05-05 DIAGNOSIS — R53.82 CHRONIC FATIGUE: ICD-10-CM

## 2022-05-05 NOTE — PERIOP NOTES
[facility]  Ambulatory Surgery Unit  Pre-operative Instructions    Surgery/Procedure Date  Monday May 16th            Tentative Arrival Time TBD      1. On the day of your surgery/procedure, please report to the Ambulatory Surgery Unit Registration Desk and sign in at your designated time. The Ambulatory Surgery Unit is located in [facility] on the [facility] side of the [facility] [place] across from the [address] [place] [address]. Please have all of your health insurance cards and a photo ID.    **TWO adults may accompany you the day of the procedure. We have limited seating available. If our waiting room is at capacity, your ride may be asked to remain in their vehicle. No one under 15 is allowed in the waiting room. Masks, fully covering the mouth and nose, are required in the waiting room. 2. You must have someone with you to drive you home, as you should not drive a car for 24 hours following anesthesia. Please make arrangements for a responsible adult friend or family member to stay with you for at least the first 24 hours after your surgery. 3. Do not have anything to eat or drink (including water, gum, mints, coffee, juice) after 11:59 PM  Ghassan May 15th. This may not apply to medications prescribed by your physician. (Please note below the special instructions with medications to take the morning of surgery, if applicable.)    4. We recommend you do not drink any alcoholic beverages for 24 hours before and after your surgery. 5. Contact your surgeons office for instructions on the following medications: non-steroidal anti-inflammatory drugs (i.e. Advil, Aleve), vitamins, and supplements. (Some surgeons will want you to stop these medications prior to surgery and others may allow you to take them)   **If you are currently taking Plavix, Coumadin, Aspirin and/or other blood-thinning agents, contact your surgeon for instructions. ** Your surgeon will partner with the physician prescribing these medications to determine if it is safe to stop or if you need to continue taking. Please do not stop taking these medications without instructions from your surgeon. 6. In an effort to help prevent surgical site infection, we ask that you shower with an anti-bacterial soap (i.e. Dial/Safeguard, or the soap provided to you at your preadmission testing appointment) for 3 days prior to and on the morning of surgery, using a fresh towel after each shower. (Please begin this process with fresh bed linens.) Do not apply any lotions, powders, or deodorants after the shower on the day of your procedure. If applicable, please do not shave the operative site for 48 hours prior to surgery. 7. Wear comfortable clothes. Wear glasses instead of contacts. Do not bring any jewelry or money (other than copays or fees as instructed). Do not wear make-up, particularly mascara, the morning of your surgery. Do not wear nail polish, particularly if you are having foot /hand surgery. Wear your hair loose or down, no ponytails, buns, sonya pins or clips. All body piercings must be removed. 8. You should understand that if you do not follow these instructions your surgery may be cancelled. If your physical condition changes (i.e. fever, cold or flu) please contact your surgeon as soon as possible. 9. It is important that you be on time. If a situation occurs where you may be late, or if you have any questions or problems, please call [phone].    10. Your surgery time may be subject to change. You will receive a phone call the day prior to surgery to confirm your arrival time.     11. Pediatric patients: please bring a change of clothes, diapers, bottle/sippy cup, pacifier, etc.      Special Instructions: Hold aspirin for seven days prior to surgery per note from Dr Wally Raza in [address] [place] [address] 5/5/2022    Take all medications and inhalers, as prescribed, on the morning of surgery with a sip of water EXCEPT: aspirin Insulin Dependent Diabetic patients: Take your diabetic medications as prescribed the day before surgery. Hold all diabetic medications the day of surgery. If you are scheduled to arrive for surgery after 8:00 AM, and your AM blood sugar is >200, please call Ambulatory Surgery. I understand a pre-operative phone call will be made to verify my surgery time. In the event that I am not available, I give permission for a message to be left on my answering service and/or with another person?       YES    Reviewed instructions with patient wife at pt request, wife verbalized understanding         ___________________      ___________________      ________________  (Signature of Patient)          (Witness)                   (Date and Time)

## 2022-05-09 ENCOUNTER — TELEPHONE (OUTPATIENT)
Dept: SLEEP MEDICINE | Age: 62
End: 2022-05-09

## 2022-05-09 NOTE — TELEPHONE ENCOUNTER
Phoned the patient to schedule a sleep consult per Dr. Jose Manuel Hollins for fatigue and snoring. His spouse stated that they have never seen Dr. Lexie Mancini and did not wish to schedule at this time.

## 2022-05-16 ENCOUNTER — ANESTHESIA (OUTPATIENT)
Dept: SURGERY | Age: 62
End: 2022-05-16
Payer: COMMERCIAL

## 2022-05-16 ENCOUNTER — ANESTHESIA EVENT (OUTPATIENT)
Dept: SURGERY | Age: 62
End: 2022-05-16
Payer: COMMERCIAL

## 2022-05-16 ENCOUNTER — HOSPITAL ENCOUNTER (OUTPATIENT)
Age: 62
Setting detail: OUTPATIENT SURGERY
Discharge: HOME | End: 2022-05-16
Attending: SURGERY | Admitting: SURGERY
Payer: COMMERCIAL

## 2022-05-16 VITALS
HEART RATE: 81 BPM | SYSTOLIC BLOOD PRESSURE: 119 MMHG | RESPIRATION RATE: 18 BRPM | OXYGEN SATURATION: 95 % | DIASTOLIC BLOOD PRESSURE: 90 MMHG | HEIGHT: 73 IN | TEMPERATURE: 98.2 F | BODY MASS INDEX: 26.37 KG/M2 | WEIGHT: 199 LBS

## 2022-05-16 DIAGNOSIS — K40.90 RIGHT INGUINAL HERNIA: Primary | ICD-10-CM

## 2022-05-16 PROCEDURE — 74011000250 HC RX REV CODE- 250: Performed by: SURGERY

## 2022-05-16 PROCEDURE — 74011000250 HC RX REV CODE- 250: Performed by: NURSE ANESTHETIST, CERTIFIED REGISTERED

## 2022-05-16 PROCEDURE — 77030011265 HC ELECTRD BLD HEX COVD -A: Performed by: SURGERY

## 2022-05-16 PROCEDURE — 77030010507 HC ADH SKN DERMBND J&J -B: Performed by: SURGERY

## 2022-05-16 PROCEDURE — 77030002996 HC SUT SLK J&J -A: Performed by: SURGERY

## 2022-05-16 PROCEDURE — 77030031139 HC SUT VCRL2 J&J -A: Performed by: SURGERY

## 2022-05-16 PROCEDURE — 74011250636 HC RX REV CODE- 250/636: Performed by: SURGERY

## 2022-05-16 PROCEDURE — 76210000046 HC AMBSU PH II REC FIRST 0.5 HR: Performed by: SURGERY

## 2022-05-16 PROCEDURE — C1781 MESH (IMPLANTABLE): HCPCS | Performed by: SURGERY

## 2022-05-16 PROCEDURE — 76030000000 HC AMB SURG OR TIME 0.5 TO 1: Performed by: SURGERY

## 2022-05-16 PROCEDURE — 2709999900 HC NON-CHARGEABLE SUPPLY: Performed by: SURGERY

## 2022-05-16 PROCEDURE — 76060000061 HC AMB SURG ANES 0.5 TO 1 HR: Performed by: SURGERY

## 2022-05-16 PROCEDURE — 76210000040 HC AMBSU PH I REC FIRST 0.5 HR: Performed by: SURGERY

## 2022-05-16 PROCEDURE — 88302 TISSUE EXAM BY PATHOLOGIST: CPT

## 2022-05-16 PROCEDURE — 74011250636 HC RX REV CODE- 250/636: Performed by: ANESTHESIOLOGY

## 2022-05-16 PROCEDURE — 49505 PRP I/HERN INIT REDUC >5 YR: CPT | Performed by: SURGERY

## 2022-05-16 PROCEDURE — 74011250636 HC RX REV CODE- 250/636: Performed by: NURSE ANESTHETIST, CERTIFIED REGISTERED

## 2022-05-16 PROCEDURE — 77030002986 HC SUT PROL J&J -A: Performed by: SURGERY

## 2022-05-16 DEVICE — BARD SOFT MESH, 3" X 6" (7.5 CM X 15 CM)
Type: IMPLANTABLE DEVICE | Site: GROIN | Status: FUNCTIONAL
Brand: BARD

## 2022-05-16 RX ORDER — BUPIVACAINE HYDROCHLORIDE 5 MG/ML
INJECTION, SOLUTION EPIDURAL; INTRACAUDAL AS NEEDED
Status: DISCONTINUED | OUTPATIENT
Start: 2022-05-16 | End: 2022-05-16 | Stop reason: HOSPADM

## 2022-05-16 RX ORDER — PROPOFOL 10 MG/ML
INJECTION, EMULSION INTRAVENOUS AS NEEDED
Status: DISCONTINUED | OUTPATIENT
Start: 2022-05-16 | End: 2022-05-16 | Stop reason: HOSPADM

## 2022-05-16 RX ORDER — LIDOCAINE HYDROCHLORIDE 20 MG/ML
INJECTION, SOLUTION EPIDURAL; INFILTRATION; INTRACAUDAL; PERINEURAL AS NEEDED
Status: DISCONTINUED | OUTPATIENT
Start: 2022-05-16 | End: 2022-05-16 | Stop reason: HOSPADM

## 2022-05-16 RX ORDER — MIDAZOLAM HYDROCHLORIDE 1 MG/ML
INJECTION, SOLUTION INTRAMUSCULAR; INTRAVENOUS AS NEEDED
Status: DISCONTINUED | OUTPATIENT
Start: 2022-05-16 | End: 2022-05-16 | Stop reason: HOSPADM

## 2022-05-16 RX ORDER — ONDANSETRON 2 MG/ML
INJECTION INTRAMUSCULAR; INTRAVENOUS AS NEEDED
Status: DISCONTINUED | OUTPATIENT
Start: 2022-05-16 | End: 2022-05-16 | Stop reason: HOSPADM

## 2022-05-16 RX ORDER — FENTANYL CITRATE 50 UG/ML
INJECTION, SOLUTION INTRAMUSCULAR; INTRAVENOUS AS NEEDED
Status: DISCONTINUED | OUTPATIENT
Start: 2022-05-16 | End: 2022-05-16 | Stop reason: HOSPADM

## 2022-05-16 RX ORDER — OXYCODONE AND ACETAMINOPHEN 5; 325 MG/1; MG/1
1 TABLET ORAL
Qty: 12 TABLET | Refills: 0 | Status: SHIPPED | OUTPATIENT
Start: 2022-05-16 | End: 2022-05-19

## 2022-05-16 RX ORDER — IBUPROFEN 800 MG/1
800 TABLET ORAL
Qty: 30 TABLET | Refills: 0 | Status: SHIPPED | OUTPATIENT
Start: 2022-05-16 | End: 2022-05-26

## 2022-05-16 RX ORDER — PROPOFOL 10 MG/ML
INJECTION, EMULSION INTRAVENOUS
Status: DISCONTINUED | OUTPATIENT
Start: 2022-05-16 | End: 2022-05-16 | Stop reason: HOSPADM

## 2022-05-16 RX ORDER — POLYETHYLENE GLYCOL 3350 17 G/17G
17 POWDER, FOR SOLUTION ORAL 2 TIMES DAILY
Qty: 60 PACKET | Refills: 0 | Status: SHIPPED | OUTPATIENT
Start: 2022-05-16 | End: 2022-06-15

## 2022-05-16 RX ORDER — SODIUM CHLORIDE, SODIUM LACTATE, POTASSIUM CHLORIDE, CALCIUM CHLORIDE 600; 310; 30; 20 MG/100ML; MG/100ML; MG/100ML; MG/100ML
25 INJECTION, SOLUTION INTRAVENOUS CONTINUOUS
Status: DISCONTINUED | OUTPATIENT
Start: 2022-05-16 | End: 2022-05-16 | Stop reason: HOSPADM

## 2022-05-16 RX ORDER — DEXMEDETOMIDINE HYDROCHLORIDE 100 UG/ML
INJECTION, SOLUTION INTRAVENOUS AS NEEDED
Status: DISCONTINUED | OUTPATIENT
Start: 2022-05-16 | End: 2022-05-16 | Stop reason: HOSPADM

## 2022-05-16 RX ADMIN — FENTANYL CITRATE 50 MCG: 50 INJECTION, SOLUTION INTRAMUSCULAR; INTRAVENOUS at 14:35

## 2022-05-16 RX ADMIN — PROPOFOL 50 MG: 10 INJECTION, EMULSION INTRAVENOUS at 14:29

## 2022-05-16 RX ADMIN — FENTANYL CITRATE 50 MCG: 50 INJECTION, SOLUTION INTRAMUSCULAR; INTRAVENOUS at 14:43

## 2022-05-16 RX ADMIN — LIDOCAINE HYDROCHLORIDE 60 MG: 20 INJECTION, SOLUTION EPIDURAL; INFILTRATION; INTRACAUDAL; PERINEURAL at 14:29

## 2022-05-16 RX ADMIN — PROPOFOL INJECTABLE EMULSION 140 MCG/KG/MIN: 10 INJECTION, EMULSION INTRAVENOUS at 14:35

## 2022-05-16 RX ADMIN — DEXMEDETOMIDINE HYDROCHLORIDE 4 MCG: 100 INJECTION, SOLUTION, CONCENTRATE INTRAVENOUS at 14:36

## 2022-05-16 RX ADMIN — WATER 2 G: 1 INJECTION INTRAMUSCULAR; INTRAVENOUS; SUBCUTANEOUS at 14:30

## 2022-05-16 RX ADMIN — ONDANSETRON HYDROCHLORIDE 4 MG: 2 INJECTION, SOLUTION INTRAMUSCULAR; INTRAVENOUS at 15:06

## 2022-05-16 RX ADMIN — SODIUM CHLORIDE, POTASSIUM CHLORIDE, SODIUM LACTATE AND CALCIUM CHLORIDE 25 ML/HR: 600; 310; 30; 20 INJECTION, SOLUTION INTRAVENOUS at 13:53

## 2022-05-16 RX ADMIN — DEXMEDETOMIDINE HYDROCHLORIDE 4 MCG: 100 INJECTION, SOLUTION, CONCENTRATE INTRAVENOUS at 14:46

## 2022-05-16 RX ADMIN — MIDAZOLAM HYDROCHLORIDE 2 MG: 1 INJECTION, SOLUTION INTRAMUSCULAR; INTRAVENOUS at 14:23

## 2022-05-16 NOTE — PERIOP NOTES
Permission received to review discharge instructions and discuss private health information with wife, Leandro Draper. Patient states that wife will be with them for at least 24 hours following today's procedure. Mistral-Air warming blanket applied at this time. Set to appropriate setting that is comfortable to patient. Will continue to monitor.

## 2022-05-16 NOTE — ANESTHESIA PREPROCEDURE EVALUATION
Relevant Problems   NEUROLOGY   (+) Depression, major, recurrent, mild (HCC)   (+) TIA (transient ischemic attack)   (+) Transient ischemic attack involving right internal carotid artery      CARDIOVASCULAR   (+) Essential hypertension      GASTROINTESTINAL   (+) Gastroesophageal reflux disease without esophagitis      ENDOCRINE   (+) Hypothyroidism due to acquired atrophy of thyroid   (+) Reactive arthritis (HCC)       Anesthetic History   No history of anesthetic complications            Review of Systems / Medical History  Patient summary reviewed, nursing notes reviewed and pertinent labs reviewed    Pulmonary          Smoker (1.5 ppd)      Comments: Sarcoidosis  No recent exacerbation  STOP BANG 4   Neuro/Psych         TIA and psychiatric history    Comments: Has some short-term memory problems post TIA Cardiovascular    Hypertension              Exercise tolerance: >4 METS  Comments: ·09/19 ECHO= Left Ventricle: Normal cavity size and systolic function (ejection fraction normal). Mild concentric hypertrophy. Estimated left ventricular ejection fraction is 61 - 65%. Age-appropriate left ventricular diastolic function. · Interatrial Septum: Agitated saline contrast study was performed. · No atrial septal defect present. · Mitral Valve: Trace mitral valve regurgitation is present.     Carotids dopplers: minimal dz < 15%   GI/Hepatic/Renal     GERD: well controlled          Comments: Crohn's Dz Endo/Other      Hypothyroidism: well controlled  Arthritis     Other Findings   Comments: Right inguinal hernia         Physical Exam    Airway  Mallampati: II  TM Distance: 4 - 6 cm  Neck ROM: normal range of motion   Mouth opening: Normal     Cardiovascular    Rhythm: regular  Rate: normal         Dental  No notable dental hx       Pulmonary  Breath sounds clear to auscultation               Abdominal  GI exam deferred       Other Findings            Anesthetic Plan    ASA: 3  Anesthesia type: MAC and general - backup          Induction: Intravenous  Anesthetic plan and risks discussed with: Patient

## 2022-05-16 NOTE — ANESTHESIA POSTPROCEDURE EVALUATION
Procedure(s):  RIGHT INGUINAL HERNIA REPAIR WITH MESH.     MAC, general - backup    Anesthesia Post Evaluation      Multimodal analgesia: multimodal analgesia used between 6 hours prior to anesthesia start to PACU discharge  Patient location during evaluation: PACU  Patient participation: complete - patient participated  Level of consciousness: awake and alert  Pain management: satisfactory to patient  Airway patency: patent  Anesthetic complications: no  Cardiovascular status: acceptable  Respiratory status: acceptable  Hydration status: acceptable  Post anesthesia nausea and vomiting:  none  Final Post Anesthesia Temperature Assessment:  Normothermia (36.0-37.5 degrees C)      INITIAL Post-op Vital signs:   Vitals Value Taken Time   /90 05/16/22 1545   Temp 36.8 °C (98.2 °F) 05/16/22 1524   Pulse 81 05/16/22 1545   Resp 18 05/16/22 1545   SpO2 95 % 05/16/22 1545

## 2022-05-16 NOTE — H&P
HISTORY OF PRESENT ILLNESS  Teresa Castaneda is a 64 y.o. male who comes in for consultation by Jessie De Jesus MD for a hernia  HPI  He has noted right groin swelling and discomfort since 9/2019. It has slowly been getting larger and more uncomfortable. It does go away when he lays down. He has not done any therapy for it. He has not had anything similar in the past but did have a left orchiectomy for torsion. He denies nausea, vomiting, constipation, melena, hematochezia, dysuria or hematuria. He has crohns disease and has some diarrhea at times but is currently not on any therapy.             Past Medical History:   Diagnosis Date    Chronic insomnia 8/16/2018    Colon polyps 8/14/2018    Crohn's disease ([address] 8/14/2018    Depression 8/14/2018    Diverticulosis 8/14/2018    Fibromyalgia 8/14/2018    Gastroesophageal reflux disease without esophagitis 8/16/2018    IBS (irritable bowel syndrome) 8/14/2018    Onychomycosis 8/14/2018    Reactive arthritis ([address] 8/14/2018    Sarcoidosis 8/14/2018            Past Surgical History:   Procedure Laterality Date    HX ORCHIECTOMY Left      HX ORTHOPAEDIC         L3-5 lumbar surgery    HX TONSILLECTOMY                Family History   Problem Relation Age of Onset    Hypertension Mother      Hypertension Father      Prostate Cancer Father      Stroke Father      Hypertension Brother      Hypertension Paternal Grandfather      Colon Cancer Paternal Grandfather      Colon Cancer Paternal Uncle      No Known Problems Child        Social History            Tobacco Use    Smoking status: Current Every Day Smoker       Packs/day: 1.50    Smokeless tobacco: Never Used   Vaping Use    Vaping Use: Never used   Substance Use Topics    Alcohol use: No    Drug use: Never           Current Outpatient Medications   Medication Sig    traZODone (DESYREL) 50 mg tablet Take 1 Tablet by mouth nightly.     zolpidem CR (AMBIEN CR) 12.5 mg tablet Take 1 Tablet by mouth nightly. Max Daily Amount: 12.5 mg.    DULoxetine (CYMBALTA) 60 mg capsule TAKE 1 CAPSULE BY MOUTH EVERY DAY    lisinopriL (PRINIVIL, ZESTRIL) 20 mg tablet TAKE 1 TABLET BY MOUTH EVERY DAY    pravastatin (PRAVACHOL) 40 mg tablet TAKE 1 TABLET BY MOUTH EVERY DAY AT NIGHT    aspirin 81 mg chewable tablet TAKE 1 TABLET BY MOUTH EVERY DAY    Dexlansoprazole (DEXILANT) 60 mg CpDB Take  by mouth daily.      No current facility-administered medications for this visit.           Allergies   Allergen Reactions    Remicade [Infliximab] Other (comments)         Review of Systems   Constitutional: Negative for chills, diaphoresis, fever, malaise/fatigue and weight loss. HENT: Negative for congestion, ear pain and sore throat. Eyes: Negative for blurred vision and pain. Respiratory: Positive for cough. Negative for hemoptysis, sputum production, shortness of breath, wheezing and stridor. Sleep apnea   Cardiovascular: Negative for chest pain, palpitations, orthopnea, claudication, leg swelling and PND. Gastrointestinal: Positive for abdominal pain. Negative for blood in stool, constipation, diarrhea, heartburn, melena, nausea and vomiting. Genitourinary: Negative for dysuria, flank pain, frequency, hematuria and urgency. Musculoskeletal: Positive for back pain and joint pain. Negative for myalgias and neck pain. Skin: Negative for itching and rash. Neurological: Negative for dizziness, tremors, focal weakness, seizures, weakness and headaches. Hx TIA but no residual   Endo/Heme/Allergies: Negative for polydipsia. Psychiatric/Behavioral: Negative for depression and memory loss.  The patient is not nervous/anxious.       Visit Vitals  /68 (BP 1 Location: Left upper arm, BP Patient Position: Sitting, BP Cuff Size: Adult)   Pulse 78   Temp 97.5 °F (36.4 °C) (Temporal)   Resp 16   Ht 6' 1\" (1.854 m)   Wt 92.5 kg (204 lb)   SpO2 98%   BMI 26.91 kg/m²         Physical Exam  Constitutional:       General: He is not in acute distress. Appearance: Normal appearance. He is well-developed. He is not diaphoretic. HENT:      Head: Normocephalic and atraumatic. Mouth/Throat:      Pharynx: No oropharyngeal exudate. Eyes:      General: No scleral icterus. Conjunctiva/sclera: Conjunctivae normal.      Pupils: Pupils are equal, round, and reactive to light. Neck:      Thyroid: No thyromegaly. Trachea: No tracheal deviation. Cardiovascular:      Rate and Rhythm: Normal rate and regular rhythm. Heart sounds: Normal heart sounds. No murmur heard. No friction rub. No gallop. Pulmonary:      Effort: Pulmonary effort is normal. No respiratory distress. Breath sounds: Normal breath sounds. No stridor. No wheezing or rales. Abdominal:      General: Bowel sounds are normal. There is no distension. Palpations: Abdomen is soft. There is no mass. Tenderness: There is no abdominal tenderness. There is no guarding or rebound. Hernia: A hernia is present. Hernia is present in the right inguinal area (small/medium sized reducible). There is no hernia in the ventral area or left inguinal area. Genitourinary:     Penis: Normal and circumcised. Testes: Cremasteric reflex is present. Musculoskeletal:         General: No tenderness. Normal range of motion. Cervical back: Normal range of motion and neck supple. Lymphadenopathy:      Cervical: No cervical adenopathy. Skin:     General: Skin is warm and dry. Findings: No erythema or rash. Neurological:      Mental Status: He is alert and oriented to person, place, and time. Cranial Nerves: No cranial nerve deficit. Coordination: Coordination normal.   Psychiatric:         Behavior: Behavior normal.         Thought Content: Thought content normal.         Judgment: Judgment normal.            ASSESSMENT and PLAN  1. Symptomatic right inguinal hernia.    I explained about the anatomy and pathophysiology of hernias and the risk of incarceration and strangulation of the bowel. I explained about hernia repairs (open with and without mesh, and robotic assisted and laparoscopic with mesh). I explained the risks and benefits of repair including bleeding, infection, chronic pain, orchalgia, loss of testes, bowel or bladder injury, hernia recurrence, seroma, mesh infection requiring removal.  I explained it would be a six to eight week recuperation with no driving for 5 - 7 days, no lifting for six weeks.       2. Hx TIA  On statin  3. Essential hypertension. Stable on rx  4. Insomnia. On rx  5. GERD improved on rx  6. Fibromyalgia. On rx  7. Has had Covid-19 twice and is not vaccinated  8. Tobacco abuse. Recommended cessation  9. Crohns disease. Currently quiescent              He wishes to proceed with a right inguinal hernia repair with mesh under MAC anesthesia as an outpatient           The patient was counseled at length about the risks of leticia Covid-19 in the davi-operative and post-operative states including the recovery window of their procedure.  The patient was made aware that leticia Covid-19 after a surgical procedure may worsen their prognosis for recovering from the virus and lend to a higher morbidity and or mortality risk.  The patient was given the options of postponing their procedure. All of the risks, benefits, and alternatives were discussed.  The patient  wishes to proceed with the procedure.        Paulino Randle MD FACS

## 2022-05-16 NOTE — PERIOP NOTES
Anais Gonzalez.  1960  [id number]    Situation:  Verbal report given from: Ameena Bynum CRNA and VICKIE Wesley RN  Procedure: Procedure(s):  RIGHT INGUINAL HERNIA REPAIR WITH MESH    Background:    Preoperative diagnosis: RIGHT INGUINAL HERNIA    Postoperative diagnosis: RIGHT INGUINAL HERNIA    :  Dr. Chadwick Ann    Assistant(s): Circ-1: Montana Restrepo RN  Scrub Tech-1: Lorrane Plate  Surg Asst-1: So Papa    Specimens:   ID Type Source Tests Collected by Time Destination   1 : Inguinal hernia sac and lipoma of the cord Preservative Hernia Sac  Natalya Yeh MD 5/16/2022 3381 Pathology       Assessment:  Intra-procedure medications         Anesthesia gave intra-procedure sedation and medications, see anesthesia flow sheet     Intravenous fluids: LR@ KVO     Vital signs stable       Recommendation:    Permission to share finding with wife Francois

## 2022-05-16 NOTE — BRIEF OP NOTE
Brief Postoperative Note    Patient: Nicole Henry. YOB: 1960  MRN: [id number]    Date of Procedure: 5/16/2022     Pre-Op Diagnosis: RIGHT INGUINAL HERNIA    Post-Op Diagnosis: Same as preoperative diagnosis. Procedure(s):  RIGHT INGUINAL HERNIA REPAIR WITH MESH    Surgeon(s):  Joe Khan MD    Surgical Assistant: Surg Asst-1: Eloy Phoenix    Anesthesia: MAC     Estimated Blood Loss (mL): Minimal    Complications: None    Specimens:   ID Type Source Tests Collected by Time Destination   1 : Inguinal hernia sac and lipoma of the cord Preservative Hernia Sac  Joe Khan MD 5/16/2022 1458 Pathology        Implants:   Implant Name Type Inv.  Item Serial No.  Lot No. LRB No. Used Action   MESH TATY [id number] INGUINAL POLYPR SYN FLAT L PORE MFIL - SN/A  MESH TATY [id number] INGUINAL POLYPR SYN FLAT L PORE MFIL N/A BARD DAVOL_WD [id number] 1 Implanted       Drains: * No LDAs found *    Findings: direct and indirect    Electronically Signed by Gricelda Nails MD on 5/16/2022 at 3:16 PM

## 2022-05-16 NOTE — DISCHARGE INSTRUCTIONS
Discharge Instructions:  Hernia Repair    Dr. Elena Mathews    Call for appointment for follow up in 2 weeks [id number]    Activity:    Walk regularly. No lifting more than 10 pounds for 6 weeks. Light aerobic activity is okay when you feel up to it. You may resume driving in five days unless still requiring narcotics for pain. Work:    You may return to work in 2 or 3 weeks to light activity. No lifting more than 5 pounds for four weeks and no more than 10 pounds for an additional 2 weeks. Diet:    You may resume normal diet after 24 hours. Anesthesia and narcotics may cause nausea and vomiting. If persistent please call the office. Wound Care: You have a special dressing called Dermabond. It is okay to shower and let the water run over the incisions but do not scrub the area or soak in a tub. If you have a small amount of drainage you may place a dry bandage over the wound and change it daily. If you experience a lot of drainage, develop redness around the wound, or a fever over 101 F occurs please call the office. Use ice over incision for 20 minutes every 1-2 hours to reduce swelling and discomfort. Medications:    Resume home medications as indicated on the Medical Reconciliation form. Aspirin and Coumadin can be restarted immediately if you were taking them preoperatively. If taking Plavix do not restart it until post operative day 2. Pain medications:  Non steroidal antiinflammatories seem to work best for post surgical pain. Try these first as prescribed. A narcotic prescription will also be given for breakthrough pain. Over the counter stool softeners and laxatives may be used if needed. Do not hesitate to call with questions or concerns. DO NOT TAKE TYLENOL/ACETAMINOPHEN WITH PERCOCET, [facility], [address]     TAKE NARCOTIC PAIN MEDICATIONS WITH FOOD     Narcotics tend to be constipating, we suggest taking a stool softener such as Colace or Miralax (follow package instructions). DO NOT DRIVE WHILE TAKING NARCOTIC PAIN MEDICATIONS. DO NOT TAKE SLEEPING MEDICATIONS OR ANTIANXIETY MEDICATIONS WHILE TAKING NARCOTIC PAIN MEDICATIONS,  ESPECIALLY THE NIGHT OF ANESTHESIA! CPAP PATIENTS BE SURE TO WEAR MACHINE WHENEVER NAPPING OR SLEEPING! DISCHARGE SUMMARY from Nurse    The following personal items collected during your admission are returned to you:   Dental Appliance: Dental Appliances: None  Vision: Visual Aid: Glasses (Glasses in PACU)  Hearing Aid:    Jewelry: Jewelry: None  Clothing: Clothing: With patient  Other Valuables: Other Valuables: Eyeglasses (Glasses in PACU)  Valuables sent to safe:        PATIENT INSTRUCTIONS:    After General Anesthesia or Intravenous Sedation, for 24 hours or while taking prescription Narcotics:        Someone should be with you for the next 24 hours. For your own safety, a responsible adult must drive you home. · Limit your activities  · Recommended activity: Rest today, up with assistance today. Do not climb stairs or shower unattended for the next 24 hours. · Please start with a soft bland diet and advance as tolerated (no nausea) to regular diet. · If you have a sore throat you should try the following: fluids, warm salt water gargles, or throat lozenges. If it does not improve after several days please follow up with your primary physician. · Do not drive and operate hazardous machinery  · Do not make important personal or business decisions  · Do  not drink alcoholic beverages  · If you have not urinated within 8 hours after discharge, please contact your surgeon on call.     Report the following to your surgeon:  · Excessive pain, swelling, redness or odor of or around the surgical area  · Temperature over 100.5  · Nausea and vomiting lasting longer than 4 hours or if unable to take medications  · Any signs of decreased circulation or nerve impairment to extremity: change in color, persistent  numbness, tingling, coldness or increase pain      · You will receive a Post Operative Call from one of the Recovery Room Nurses on the day after your surgery to check on you. It is very important for us to know how you are recovering after your surgery. If you have an issue or need to speak with someone, please call your surgeon, do not wait for the post operative call. · You may receive an e-mail or letter in the mail from [employer] regarding your experience with us in the Ambulatory Surgery Unit. Your feedback is valuable to us and we appreciate your participation in the survey. · If the above instructions are not adequate or you are having problems after your surgery, call the physician at their office number. · We wish you a speedy recovery ? What to do at Home:      *  Please give a list of your current medications to your Primary Care Provider. *  Please update this list whenever your medications are discontinued, doses are      changed, or new medications (including over-the-counter products) are added. *  Please carry medication information at all times in case of emergency situations. If you have not received your influenza and/or pneumococcal vaccine, please follow up with your primary care physician. The discharge information has been reviewed with the patient and caregiver. The patient and caregiver verbalized understanding. TO PREVENT AN INFECTION      1. [address] YOUR HANDS     To prevent infection, good handwashing is the most important thing you or your caregiver can do.  Wash your hands with soap and water or use the hand  we gave you before you touch any wounds. 2. SHOWER     Use the antibacterial soap we gave you when you take a shower.  Shower with this soap until your wounds are healed.  To reach all areas of your body, you may need someone to help you.  Dont forget to clean your belly button with every shower.     3.  USE CLEAN SHEETS     Use freshly cleaned sheets on your bed after surgery.  To keep the surgery site clean, do not allow pets to sleep with you while your wound is still healing. 4. STOP SMOKING     Stop smoking, or at least cut back on smoking     Smoking slows your healing.

## 2022-05-17 NOTE — OP NOTES
[address]  OPERATIVE REPORT    Name:  Carolina Prado  MR#:  [id number]  :  1960  ACCOUNT #:  [de-identified]  DATE OF SERVICE:  2022    PREOPERATIVE DIAGNOSIS:  Symptomatic right inguinal hernia. POSTOPERATIVE DIAGNOSIS:  Symptomatic right inguinal hernia. PROCEDURE PERFORMED:  Right inguinal hernia repair with mesh. SURGEON:  Jorje Ha MD    ASSISTANT:  Shelley Clements MD    ANESTHESIA:  MAC.    COMPLICATIONS:  None. SPECIMENS REMOVED:  Hernia sac and lipoma of the cord. IMPLANTS:  Bard Davol mesh 7.5 x 15 cm soft mesh, large pore, lot# [id number]    ESTIMATED BLOOD LOSS:  Minimal.    DRAINS:  None. FINDINGS:  Direct and indirect defects. BRIEF HISTORY:  The patient is a pleasant 57-year-old gentleman with symptomatic hernia. Options were discussed. He elected to undergo repair. He understands the risks and benefits and wished to proceed. These are outlined in my office notes. PROCEDURE:  The patient was taken to the operating room, placed on the operating table in the supine position, underwent IV sedation and the right groin was prepped and draped in the usual sterile fashion. After appropriate time-out and antibiotics were given, 1% lidocaine with epinephrine was infiltrated in the skin and subcutaneous tissues 1 cm medially and 1 cm superiorly to the anterior superior iliac crest for regional nerve block. Then more local was infiltrated into the skin and subcutaneous tissue obliquely in the right groin overlying the inguinal ligament. An oblique incision was made in the right groin, subcutaneous tissue was dissected out with electrocautery and crossing vein was doubly ligated and divided. Dissection was carried down through Sarah's fascia and down through the aponeurosis of the external oblique. The aponeurosis was opened up along the fibers opening up the external ring and the cord was mobilized up.   We did use 0.5% Marcaine around the cord structures and the nerves directly. Placed a Penrose around the cord and elevated it up. I then dissected out the hernia sac from the cord as an indirect defect. It was moderate sized and we cautiously dissected away from the cord structures and a 0 silk pursestring was used for suture ligature with a high ligation of the hernia sac and a suture ligature placed distal to the sac and amputated off. There was also a bigger sized lipoma the cord that was resected away with electrocautery. Then there was noted to be a direct defect underneath as well. Took a piece of the Bard soft mesh and cut in a slit down the short end with creation of an internal ring. Interrupted zero Prolene sutures were used to sew the mesh down inferiorly to the fascia overlying the pubic tubercle and down to Brody's ligament and shelving edge of the inguinal ligament more superolaterally and medially sewn to the conjoint tendon. The tails of the mesh were then wrapped around the cord structures and sewn back to itself to create a snug but not tight internal ring. Hypogastric nerves were then dissected away and preserved throughout. I made sure that they were not entrapped in any of the sutures. Next, more Marcaine was flushed in the deeper tissues and running 3-0 Vicryl used to approximate the aponeurosis of the external oblique creating a new external ring. Another running layer of 3-0 Vicryl was used to approximate Sarah's fascia and subcutaneous tissues. Then running 4-0 Vicryl was used to close the skin and a Dermabond dressing was applied. Upon completion of the operation, the needle, sponge, and instrument counts were correct x2. The patient had tolerated the procedure well and was awoken and brought to recovery room. Junie Muse MD [id number]  D:  05/16/2022 15:21  T:  05/16/2022 21:07  JOB #:  [id number]  CC:  MD Jackelin Washington MD

## 2022-05-18 DIAGNOSIS — F51.04 CHRONIC INSOMNIA: ICD-10-CM

## 2022-05-18 NOTE — TELEPHONE ENCOUNTER
Requested Prescriptions     Pending Prescriptions Disp Refills    aspirin 81 mg chewable tablet 90 Tablet 1     Sig: Take 1 Tablet by mouth daily.  zolpidem CR (AMBIEN CR) 12.5 mg tablet 90 Tablet 0     Sig: Take 1 Tablet by mouth nightly. Max Daily Amount: 12.5 mg.      Next appt: 9/12/22 Dr German Diaz  Last refill: 12/6/21

## 2022-05-19 RX ORDER — ZOLPIDEM TARTRATE 12.5 MG/1
12.5 TABLET, FILM COATED, EXTENDED RELEASE ORAL
Qty: 30 TABLET | Refills: 0 | Status: SHIPPED | OUTPATIENT
Start: 2022-05-19 | End: 2022-06-17

## 2022-05-19 RX ORDER — GUAIFENESIN 100 MG/5ML
81 LIQUID (ML) ORAL DAILY
Qty: 90 TABLET | Refills: 0 | Status: SHIPPED | OUTPATIENT
Start: 2022-05-19

## 2022-05-31 ENCOUNTER — OFFICE VISIT (OUTPATIENT)
Dept: SURGERY | Age: 62
End: 2022-05-31
Payer: COMMERCIAL

## 2022-05-31 VITALS
SYSTOLIC BLOOD PRESSURE: 172 MMHG | HEIGHT: 73 IN | TEMPERATURE: 97.7 F | WEIGHT: 206.8 LBS | OXYGEN SATURATION: 96 % | BODY MASS INDEX: 27.41 KG/M2 | HEART RATE: 76 BPM | RESPIRATION RATE: 16 BRPM | DIASTOLIC BLOOD PRESSURE: 119 MMHG

## 2022-05-31 DIAGNOSIS — Z09 POSTOPERATIVE EXAMINATION: Primary | ICD-10-CM

## 2022-05-31 PROCEDURE — 99024 POSTOP FOLLOW-UP VISIT: CPT | Performed by: SURGERY

## 2022-05-31 NOTE — Clinical Note
5/31/2022    Patient: Chucky Burgos. YOB: 1960   Date of Visit: 5/31/2022     Tali Weldon MD  Via     Dear Tali Weldon MD,      Thank you for referring Mr. Marina Ortiz to [facility] [address] for evaluation. My notes for this consultation are attached. If you have questions, please do not hesitate to call me. I look forward to following your patient along with you.       Sincerely,    Dory Hernandez MD

## 2022-05-31 NOTE — PROGRESS NOTES
Identified pt with two pt identifiers(name and ). Reviewed record in preparation for visit and have obtained necessary documentation. Chief Complaint   Patient presents with    Surgical Follow-up     2 week PO r ing hr w/ mesh       Vitals:    22 1145 22 1152   BP: (!) 180/117 (!) 172/119   Pulse: 76    Resp: 16    Temp: 97.7 °F (36.5 °C)    TempSrc: Temporal    SpO2: 96%    Weight: 93.8 kg (206 lb 12.8 oz)    Height: 6' 1\" (1.854 m)    PainSc:   2    PainLoc: Groin        Health Maintenance Review: Patient reminded of \"due or due soon\" health maintenance. I have asked the patient to contact his/her primary care provider (PCP) for follow-up on his/her health maintenance. Coordination of Care Questionnaire:  :   1) Have you been to an emergency room, urgent care, or hospitalized since your last visit? If yes, where when, and reason for visit? no       2. Have seen or consulted any other health care provider since your last visit? If yes, where when, and reason for visit? NO      Patient is accompanied by patient and wife I have received verbal consent from [facility]. to discuss any/all medical information while they are present in the room.

## 2022-05-31 NOTE — PROGRESS NOTES
Surgery  Follow up  Procedure: right inguinal hernia repair with mesh  OR date:  5/16/2022  Path:  sac    S I feel fine, no diarrhea    Visit Vitals  BP (!) 172/119 (BP 1 Location: Right arm, BP Patient Position: Sitting)   Pulse 76   Temp 97.7 °F (36.5 °C) (Temporal)   Resp 16   Ht 6' 1\" (1.854 m)   Wt 93.8 kg (206 lb 12.8 oz)   SpO2 96%   BMI 27.28 kg/m²       O Incisions healing well without infection   No signs of hernia    A/P Doing well   No lifting for another 4 weeks   May RTW to light duty (10 pounds)   RTC 6 weeks or prn    Aris Giron MD FACS

## 2022-06-17 DIAGNOSIS — F51.04 CHRONIC INSOMNIA: ICD-10-CM

## 2022-06-17 RX ORDER — ZOLPIDEM TARTRATE 12.5 MG/1
TABLET, FILM COATED, EXTENDED RELEASE ORAL
Qty: 90 TABLET | Refills: 0 | Status: SHIPPED | OUTPATIENT
Start: 2022-06-17 | End: 2022-09-20

## 2022-07-22 DIAGNOSIS — M79.7 FIBROMYALGIA: ICD-10-CM

## 2022-07-22 RX ORDER — DULOXETIN HYDROCHLORIDE 60 MG/1
60 CAPSULE, DELAYED RELEASE ORAL DAILY
Qty: 90 CAPSULE | Refills: 3 | Status: SHIPPED | OUTPATIENT
Start: 2022-07-22

## 2022-07-22 RX ORDER — PRAVASTATIN SODIUM 40 MG/1
40 TABLET ORAL
Qty: 90 TABLET | Refills: 3 | Status: SHIPPED | OUTPATIENT
Start: 2022-07-22

## 2022-07-22 NOTE — TELEPHONE ENCOUNTER
PCP: Ravin Pepe MD    Last appt: 3/9/2022  Future Appointments   Date Time Provider Juan Antnoio Cruz   9/12/2022  9:30 AM Mayra Kenney MD [facility] AMB       Requested Prescriptions     Pending Prescriptions Disp Refills    pravastatin (PRAVACHOL) 40 mg tablet 90 Tablet 1     Sig: Take 1 Tablet by mouth nightly. DULoxetine (CYMBALTA) 60 mg capsule 90 Capsule 0     Sig: Take 1 Capsule by mouth in the morning.        Prior labs and Blood pressures:  BP Readings from Last 3 Encounters:   05/31/22 (!) 172/119   05/16/22 (!) 119/90   03/23/22 118/68     Lab Results   Component Value Date/Time    Sodium 141 09/08/2021 10:20 AM    Potassium 5.0 09/08/2021 10:20 AM    Chloride 111 (H) 09/08/2021 10:20 AM    CO2 28 09/08/2021 10:20 AM    Anion gap 2 (L) 09/08/2021 10:20 AM    Glucose 96 09/08/2021 10:20 AM    BUN 22 (H) 09/08/2021 10:20 AM    Creatinine 0.95 09/08/2021 10:20 AM    BUN/Creatinine ratio 23 (H) 09/08/2021 10:20 AM    GFR est AA >60 09/08/2021 10:20 AM    GFR est non-AA >60 09/08/2021 10:20 AM    Calcium 9.2 09/08/2021 10:20 AM     Lab Results   Component Value Date/Time    Hemoglobin A1c 5.8 09/22/2019 01:48 AM     Lab Results   Component Value Date/Time    Cholesterol, total 132 09/08/2021 10:20 AM    HDL Cholesterol 47 09/08/2021 10:20 AM    LDL, calculated 72.2 09/08/2021 10:20 AM    VLDL, calculated 12.8 09/08/2021 10:20 AM    Triglyceride 64 09/08/2021 10:20 AM    CHOL/HDL Ratio 2.8 09/08/2021 10:20 AM     Lab Results   Component Value Date/Time    Vitamin D 25-Hydroxy 39.5 02/11/2021 11:55 AM       Lab Results   Component Value Date/Time    TSH 0.93 09/08/2021 10:20 AM    TSH, 3rd generation 0.85 08/27/2020 10:21 AM

## 2022-08-11 DIAGNOSIS — I10 ESSENTIAL HYPERTENSION: Chronic | ICD-10-CM

## 2022-08-11 RX ORDER — LISINOPRIL 20 MG/1
20 TABLET ORAL DAILY
Qty: 90 TABLET | Refills: 1 | Status: SHIPPED | OUTPATIENT
Start: 2022-08-11 | End: 2022-11-09

## 2022-08-11 NOTE — TELEPHONE ENCOUNTER
PCP: Kait Lujan MD    Last appt: 3/9/2022  Future Appointments   Date Time Provider Juan Antonio Cruz   9/12/2022  9:30 AM Javier Retana MD [facility]       Requested Prescriptions     Pending Prescriptions Disp Refills    lisinopriL (PRINIVIL, ZESTRIL) 20 mg tablet 90 Tablet 1     Sig: Take 1 Tablet by mouth in the morning for 90 days.          Other Comments:

## 2022-09-17 DIAGNOSIS — F51.04 CHRONIC INSOMNIA: ICD-10-CM

## 2022-09-20 DIAGNOSIS — F51.04 CHRONIC INSOMNIA: ICD-10-CM

## 2022-09-20 RX ORDER — ZOLPIDEM TARTRATE 12.5 MG/1
12.5 TABLET, FILM COATED, EXTENDED RELEASE ORAL
Qty: 90 TABLET | Refills: 0 | OUTPATIENT
Start: 2022-09-20

## 2022-09-20 RX ORDER — ZOLPIDEM TARTRATE 12.5 MG/1
TABLET, FILM COATED, EXTENDED RELEASE ORAL
Qty: 90 TABLET | Refills: 0 | Status: SHIPPED | OUTPATIENT
Start: 2022-09-20

## 2022-09-20 NOTE — TELEPHONE ENCOUNTER
PCP: Denis Hernandez MD    Last appt: 3/9/2022  Future Appointments   Date Time Provider Juan Antonio Cruz   10/7/2022  9:40 AM LAB ONLY [facility]   10/14/2022  3:45 PM MD NATALIA Aburto BS AMB       Requested Prescriptions     Pending Prescriptions Disp Refills    zolpidem CR (AMBIEN CR) 12.5 mg tablet 90 Tablet 0     Sig: Take 1 Tablet by mouth nightly.        Prior labs and Blood pressures:  BP Readings from Last 3 Encounters:   05/31/22 (!) 172/119   05/16/22 (!) 119/90   03/23/22 118/68     Lab Results   Component Value Date/Time    Sodium 141 09/08/2021 10:20 AM    Potassium 5.0 09/08/2021 10:20 AM    Chloride 111 (H) 09/08/2021 10:20 AM    CO2 28 09/08/2021 10:20 AM    Anion gap 2 (L) 09/08/2021 10:20 AM    Glucose 96 09/08/2021 10:20 AM    BUN 22 (H) 09/08/2021 10:20 AM    Creatinine 0.95 09/08/2021 10:20 AM    BUN/Creatinine ratio 23 (H) 09/08/2021 10:20 AM    GFR est AA >60 09/08/2021 10:20 AM    GFR est non-AA >60 09/08/2021 10:20 AM    Calcium 9.2 09/08/2021 10:20 AM     Lab Results   Component Value Date/Time    Hemoglobin A1c 5.8 09/22/2019 01:48 AM     Lab Results   Component Value Date/Time    Cholesterol, total 132 09/08/2021 10:20 AM    HDL Cholesterol 47 09/08/2021 10:20 AM    LDL, calculated 72.2 09/08/2021 10:20 AM    VLDL, calculated 12.8 09/08/2021 10:20 AM    Triglyceride 64 09/08/2021 10:20 AM    CHOL/HDL Ratio 2.8 09/08/2021 10:20 AM     Lab Results   Component Value Date/Time    Vitamin D 25-Hydroxy 39.5 02/11/2021 11:55 AM       Lab Results   Component Value Date/Time    TSH 0.93 09/08/2021 10:20 AM    TSH, 3rd generation 0.85 08/27/2020 10:21 AM

## 2022-10-07 ENCOUNTER — LAB ONLY (OUTPATIENT)
Dept: INTERNAL MEDICINE CLINIC | Age: 62
End: 2022-10-07

## 2022-10-07 DIAGNOSIS — Z00.00 ROUTINE ADULT HEALTH MAINTENANCE: Primary | ICD-10-CM

## 2022-10-07 LAB
ALBUMIN SERPL-MCNC: 4 G/DL (ref 3.5–5)
ALBUMIN/GLOB SERPL: 1.3 {RATIO} (ref 1.1–2.2)
ALP SERPL-CCNC: 115 U/L (ref 45–117)
ALT SERPL-CCNC: 37 U/L (ref 12–78)
ANION GAP SERPL CALC-SCNC: 5 MMOL/L (ref 5–15)
AST SERPL-CCNC: 26 U/L (ref 15–37)
BASOPHILS # BLD: 0.1 K/UL (ref 0–0.1)
BASOPHILS NFR BLD: 1 % (ref 0–1)
BILIRUB SERPL-MCNC: 0.3 MG/DL (ref 0.2–1)
BUN SERPL-MCNC: 19 MG/DL (ref 6–20)
BUN/CREAT SERPL: 19 (ref 12–20)
CALCIUM SERPL-MCNC: 9.4 MG/DL (ref 8.5–10.1)
CHLORIDE SERPL-SCNC: 108 MMOL/L (ref 97–108)
CHOLEST SERPL-MCNC: 142 MG/DL
CO2 SERPL-SCNC: 28 MMOL/L (ref 21–32)
CREAT SERPL-MCNC: 1.01 MG/DL (ref 0.7–1.3)
CREAT UR-MCNC: 181 MG/DL
DIFFERENTIAL METHOD BLD: NORMAL
EOSINOPHIL # BLD: 0.3 K/UL (ref 0–0.4)
EOSINOPHIL NFR BLD: 5 % (ref 0–7)
ERYTHROCYTE [DISTWIDTH] IN BLOOD BY AUTOMATED COUNT: 13.8 % (ref 11.5–14.5)
EST. AVERAGE GLUCOSE BLD GHB EST-MCNC: 126 MG/DL
GLOBULIN SER CALC-MCNC: 3.2 G/DL (ref 2–4)
GLUCOSE SERPL-MCNC: 101 MG/DL (ref 65–100)
HBA1C MFR BLD: 6 % (ref 4–5.6)
HCT VFR BLD AUTO: 47.8 % (ref 36.6–50.3)
HDLC SERPL-MCNC: 45 MG/DL
HDLC SERPL: 3.2 {RATIO} (ref 0–5)
HGB BLD-MCNC: 15.8 G/DL (ref 12.1–17)
IMM GRANULOCYTES # BLD AUTO: 0 K/UL (ref 0–0.04)
IMM GRANULOCYTES NFR BLD AUTO: 0 % (ref 0–0.5)
LDLC SERPL CALC-MCNC: 76 MG/DL (ref 0–100)
LYMPHOCYTES # BLD: 1.6 K/UL (ref 0.8–3.5)
LYMPHOCYTES NFR BLD: 24 % (ref 12–49)
MCH RBC QN AUTO: 31.2 PG (ref 26–34)
MCHC RBC AUTO-ENTMCNC: 33.1 G/DL (ref 30–36.5)
MCV RBC AUTO: 94.5 FL (ref 80–99)
MICROALBUMIN UR-MCNC: 0.94 MG/DL
MICROALBUMIN/CREAT UR-RTO: 5 MG/G (ref 0–30)
MONOCYTES # BLD: 0.7 K/UL (ref 0–1)
MONOCYTES NFR BLD: 10 % (ref 5–13)
NEUTS SEG # BLD: 4.1 K/UL (ref 1.8–8)
NEUTS SEG NFR BLD: 60 % (ref 32–75)
NRBC # BLD: 0 K/UL (ref 0–0.01)
NRBC BLD-RTO: 0 PER 100 WBC
PLATELET # BLD AUTO: 287 K/UL (ref 150–400)
PMV BLD AUTO: 10.9 FL (ref 8.9–12.9)
POTASSIUM SERPL-SCNC: 4.1 MMOL/L (ref 3.5–5.1)
PROT SERPL-MCNC: 7.2 G/DL (ref 6.4–8.2)
RBC # BLD AUTO: 5.06 M/UL (ref 4.1–5.7)
SODIUM SERPL-SCNC: 141 MMOL/L (ref 136–145)
TRIGL SERPL-MCNC: 105 MG/DL (ref ?–150)
VLDLC SERPL CALC-MCNC: 21 MG/DL
WBC # BLD AUTO: 6.7 K/UL (ref 4.1–11.1)

## 2022-10-07 NOTE — PROGRESS NOTES
Please call the patient regarding his diagnostic evaluation. Laboratory studies generally normal except for finding of prediabetes. He should try to modify his diet accordingly. He should discontinue tobacco products.

## 2022-10-07 NOTE — PROGRESS NOTES
ICD-10-CM ICD-9-CM    1. Personal history of tobacco use, presenting hazards to health  Z87.891 V15.82 CT LOW DOSE LUNG CANCER SCREENING      2. Routine adult health maintenance  Z00.00 V70.0       3. Encounter for smoking cessation counseling  Z71.6 V65.42      305.1       4. TIA (transient ischemic attack)  G45.9 435.9       5. Gastroesophageal reflux disease without esophagitis  K21.9 530.81       6. Reactive [facility] of multiple sites ([facility]  M02.39 099.3      711.19       7. Bilateral carotid artery stenosis  I65.23 433.10 DUPLEX CAROTID BILATERAL     433.30       8. Essential hypertension  I10 401.9       9. Hypercholesterolemia  E78.00 272.0       10. Prediabetes  R73.03 790.29       11. Current smoker  F17.200 305.1                Subjective:     Chief Complaint   Patient presents with    Physical       Lampas[place] Brothers. is a 58 y.o. M. He has a past medical history of bilateral carotid artery stenosis, Crohn's disease, fibromyalgia, and a prior history of transient ischemic attack. I reviewed and updated the medical record. This patient was seen by his previous primary care provider most recently in early March for follow-up of multiple medical problems. He was noted to be using lisinopril for his history of hypertension, and was tolerating pravastatin for his hypercholesterolemia without side effects. He had complained at the time of a chronic cough, which had started after his first bout of COVID earlier in the year. He was noted to be smoking actively. Physical examination at the time had been generally unremarkable, with a normal lung examination. Inguinal hernia had been noted on the right side. He was referred to general surgery for his right sided inguinal hernia and was advised to discontinue smoking. Over the past several months, the patient was seen mainly by general surgery for treatment of his right inguinal hernia, for which she had undergone surgical correction in May.     Today, the patient was in for routine preventive care, follow-up on his chronic medical concerns, and a complaint of coughing. The patient says that over the past several months, he has been in general in his usual state of health, although he has been exercising less after his recent surgery for his hernia repair. Over the past several months, he has noted the development of a dry, nonproductive cough, which she says has really been worsened after having come down with COVID-19 in December 2021. He describes a daily cough, which is typically not associated with any wheezing, chest pain, shortness of breath, or any further cardiopulmonary concerns. He denies having had any hemoptysis, night sweats, fevers, chills, or any significant sputum production. He says that in general, the cough appears to be associated with some postnasal drip, and he continues to have nasal congestion with rhinorrhea which has been present throughout much of this time. He does note that on occasion he has resorted to using Flonase, which he says helps both the nasal congestion, rhinorrhea, and coughing. He denies having had any worsening heartburn during this time, and he continues on Dexilant with good control of his symptoms overall. He denies having had any chest pain, or changes in exercise tolerance. No orthopnea, paroxysmal nocturnal dyspnea, or lower extremity edema. He wonders if his coughing could be related to his use of one of his blood pressure medications, and notes that he has been on lisinopril now for about as long as he has had the coughing. However, he does not wish to change medications if not necessary. He notes that he has never been diagnosed with COPD or asthma, but notes a prior history of pulmonary sarcoidosis, for which she had previously undergone many years ago evaluation with pulmonary function testing. Other than this, he has been doing generally well.   His blood pressure readings are at home similar to where they are in clinic today. He is precontemplative regarding smoking cessation, although he acknowledges the need to quit, and cites as his experience one of his friends having coming down with laryngeal cancer few months ago. He continues on pravastatin 40 mg daily, and reports tolerating this well without any myalgias or GI symptoms. His last LDL as recently checked and reviewed below was excellent and at target less than 100 mg/dL. I reviewed his other laboratory studies with the patient today. He is noted to have some developing prediabetes with his last A1c 1 week ago being elevated at 6.0%. He denies having had any polyuria or polydipsia. His weight overall has been relatively stable. The patient is also noted to have a history of reactive arthritis in the setting of previous history of suspected inflammatory bowel disease/Crohn's disease. He had previously been followed by a rheumatologist for this. He is not currently using any disease modifying therapy, and denies having had any recent worsening joint pain, skin problems, or any bowel symptoms. His last colonoscopy was performed in 2020 with a 3-year follow-up having been recommended at the time. I reviewed the patient's preventive care services. He is up-to-date with regard to screening colonoscopy. He is a lifelong smoker, and has a over 30-pack-year history. He continues to smoke about 1 pack of cigarettes daily or more. He is due for low-dose CT screening of his chest.  He has a prior history of TIA in 2019, during which time he was found to have mild bilateral carotid artery stenosis; he has not had a repeat carotid ultrasound in some time. He denies having had any focal neurological symptoms including any numbness, tingling, or weakness. His review of systems is otherwise negative.   He is precontemplative regarding all vaccinations today we discussed the rationale behind vaccinations, as well as his indications for this including his history of smoking. He remains precontemplative. Routine Healthcare Maintenance issues are reviewed and discussed with the patient as noted below. Orders to update gaps in healthcare maintenance were placed as noted below in the Assessment and Plan, where applicable. Past Medical History:  Past Medical History:   Diagnosis Date    At risk for sleep apnea 05/05/2022    stop bang 4 - faxed to PCP office    Bilateral carotid artery stenosis 02/2021    mild, <15%    Chronic insomnia 08/16/2018    Crohn's disease ([facility] 08/14/2018    Current smoker     Depression 08/14/2018    Diverticulosis 08/14/2018    Enlarged prostate 11/2020    CT Finding    Fibromyalgia 08/14/2018    Gastroesophageal reflux disease without esophagitis 08/16/2018    Hepatic steatosis 11/2020    CT Finding    History of colon polyps 08/14/2018    History of echocardiogram 09/2019    Left Ventricle: Normal cavity size and systolic function (ejection fraction normal). Mild concentric hypertrophy. Estimated left ventricular ejection fraction is 61 - 65%. Age-appropriate left ventricular diastolic function. History of inguinal hernia repair     History of transient ischemic attack 2019    Hypercholesterolemia     IBS (irritable bowel syndrome) 08/14/2018    Onychomycosis 08/14/2018    Personal history of COVID-19 12/2021    Prediabetes     Primary hypertension     Reactive arthritis ([facility] 08/14/2018    Sarcoidosis 08/14/2018       Past Surgical Histor:  Past Surgical History:   Procedure Laterality Date    HX COLONOSCOPY      HX ENDOSCOPY      HX ORCHIECTOMY Left     HX ORTHOPAEDIC      L3-5 lumbar surgery    HX TONSILLECTOMY      HX WISDOM TEETH EXTRACTION         Allergies:   Allergies   Allergen Reactions    Remicade [Infliximab] Other (comments)     Unable to walk         Medications:  Current Outpatient Medications   Medication Sig Dispense Refill    zolpidem CR (AMBIEN CR) 12.5 mg tablet TAKE 1 TABLET BY MOUTH EVERYDAY AT BEDTIME 90 Tablet 0    lisinopriL (PRINIVIL, ZESTRIL) 20 mg tablet Take 1 Tablet by mouth in the morning for 90 days. 90 Tablet 1    pravastatin (PRAVACHOL) 40 mg tablet Take 1 Tablet by mouth nightly. 90 Tablet 3    DULoxetine (CYMBALTA) 60 mg capsule Take 1 Capsule by mouth in the morning. 90 Capsule 3    aspirin 81 mg chewable tablet Take 1 Tablet by mouth daily. 90 Tablet 0    traZODone (DESYREL) 50 mg tablet Take 1 Tablet by mouth nightly. 30 Tablet 0    dexlansoprazole (DEXILANT) 60 mg CpDB capsule (delayed release) Take 60 mg by mouth nightly.          Social History:  Social History     Socioeconomic History    Marital status:     Number of children: 2   Occupational History    Occupation: refinishes furniture   Tobacco Use    Smoking status: Every Day     Packs/day: 1.50     Years: 20.00     Pack years: 30.00     Types: Cigarettes    Smokeless tobacco: Never   Vaping Use    Vaping Use: Never used   Substance and Sexual Activity    Alcohol use: No    Drug use: Never       Family History:  Family History   Problem Relation Age of Onset    Hypertension Mother     Hypertension Father     Prostate Cancer Father     Stroke Father     Hypertension Brother     Hypertension Paternal Grandfather     Colon Cancer Paternal Grandfather     Colon Cancer Paternal Uncle     No Known Problems Child        Immunizations:  Immunization History   Administered Date(s) Administered    Influenza, FLUARIX, FLULAVAL, FLUZONE (age 10 mo+) AND AFLURIA, (age 1 y+), PF, 0.5mL 09/23/2019    Tdap 08/16/2018        Healthcare Maintenance:  Health Maintenance   Topic Date Due    COVID-19 Vaccine (1) Never done    Pneumococcal 0-64 years (1 - PCV) Never done    Shingrix Vaccine Age 50> (1 of 2) Never done    Low dose CT lung screening  Never done    Flu Vaccine (1) 08/01/2022    Depression Monitoring  05/31/2023    A1C test (Diabetic or Prediabetic)  10/07/2023    Lipid Screen  10/07/2023    Colorectal Cancer Screening Combo 12/10/2023    DTaP/Tdap/Td series (2 - Td or Tdap) 08/16/2028    Hepatitis C Screening  Completed        Review of Systems:  ROS:  Review of Systems   Constitutional: Negative. HENT:  Positive for congestion. Eyes: Negative. Respiratory:  Positive for cough. Negative for hemoptysis, sputum production, shortness of breath and wheezing. Cardiovascular: Negative. Negative for chest pain, palpitations, orthopnea, claudication, leg swelling and PND. Gastrointestinal: Negative. Genitourinary: Negative. Musculoskeletal: Negative. Skin: Negative. Neurological: Negative. Endo/Heme/Allergies: Negative. Psychiatric/Behavioral: Negative. ROS otherwise negative      Objective:     Vital Signs:  Visit Vitals  /70 (BP 1 Location: Left upper arm, BP Patient Position: Sitting, BP Cuff Size: Adult)   Pulse 85   Resp 16   Ht 6' 1\" (1.854 m)   Wt 210 lb 12.8 oz (95.6 kg)   SpO2 96%   BMI 27.81 kg/m²       BMI:  Body mass index is 27.81 kg/m². Physical Examination:  Physical Exam  Vitals reviewed. Constitutional:       Appearance: Normal appearance. HENT:      Head: Normocephalic and atraumatic. Nose: Congestion and rhinorrhea present. Mouth/Throat:      Mouth: Mucous membranes are moist.   Eyes:      Extraocular Movements: Extraocular movements intact. Conjunctiva/sclera: Conjunctivae normal.      Pupils: Pupils are equal, round, and reactive to light. Cardiovascular:      Rate and Rhythm: Normal rate and regular rhythm. Pulses: Normal pulses. Heart sounds: Normal heart sounds. No murmur heard. No friction rub. No gallop. Pulmonary:      Effort: Pulmonary effort is normal. No respiratory distress. Breath sounds: No wheezing, rhonchi or rales. Comments: Mildly diminished throughout  Abdominal:      General: Bowel sounds are normal. There is no distension. Palpations: Abdomen is soft. There is no mass.       Tenderness: no abdominal tenderness There is no guarding or rebound. Musculoskeletal:         General: No tenderness, deformity or signs of injury. Normal range of motion. Cervical back: Normal range of motion and neck supple. Right lower leg: No edema. Left lower leg: No edema. Skin:     General: Skin is warm and dry. Findings: No bruising, lesion or rash. Neurological:      General: No focal deficit present. Mental Status: He is alert and oriented to person, place, and time. Mental status is at baseline. Cranial Nerves: No cranial nerve deficit. Sensory: No sensory deficit. Motor: No weakness. Coordination: Coordination normal.      Gait: Gait normal.      Deep Tendon Reflexes: Reflexes normal.   Psychiatric:         Mood and Affect: Mood normal.         Behavior: Behavior normal.        Physical exam otherwise negative    Diagnostic Testing:    Laboratory Studies:  Lab Only on 10/07/2022   Component Date Value Ref Range Status    Microalbumin,urine random 10/07/2022 0.94  MG/DL Final    No reference range has been established. Creatinine, urine random 10/07/2022 181.00  mg/dL Final    No reference range has been established. Microalbumin/Creat ratio (mg/g cre* 10/07/2022 5  0 - 30 mg/g Final    Cholesterol, total 10/07/2022 142  <200 MG/DL Final    Triglyceride 10/07/2022 105  <150 MG/DL Final    Based on NCEP-ATP III:  Triglycerides <150 mg/dL  is considered normal, 150-199 mg/dL  borderline high,  200-499 mg/dL high and  greater than or equal to 500 mg/dL very high. HDL Cholesterol 10/07/2022 45  MG/DL Final    Based on NCEP ATP III, HDL Cholesterol <40 mg/dL is considered low and >60 mg/dL is elevated.     LDL, calculated 10/07/2022 76  0 - 100 MG/DL Final    Comment: Based on the NCEP-ATP: LDL-C concentrations are considered  optimal <100 mg/dL,  near optimal/above Normal 100-129 mg/dL  Borderline High: 130-159, High: 160-189 mg/dL  Very High: Greater than or equal to 190 mg/dL      VLDL, calculated 10/07/2022 21  MG/DL Final    CHOL/HDL Ratio 10/07/2022 3.2  0.0 - 5.0   Final    Sodium 10/07/2022 141  136 - 145 mmol/L Final    Potassium 10/07/2022 4.1  3.5 - 5.1 mmol/L Final    Chloride 10/07/2022 108  97 - 108 mmol/L Final    CO2 10/07/2022 28  21 - 32 mmol/L Final    Anion gap 10/07/2022 5  5 - 15 mmol/L Final    Glucose 10/07/2022 101 (A)  65 - 100 mg/dL Final    BUN 10/07/2022 19  6 - 20 MG/DL Final    Creatinine 10/07/2022 1.01  0.70 - 1.30 MG/DL Final    BUN/Creatinine ratio 10/07/2022 19  12 - 20   Final    eGFR 10/07/2022 >60  >60 ml/min/1.73m2 Final    Comment:   Pediatric calculator link: Juan.[email or web address] org/professionals/kdoqi/gfr_calculatorped    Effective Oct 3, 2022    These results are not intended for use in patients <25years of age. eGFR results are calculated without a race factor using  the 2021 CKD-EPI equation. Careful clinical correlation is recommended, particularly when comparing to results calculated using previous equations. The CKD-EPI equation is less accurate in patients with extremes of muscle mass, extra-renal metabolism of creatinine, excessive creatine ingestion, or following therapy that affects renal tubular secretion. Calcium 10/07/2022 9.4  8.5 - 10.1 MG/DL Final    Bilirubin, total 10/07/2022 0.3  0.2 - 1.0 MG/DL Final    ALT (SGPT) 10/07/2022 37  12 - 78 U/L Final    AST (SGOT) 10/07/2022 26  15 - 37 U/L Final    Alk.  phosphatase 10/07/2022 115  45 - 117 U/L Final    Protein, total 10/07/2022 7.2  6.4 - 8.2 g/dL Final    Albumin 10/07/2022 4.0  3.5 - 5.0 g/dL Final    Globulin 10/07/2022 3.2  2.0 - 4.0 g/dL Final    A-G Ratio 10/07/2022 1.3  1.1 - 2.2   Final    WBC 10/07/2022 6.7  4.1 - 11.1 K/uL Final    RBC 10/07/2022 5.06  4.10 - 5.70 M/uL Final    HGB 10/07/2022 15.8  12.1 - 17.0 g/dL Final    HCT 10/07/2022 47.8  36.6 - 50.3 % Final    MCV 10/07/2022 94.5  80.0 - 99.0 FL Final    MCH 10/07/2022 31.2  26.0 - 34.0 PG Final MCHC 10/07/2022 33.1  30.0 - 36.5 g/dL Final    RDW 10/07/2022 13.8  11.5 - 14.5 % Final    PLATELET 64/43/6954 911  150 - 400 K/uL Final    MPV 10/07/2022 10.9  8.9 - 12.9 FL Final    NRBC 10/07/2022 0.0  0  WBC Final    ABSOLUTE NRBC 10/07/2022 0.00  0.00 - 0.01 K/uL Final    NEUTROPHILS 10/07/2022 60  32 - 75 % Final    LYMPHOCYTES 10/07/2022 24  12 - 49 % Final    MONOCYTES 10/07/2022 10  5 - 13 % Final    EOSINOPHILS 10/07/2022 5  0 - 7 % Final    BASOPHILS 10/07/2022 1  0 - 1 % Final    IMMATURE GRANULOCYTES 10/07/2022 0  0.0 - 0.5 % Final    ABS. NEUTROPHILS 10/07/2022 4.1  1.8 - 8.0 K/UL Final    ABS. LYMPHOCYTES 10/07/2022 1.6  0.8 - 3.5 K/UL Final    ABS. MONOCYTES 10/07/2022 0.7  0.0 - 1.0 K/UL Final    ABS. EOSINOPHILS 10/07/2022 0.3  0.0 - 0.4 K/UL Final    ABS. BASOPHILS 10/07/2022 0.1  0.0 - 0.1 K/UL Final    ABS. IMM. GRANS. 10/07/2022 0.0  0.00 - 0.04 K/UL Final    DF 10/07/2022 AUTOMATED    Final    Hemoglobin A1c 10/07/2022 6.0 (A)  4.0 - 5.6 % Final    Comment: NEW METHOD  PLEASE NOTE NEW REFERENCE RANGE  (NOTE)  HbA1C Interpretive Ranges  <5.7              Normal  5.7 - 6.4         Consider Prediabetes  >6.5              Consider Diabetes      Est. average glucose 10/07/2022 126  mg/dL Final         Radiographic Studies:  XR Results (most recent):  Results from Hospital Encounter encounter on 03/09/22    XR CHEST PA LAT    Narrative  Exam:  2 view chest    Indication: Cough    Comparison to 9/21/2019. PA and lateral views demonstrate normal heart size. There is no acute process in  the lung fields. Degenerative changes are seen in the thoracic spine. Impression  Impression: No acute process or change compared to the prior exam.     TAM Results (most recent):  No results found for this or any previous visit.      CT Results (most recent):  Results from Hospital Encounter encounter on 11/11/20    CT ABD PELV W CONT    Narrative  EXAM: CT ABD PELV W CONT    INDICATION: Diarrhea    COMPARISON: None    CONTRAST: 100 mL of Isovue-370. TECHNIQUE:  Following the uneventful intravenous administration of contrast, thin axial  images were obtained through the abdomen and pelvis. Coronal and sagittal  reconstructions were generated. Oral contrast was administered. CT dose  reduction was achieved through use of a standardized protocol tailored for this  examination and automatic exposure control for dose modulation. FINDINGS:  LOWER THORAX: 4.5 mm right middle lobe pulmonary nodule. Paraseptal and  centrilobular emphysema. Lingular scarring. LIVER: Hepatic steatosis. BILIARY TREE: Gallbladder is within normal limits. CBD is not dilated. SPLEEN: within normal limits. PANCREAS: No mass or ductal dilatation. ADRENALS: Unremarkable. KIDNEYS: No mass, calculus, or hydronephrosis. STOMACH: Unremarkable. SMALL BOWEL: No dilatation or wall thickening. COLON: No dilatation or wall thickening. APPENDIX: Normal.  PERITONEUM: No ascites or pneumoperitoneum. RETROPERITONEUM: No lymphadenopathy or aortic aneurysm. REPRODUCTIVE ORGANS: Enlarged prostate. URINARY BLADDER: No mass or calculus. BONES: Fusion L5-S1. Degenerative changes of the spine. ABDOMINAL WALL: No mass or hernia. ADDITIONAL COMMENTS: N/A    Impression  IMPRESSION:  No significant findings. Enlarged prostate. DEXA Results (most recent):  No results found for this or any previous visit. MRI Results (most recent):  Results from [place] encounter on 09/21/19    MRI BRAIN WO CONT    Narrative  EXAM:  MRI BRAIN WO CONT  INDICATION:  Workup for TIA, patient presented to the ED with acute onset  multiple episodes left-sided numbness on the day of arrival lasting short  periods of time. Upon arrival symptoms had resolved. TECHNIQUE: Sagittal T1, axial FLAIR, T2,T1 and gradient echo images as well as  coronal T2 weighted images and axial diffusion weighted images of the head were  obtained.   COMPARISON: CT, CTA. FINDINGS:  The ventricular size and configuration are normal.  Scattered tiny punctate foci of T2 hyperintensity in the cerebral white matter  without associated restricted diffusion. Nonspecific pattern and possibly  chronic. Otherwise normal signal in the brain. No evidence of hemorrhage, mass, infarct or abnormal extra-axial fluid  collections. Flow voids are present in the vertebral basilar and carotid artery systems. The craniocervical junction is unremarkable. Mild mucosal thickening paranasal sinuses. Impression  IMPRESSION: No acute intracranial abnormality demonstrated. Assessment/Plan:       ICD-10-CM ICD-9-CM    1. Personal history of tobacco use, presenting hazards to health  Z87.891 V15.82 CT LOW DOSE LUNG CANCER SCREENING      2. Routine adult health maintenance  Z00.00 V70.0       3. Encounter for smoking cessation counseling  Z71.6 V65.42      305.1       4. TIA (transient ischemic attack)  G45.9 435.9       5. Gastroesophageal reflux disease without esophagitis  K21.9 530.81       6. Re[facility] ([facility]  M02.39 099.3      711.19       7. Bilateral carotid artery stenosis  I65.23 433.10 DUPLEX CAROTID BILATERAL     433.30       8. Essential hypertension  I10 401.9       9. Hypercholesterolemia  E78.00 272.0       10. Prediabetes  R73.03 790.29       11. Current smoker  F17.200 305.1              Chronic cough:  - Etiology unclear but suspect most likely related to postnasal drip given findings on physical examination today as well as history. Trial of conservative management is advised; patient is educated today on using nasal saline rinses and Flonase. Differential also includes reactive airway disease or asthma/COPD, which also seems highly likely given the patient's history of smoking. However, there is no significant wheezing on examination today and his exercise tolerance has been stable. I also note the prior history of sarcoidosis.   There should be a very low threshold to obtain pulmonary function testing should his symptoms not resolve with conservative management. Heartburn would also be a consideration of though he is already on Dexilant and denies having had any recent change in his heartburn symptoms. Finally, need to consider possibility of ACE inhibitor induced cough, given his ongoing use of lisinopril. I offered to change this medication today to losartan, but the patient wishes to continue with current therapy for now pending a trial of more aggressive, conservative management as noted for treatment of postnasal drip. I am doubtful of any other intraparenchymal process including pneumonia or lung cancer. He is due for low-dose CT screening for lung cancer as noted below, and I also am doubtful for any cardiac source of his symptoms to include heart failure given his euvolemic status on examination today. Return precautions are discussed with the patient who endorses agreement and understanding. Reactive arthritis:  - Currently asymptomatic. Previously followed by rheumatology. Also note history of sarcoidosis. Not currently on any kind of DMARD therapy. Deferring additional therapy for the time being. Consider referral back to rheumatology should symptoms recur. GERD/PUD:   - History of Boyd's Esophagus: No   - Other complications? none   - Current meds:  Key Chronic GI Meds               dexlansoprazole (DEXILANT) 60 mg CpDB capsule (delayed release) (Taking) Take 60 mg by mouth nightly. - Current Symptoms/Red Flags: None. - Lifestyle changes advised: dietary changes, tobacco cessation   - Plan: Rx for PPI is written        Healthcare Maintenance:  - Preventive measures are reviewed as per above  - Up to date on routine interventions except as noted above  - Orders placed to update gaps as noted  - Notes: Up-to-date with regard to routine screening examinations. Colonoscopy up-to-date.   Low-dose CT screening is ordered for lung cancer today given his extensive smoking history. Checking carotid ultrasound as noted below. Carotid Artery Stenosis:   - Location: bilateral    - Last Imagin/21   - Plaque Percent: <25%   - Previous interventions: has not had previous carotid surgery. - Plaque Severity: Minimal   - Life Expectancy > 5 years? YES   - Yearly ultrasound ordered? YES   - Statin treatment: YES   - Antithrombotic Therapy: YES   - Blood pressure management: YES   - Smoking Cessation Counseling Done: YES   - Other therapeutic Lifestyle Changes counseled: YES        Essential Hypertension/Blood Pressure Management:   - Home BP Readings: usual 130/80   - Current Control: optimal   - Target BP: Less than 130/80 mmHg   - Relevant BP Meds:  Key CAD CHF Meds               lisinopriL (PRINIVIL, ZESTRIL) 20 mg tablet (Taking) Take 1 Tablet by mouth in the morning for 90 days. pravastatin (PRAVACHOL) 40 mg tablet (Taking) Take 1 Tablet by mouth nightly. aspirin 81 mg chewable tablet (Taking) Take 1 Tablet by mouth daily.               - Plan: continue current treatment regimen, continue current meds, dietary sodium restriction, regular aerobic exercise, weight loss, stop smoking   - Notes: Labs stable, continuing current medication regimen, as noted above, consider discontinuing lisinopril in favor losartan given chronic cough, patient wishes to hold off on this for now. Hyperlipidemia/Dyslipidemia:   - Summary of Cardiovascular Risks and Goals:     LDL goal is under 80  hypertension  hyperlipidemia  smoker  prior CVA/TIA or known carotid artery disease  [place] 10-year risk >20%   -   Lab Results   Component Value Date/Time    LDL, calculated 76 10/07/2022 09:42 AM    HDL Cholesterol 45 10/07/2022 09:42 AM       - Relevant Cholesterol Meds:  Key Antihyperlipidemia Meds               pravastatin (PRAVACHOL) 40 mg tablet (Taking) Take 1 Tablet by mouth nightly.               - Cholesterol at target: yes   - Does patient meet USPSTF and ACC/AHA indications for pharmacotherapy (e.g., statin): yes   - GI symptoms with meds: NO   - Muscle aches with meds: NO   - Other Adverse effects with meds: NO   - Medication Plan: continue   - Notes: ---    Tobacco Use:   - Smoking History: positive for approximately 1-1.5 packs per day. Patient advised to quit smoking   - Discussion with Patient today: I advised patient to quit, and offered support. Educational material distributed. Discussed current use pattern. Asked pt to inform me when sets quit date. . Time of Discussion: 5 minutes. - Notes: ---    Discussed with the patient the current USPSTF guidelines released March 9, 2021 for screening for lung cancer. For adults aged 48 to [de-identified] years who have a 20 pack-year smoking history and currently smoke or have quit within the past 15 years the grade B recommendation is to:  Screen for lung cancer with low-dose computed tomography (LDCT) every year. Stop screening once a person has not smoked for 15 years or has a health problem that limits life expectancy or the ability to have lung surgery. The patient has a 30+ pack-year history of cigarette smoking and currently is smoking 1ppd. Discussed with patient the risks and benefits of screening, including over-diagnosis, false positive rate, and total radiation exposure. The patient currently exhibits no signs or symptoms suggestive of lung cancer. Discussed with patient the importance of compliance with yearly annual lung cancer screenings and willingness to undergo diagnosis and treatment if screening scan is positive. In addition, the patient was counseled regarding the importance of remaining smoke free and/or total smoking cessation.     Also reviewed the following if the patient has Medicare that as of February 10, 2022, Medicare only covers LDCT screening in patients aged 51-72 with at least a 20 pack-year smoking history who currently smoke or have quit in the last 15 years    ASCVD:   - Type: history of TIA, ROE   - Current Symptoms: No Symptoms, no angina   - History and Contributing Factors: elevated cholesterol, hypertension, known history of coronary artery disease, and smoking  Key CAD CHF Meds               lisinopriL (PRINIVIL, ZESTRIL) 20 mg tablet (Taking) Take 1 Tablet by mouth in the morning for 90 days. pravastatin (PRAVACHOL) 40 mg tablet (Taking) Take 1 Tablet by mouth nightly. aspirin 81 mg chewable tablet (Taking) Take 1 Tablet by mouth daily. - Target BP: < 130/80 mmHg; see Blood Pressure section   - Statin? YES   - Antiplatelet and/or Anticoagulant? YES   - ACEI/ARB? YES   - Beta Blocker? NO   - Notes: ---        Follow-up and Dispositions    Return in about 6 months (around 4/14/2023). I have reviewed the patient's medical history in detail and updated the computerized patient record. We had a prolonged discussion about these complex clinical issues and went over the various important aspects to consider. All questions were answered. Advised the patient to call back or return to office if symptoms do not improve, change in nature, or persist.     The patient was given an after visit summary or informed of MyChart Access which includes patient instructions, diagnoses, current medications, & vitals. he expressed understanding with the diagnosis and plan. Katheen Halsted, MD    Please note that this dictation was completed with [employer], the computer voice recognition software. Quite often unanticipated grammatical, syntax, homophones, and other interpretive errors are inadvertently transcribed by the computer software. Please disregard these errors. Please excuse any errors that have escaped final proofreading.      This was a complex patient and total appointment time was at least 40 minutes, to include:  - review of medical record  - history gathering, physical examination, and review of systems  - medication reconciliation  - medical decision-making  - counseling on the plan of care

## 2022-10-14 ENCOUNTER — OFFICE VISIT (OUTPATIENT)
Dept: INTERNAL MEDICINE CLINIC | Age: 62
End: 2022-10-14
Payer: COMMERCIAL

## 2022-10-14 VITALS
HEIGHT: 73 IN | RESPIRATION RATE: 16 BRPM | OXYGEN SATURATION: 96 % | DIASTOLIC BLOOD PRESSURE: 70 MMHG | WEIGHT: 210.8 LBS | SYSTOLIC BLOOD PRESSURE: 130 MMHG | BODY MASS INDEX: 27.94 KG/M2 | HEART RATE: 85 BPM

## 2022-10-14 DIAGNOSIS — K21.9 GASTROESOPHAGEAL REFLUX DISEASE WITHOUT ESOPHAGITIS: ICD-10-CM

## 2022-10-14 DIAGNOSIS — I65.23 BILATERAL CAROTID ARTERY STENOSIS: ICD-10-CM

## 2022-10-14 DIAGNOSIS — Z00.00 ROUTINE ADULT HEALTH MAINTENANCE: ICD-10-CM

## 2022-10-14 DIAGNOSIS — F17.200 CURRENT SMOKER: ICD-10-CM

## 2022-10-14 DIAGNOSIS — E78.00 HYPERCHOLESTEROLEMIA: ICD-10-CM

## 2022-10-14 DIAGNOSIS — M02.39 REACTIVE ARTHRITIS OF MULTIPLE SITES (HCC): ICD-10-CM

## 2022-10-14 DIAGNOSIS — Z87.891 PERSONAL HISTORY OF TOBACCO USE, PRESENTING HAZARDS TO HEALTH: Primary | ICD-10-CM

## 2022-10-14 DIAGNOSIS — I10 ESSENTIAL HYPERTENSION: ICD-10-CM

## 2022-10-14 DIAGNOSIS — R73.03 PREDIABETES: ICD-10-CM

## 2022-10-14 DIAGNOSIS — Z71.6 ENCOUNTER FOR SMOKING CESSATION COUNSELING: ICD-10-CM

## 2022-10-14 DIAGNOSIS — G45.9 TIA (TRANSIENT ISCHEMIC ATTACK): ICD-10-CM

## 2022-10-14 PROCEDURE — G0296 VISIT TO DETERM LDCT ELIG: HCPCS | Performed by: INTERNAL MEDICINE

## 2022-10-14 PROCEDURE — 99396 PREV VISIT EST AGE 40-64: CPT | Performed by: INTERNAL MEDICINE

## 2022-10-14 PROCEDURE — 99406 BEHAV CHNG SMOKING 3-10 MIN: CPT | Performed by: INTERNAL MEDICINE

## 2022-10-14 PROCEDURE — 99215 OFFICE O/P EST HI 40 MIN: CPT | Performed by: INTERNAL MEDICINE

## 2022-10-14 NOTE — PATIENT INSTRUCTIONS
Saline Nasal Washes: Care Instructions  Overview     Saline nasal washes help keep the nasal passages open by washing out thick or dried mucus. This simple remedy can help relieve symptoms of allergies, sinusitis, and colds. It also can make the nose feel more comfortable by keeping the mucous membranes moist. You may notice a little burning sensation in your nose the first few times you use the solution, but this usually gets better in a few days. Follow-up care is a key part of your treatment and safety. Be sure to make and go to all appointments, and call your doctor if you are having problems. It's also a good idea to know your test results and keep a list of the medicines you take. How can you care for yourself at home? You can buy premixed saline solution in a squeeze bottle or other sinus rinse products at a drugstore. Read and follow the instructions on the label. You also can make your own saline solution by adding 1 teaspoon of non-iodized salt and 1 teaspoon of baking soda to 2 cups of distilled or boiled and cooled water. If you use a homemade solution, use a squeeze bottle or neti pot to get the solution into your nose. Room temperature or slightly warmed water may be more comfortable. Make sure it isn't hot. Stand over the sink with your head tilted forward and slightly to one side. Put only the tip of the syringe or squeeze bottle into the nostril that is farther away from the sink. (The nostril closest to the sink will drain the fluid.) Gently squirt the solution into the nostril and toward the back of your head with your mouth open. The solution should flow out the other nostril. Repeat on the other side. Some sneezing and gagging are normal at first.  Gently blow your nose. Clean the syringe or bottle after each use. Repeat this 2 or 3 times a day. Use nasal washes gently if you have nosebleeds often. When should you call for help?   Watch closely for changes in your health, and be sure to contact your doctor if:    Your symptoms do not get better. You have problems doing the nasal washes. Where can you learn more? Go to http://mane-evelio.info/  Enter B784 in the search box to learn more about \"Saline Nasal Washes: Care Instructions. \"  Current as of: September 8, 2021               Content Version: 13.2  © 2006-2022 [email or web address]. Care instructions adapted under license by [employer] (which disclaims liability or warranty for this information). If you have questions about a medical condition or this instruction, always ask your healthcare professional. [place] [address] any warranty or liability for your use of this information. Fluticasone (Into the nose)   Fluticasone (vrjx-FMU-w-sone)  Treats allergy symptoms, such as runny or stuffy nose. This medicine is a corticosteroid. Brand Name(s): Children's Flonase, ClariSpray, DermacinRx [employer], DermacinRx [place], [address] [place] [address], Flonase Sensimist, Fluticasone Propionate Novaplus, Ticaspray, Veramyst   There may be other brand names for this medicine. When This Medicine Should Not Be Used: This medicine is not right for everyone. Do not use if you had an allergic reaction to fluticasone. How to Use This Medicine:   Spray  Your doctor will tell you how much medicine to use. Do not use more than directed. This medicine is for use only in the nose. Do not get any of it in your eyes or on your skin. If it does get on these areas, rinse it off right away. Prime the spray: Release 6 test sprays into the air away from the face, or pump the bottle until some of the medicine sprays out. Now it is ready to use. Prime the spray if it has not been used for more than 7 days (or 30 days for Veramyst®) or if the cap has been left off the bottle for 5 days or longer. Shake the medicine well just before each use.   Before using the medicine, gently blow your nose to clear the nostrils. After using the nasal spray, wipe the tip of the bottle with a clean tissue and put the cap back on. You may need to use this medicine for a few days before you start to feel better. Read and follow the patient instructions that come with this medicine. Talk to your doctor or pharmacist if you have any questions. Follow the instructions on the medicine label if you are using this medicine without a prescription. Missed dose: Take a dose as soon as you remember. If it is almost time for your next dose, wait until then and take a regular dose. Do not take extra medicine to make up for a missed dose. Keep the bottle tightly closed when not using it. Store at room temperature, away from heat and direct light. Do not freeze or refrigerate. Throw this medicine away after you use 120 sprays. Drugs and Foods to Avoid:   Ask your doctor or pharmacist before using any other medicine, including over-the-counter medicines, vitamins, and herbal products. Do not use this medicine together with ritonavir. Some foods and medicines can affect how fluticasone works. Tell your doctor if you are using ketoconazole. Warnings While Using This Medicine:   Tell your doctor if you are pregnant or breastfeeding, or if you have liver disease, asthma, an infection, or a history of cataracts or glaucoma. Make sure your doctor knows if you have had nose surgery, a nose injury, or a recent infection in your nose. This medicine may cause the following problems:  Holes or ulcers inside the nose  Slow wound healing  Cataracts or glaucoma  Problems with the adrenal glands  Slow growth in children  Avoid people who are sick or have infections. Tell your doctor right away if you think you have been exposed to measles or chickenpox. Your doctor will check your progress and the effects of this medicine at regular visits. Keep all appointments. Call your doctor if your symptoms do not improve or if they get worse.   Keep all medicine out of the reach of children. Never share your medicine with anyone. Possible Side Effects While Using This Medicine:   Call your doctor right away if you notice any of these side effects: Allergic reaction: Itching or hives, swelling in your face or hands, swelling or tingling in your mouth or throat, chest tightness, trouble breathing  Burning, redness, swelling, or irritation around or inside your nose  Eye pain or vision changes  Fever, chills, cough, sore throat, and body aches  Heavy nosebleeds  Sores or white patches inside the nose or mouth  Tiredness, weakness, dizziness  If you notice other side effects that you think are caused by this medicine, tell your doctor. Call your doctor for medical advice about side effects. You may report side effects to FDA at 8-228-STP-2235  © 2017 [facility] Information is for End User's use only and may not be sold, redistributed or otherwise used for commercial purposes. The above information is an  only. It is not intended as medical advice for individual conditions or treatments. Talk to your doctor, nurse or pharmacist before following any medical regimen to see if it is safe and effective for you. Learning About Lung Cancer Screening  What is screening for lung cancer? Lung cancer screening is a way to find some lung cancers early, before a person has any symptoms of the cancer. Lung cancer screening may help those who have the highest risk for lung cancer--people age 48 and older who are or were heavy smokers. For most people, who aren't at increased risk, screening for lung cancer probably isn't helpful. Screening won't prevent cancer. And it may not find all lung cancers. Lung cancer screening may lower the risk of dying from lung cancer in a small number of people. How is it done? Lung cancer screening is done with a low-dose CT (computed tomography) scan.  A CT scan uses X-rays, or radiation, to make detailed pictures of your body. Experts recommend that screening be done in medical centers that focus on finding and treating lung cancer. Who is screening recommended for? Lung cancer screening is recommended for people age 48 and older who are or were heavy smokers. That means people with a smoking history of at least 20 pack years. A pack year is a way to measure how heavy a smoker you are or were. To figure out your pack years, multiply how many packs a day on average (assuming 20 cigarettes per pack) you have smoked by how many years you have smoked. For example: If you smoked 1 pack a day for 20 years, that's 1 times 20. So you have a smoking history of 20 pack years. If you smoked 2 packs a day for 10 years, that's 2 times 10. So you have a smoking history of 20 pack years. Experts agree that screening is for people who have a high risk of lung cancer. But experts don't agree on what high risk means. Some say people age 48 or older with at least a 20-pack-year smoking history are high risk. Others say it's people age 54 or older with a 30-pack-year history. To see if you could benefit from screening, first find out if you are at high risk for lung cancer. Your doctor can help you decide your lung cancer risk. What are the risks of screening? CT screening for lung cancer isn't perfect. It can show an abnormal result when it turns out there wasn't any cancer. This is called a false-positive result. This means you may need more tests to make sure you don't have cancer. These tests can be harmful and cause a lot of worry. These tests may include more CT scans and invasive testing like a lung biopsy. In a biopsy, the doctor takes a sample of tissue from inside your lung so it can be looked at under a microscope. A biopsy is the only way to tell if you have lung cancer. If the biopsy finds cancer, you and your doctor will have to decide how or whether to treat it.   Some lung cancers found on CT scans are harmless and would not have caused a problem if they had not been found through screening. But because doctors can't tell which ones will turn out to be harmless, most will be treated. This means that you may get treatment--including surgery, radiation, or chemotherapy--that you don't need. There is a risk of damage to cells or tissue from being exposed to radiation, including the small amounts used in CTs, X-rays, and other medical tests. Over time, exposure to radiation may cause cancer and other health problems. But in most cases, the risk of getting cancer from being exposed to small amounts of radiation is low. It's not a reason to avoid these tests for most people. What are the benefits of screening? Your scan may be normal (negative). For some people who are at higher risk, screening lowers the chance of dying of lung cancer. How much and how long you smoked helps to determine your risk level. Screening can find some cancers early, when treatment may be more likely to work. What happens after screening? The results of your CT scan will be sent to your doctor. Someone from your care team will explain the results of your scan and answer any questions you may have. If you need any follow-up, he or she will help you understand what to do next. After a lung cancer screening, you can go back to your usual activities right away. A lung cancer screening test can't tell if you have lung cancer. If your results are positive, your doctor can't tell whether an abnormal finding is a harmless nodule, cancer, or something else without doing more tests. What can you do to help prevent lung cancer? Some lung cancers can't be prevented. But if you smoke, quitting smoking is the best step you can take to prevent lung cancer. If you want to quit, your doctor can recommend medicines or other ways to help. Follow-up care is a key part of your treatment and safety.  Be sure to make and go to all appointments, and call your doctor if you are having problems. It's also a good idea to know your test results and keep a list of the medicines you take. Where can you learn more? Go to http://www.gray.com/  Enter Q940 in the search box to learn more about \"Learning About Lung Cancer Screening. \"  Current as of: September 8, 2021               Content Version: 13.2  © 3159-3772 [employer]. Care instructions adapted under license by [employer] (which disclaims liability or warranty for this information). If you have questions about a medical condition or this instruction, always ask your healthcare professional. [place] [address] any warranty or liability for your use of this information. Learning About Benefits From Quitting Smoking  How does quitting smoking make you healthier? If you're thinking about quitting smoking, you may have a few reasons to be smoke-free. Your health may be one of them. When you quit smoking, you lower your risks for cancer, lung disease, heart attack, stroke, blood vessel disease, and blindness from macular degeneration. When you're smoke-free, you get sick less often, and you heal faster. You are less likely to get colds, flu, bronchitis, and pneumonia. As a nonsmoker, you may find that your mood is better and you are less stressed. When and how will you feel healthier? Quitting has real health benefits that start from day 1 of being smoke-free. And the longer you stay smoke-free, the healthier you get and the better you feel. The first hours  After just 20 minutes, your blood pressure and heart rate go down. That means there's less stress on your heart and blood vessels. Within 12 hours, the level of carbon monoxide in your blood drops back to normal. That makes room for more oxygen. With more oxygen in your body, you may notice that you have more energy than when you smoked. After 2 weeks  Your lungs start to work better.   Your risk of heart attack starts to drop. After 1 month  When your lungs are clear, you cough less and breathe deeper, so it's easier to be active. Your sense of taste and smell return. That means you can enjoy food more than you have since you started smoking. Over the years  Over the years, your risks of heart disease, heart attack, and stroke are lower. After 10 years, your risk of dying from lung cancer is cut by about half. And your risk for many other types of cancer is lower too. How would quitting help others in your life? When you quit smoking, you improve the health of everyone who now breathes in your smoke. Their heart, lung, and cancer risks drop, much like yours. They are sick less. For babies and small children, living smoke-free means they're less likely to have ear infections, pneumonia, and bronchitis. If you're a woman who is or will be pregnant someday, quitting smoking means a healthier . Children who are close to you are less likely to become adult smokers. Where can you learn more? Go to http://www.gray.com/  Enter O319 in the search box to learn more about \"Learning About Benefits From Quitting Smoking. \"  Current as of: 2021               Content Version: 13.2   [employer]. Care instructions adapted under license by [employer] (which disclaims liability or warranty for this information). If you have questions about a medical condition or this instruction, always ask your healthcare professional. [place] [address] any warranty or liability for your use of this information.

## 2022-10-14 NOTE — PROGRESS NOTES
Chief Complaint   Patient presents with    Physical       1. \"Have you been to the ER, urgent care clinic since your last visit? Hospitalized since your last visit? \" No    2. \"Have you seen or consulted any other health care providers outside of the [address] [place] [address] since your last visit? \" No     3. For patients aged 39-70: Has the patient had a colonoscopy / FIT/ Cologuard? No      If the patient is female:    4. For patients aged 41-77: Has the patient had a mammogram within the past 2 years? No      5. For patients aged 21-65: Has the patient had a pap smear?  No

## 2022-11-01 ENCOUNTER — HOSPITAL ENCOUNTER (OUTPATIENT)
Dept: CT IMAGING | Age: 62
Discharge: HOME | End: 2022-11-01
Attending: INTERNAL MEDICINE
Payer: COMMERCIAL

## 2022-11-01 ENCOUNTER — HOSPITAL ENCOUNTER (OUTPATIENT)
Dept: VASCULAR SURGERY | Age: 62
Discharge: HOME | End: 2022-11-01
Attending: INTERNAL MEDICINE
Payer: COMMERCIAL

## 2022-11-01 DIAGNOSIS — I65.23 BILATERAL CAROTID ARTERY STENOSIS: ICD-10-CM

## 2022-11-01 DIAGNOSIS — I67.89 CEREBRAL MICROVASCULAR DISEASE: Primary | ICD-10-CM

## 2022-11-01 DIAGNOSIS — Z87.891 PERSONAL HISTORY OF TOBACCO USE, PRESENTING HAZARDS TO HEALTH: ICD-10-CM

## 2022-11-01 LAB
LEFT CCA DIST DIAS: 28.6 CM/S
LEFT CCA DIST SYS: 71.6 CM/S
LEFT CCA PROX DIAS: 18.1 CM/S
LEFT CCA PROX SYS: 89.9 CM/S
LEFT ECA DIAS: 11.6 CM/S
LEFT ECA SYS: 40.3 CM/S
LEFT ICA DIST DIAS: 44.2 CM/S
LEFT ICA DIST SYS: 79.4 CM/S
LEFT ICA MID DIAS: 39 CM/S
LEFT ICA MID SYS: 83.3 CM/S
LEFT ICA PROX DIAS: 28.6 CM/S
LEFT ICA PROX SYS: 59.9 CM/S
LEFT ICA/CCA SYS: 1.2 NO UNITS
LEFT VERTEBRAL DIAS: 19.4 CM/S
LEFT VERTEBRAL SYS: 41.6 CM/S
RIGHT CCA DIST DIAS: 27.9 CM/S
RIGHT CCA DIST SYS: 70.4 CM/S
RIGHT CCA PROX DIAS: 25.8 CM/S
RIGHT CCA PROX SYS: 75.5 CM/S
RIGHT ECA DIAS: 33.8 CM/S
RIGHT ECA SYS: 70.3 CM/S
RIGHT ICA DIST DIAS: 26 CM/S
RIGHT ICA DIST SYS: 57.3 CM/S
RIGHT ICA MID DIAS: 24.6 CM/S
RIGHT ICA MID SYS: 55.9 CM/S
RIGHT ICA PROX DIAS: 24.6 CM/S
RIGHT ICA PROX SYS: 61.2 CM/S
RIGHT ICA/CCA SYS: 0.9 NO UNITS
RIGHT VERTEBRAL DIAS: 12.9 CM/S
RIGHT VERTEBRAL SYS: 65.1 CM/S
VAS LEFT SUBCLAVIAN PROX EDV: 0 CM/S
VAS LEFT SUBCLAVIAN PROX PSV: 96.4 CM/S
VAS RIGHT SUBCLAVIAN PROX EDV: 0 CM/S
VAS RIGHT SUBCLAVIAN PROX PSV: 71.4 CM/S

## 2022-11-01 PROCEDURE — 71271 CT THORAX LUNG CANCER SCR C-: CPT

## 2022-11-01 PROCEDURE — 93880 EXTRACRANIAL BILAT STUDY: CPT

## 2022-11-01 PROCEDURE — 93880 EXTRACRANIAL BILAT STUDY: CPT | Performed by: PSYCHIATRY & NEUROLOGY

## 2022-11-02 NOTE — PROGRESS NOTES
Please call the patient regarding his diagnostic evaluation. No evidence of lung cancer on low-dose CT scan. Scattered small pulmonary nodules. Moderate to severe centrilobular and paraseptal emphysema. He should quit smoking. Repeat low-dose CT chest in 1 year.

## 2022-12-13 DIAGNOSIS — F51.04 CHRONIC INSOMNIA: ICD-10-CM

## 2022-12-13 RX ORDER — ZOLPIDEM TARTRATE 12.5 MG/1
12.5 TABLET, FILM COATED, EXTENDED RELEASE ORAL
Qty: 90 TABLET | Refills: 3 | OUTPATIENT
Start: 2022-12-13

## 2022-12-20 DIAGNOSIS — F51.04 CHRONIC INSOMNIA: ICD-10-CM

## 2022-12-20 RX ORDER — ZOLPIDEM TARTRATE 12.5 MG/1
12.5 TABLET, FILM COATED, EXTENDED RELEASE ORAL
Qty: 90 TABLET | Refills: 0 | Status: SHIPPED | OUTPATIENT
Start: 2022-12-20

## 2022-12-20 NOTE — TELEPHONE ENCOUNTER
PCP: Dimas Butcher MD    Last appt: Visit date not found  Future Appointments   Date Time Provider [place] Antonio [place]   4/14/2023 11:00 AM Dimas Butcher MD [facility] AMB       Requested Prescriptions     Pending Prescriptions Disp Refills    zolpidem CR (AMBIEN CR) 12.5 mg tablet 90 Tablet 0     Sig: Take 1 Tablet by mouth nightly.        Prior labs and Blood pressures:  BP Readings from Last 3 Encounters:   10/14/22 130/70   05/31/22 (!) 172/119   05/16/22 (!) 119/90     Lab Results   Component Value Date/Time    Sodium 141 10/07/2022 09:42 AM    Potassium 4.1 10/07/2022 09:42 AM    Chloride 108 10/07/2022 09:42 AM    CO2 28 10/07/2022 09:42 AM    Anion gap 5 10/07/2022 09:42 AM    Glucose 101 (H) 10/07/2022 09:42 AM    BUN 19 10/07/2022 09:42 AM    Creatinine 1.01 10/07/2022 09:42 AM    BUN/Creatinine ratio 19 10/07/2022 09:42 AM    GFR est AA >60 09/08/2021 10:20 AM    GFR est non-AA >60 09/08/2021 10:20 AM    Calcium 9.4 10/07/2022 09:42 AM     Lab Results   Component Value Date/Time    Hemoglobin A1c 6.0 (H) 10/07/2022 09:42 AM     Lab Results   Component Value Date/Time    Cholesterol, total 142 10/07/2022 09:42 AM    HDL Cholesterol 45 10/07/2022 09:42 AM    LDL, calculated 76 10/07/2022 09:42 AM    VLDL, calculated 21 10/07/2022 09:42 AM    Triglyceride 105 10/07/2022 09:42 AM    CHOL/HDL Ratio 3.2 10/07/2022 09:42 AM     Lab Results   Component Value Date/Time    Vitamin D 25-Hydroxy 39.5 02/11/2021 11:55 AM       Lab Results   Component Value Date/Time    TSH 0.93 09/08/2021 10:20 AM    TSH, 3rd generation 0.85 08/27/2020 10:21 AM

## 2022-12-23 ENCOUNTER — OFFICE VISIT (OUTPATIENT)
Dept: INTERNAL MEDICINE CLINIC | Age: 62
End: 2022-12-23
Payer: COMMERCIAL

## 2022-12-23 VITALS
SYSTOLIC BLOOD PRESSURE: 134 MMHG | BODY MASS INDEX: 28.04 KG/M2 | RESPIRATION RATE: 16 BRPM | TEMPERATURE: 98.6 F | WEIGHT: 211.6 LBS | OXYGEN SATURATION: 98 % | DIASTOLIC BLOOD PRESSURE: 76 MMHG | HEIGHT: 73 IN | HEART RATE: 45 BPM

## 2022-12-23 DIAGNOSIS — H61.22 IMPACTED CERUMEN OF LEFT EAR: Primary | ICD-10-CM

## 2022-12-23 DIAGNOSIS — R05.9 COUGH, UNSPECIFIED TYPE: ICD-10-CM

## 2022-12-23 PROCEDURE — 99214 OFFICE O/P EST MOD 30 MIN: CPT | Performed by: INTERNAL MEDICINE

## 2022-12-23 PROCEDURE — 3078F DIAST BP <80 MM HG: CPT | Performed by: INTERNAL MEDICINE

## 2022-12-23 PROCEDURE — 3074F SYST BP LT 130 MM HG: CPT | Performed by: INTERNAL MEDICINE

## 2022-12-23 RX ORDER — LISINOPRIL 20 MG/1
20 TABLET ORAL DAILY
COMMUNITY

## 2022-12-23 RX ORDER — CEFUROXIME AXETIL 500 MG/1
500 TABLET ORAL 2 TIMES DAILY
Qty: 20 TABLET | Refills: 0 | Status: SHIPPED | OUTPATIENT
Start: 2022-12-23 | End: 2023-01-02

## 2022-12-23 NOTE — PROGRESS NOTES
Jemma López is a 58 y.o. male and presents with Sinus Infection (Going on for over ten days) and Ear Pain (Left ear pain, Going on for over ten days)  . Subjective:  Mr. Mi Allen presents today with complaint of sinus congestion and drainage. His symptoms have been present for least 2 weeks. He has had decreased hearing acuity per his left ear. He has a cough which has been fairly persistent or chronic. He had a low-dose lung CT scan done recently that showed no suspicious lesions but changes consistent with COPD and his smoking history. He has no fever or chills. He is not taking medication for this currently. He has some mild discomfort in his left ear. Past Medical History:   Diagnosis Date    At risk for sleep apnea 05/05/2022    stop bang 4 - faxed to PCP office    Bilateral carotid artery stenosis 02/2021    mild, <15%    Chronic insomnia 08/16/2018    Crohn's disease ([address] 08/14/2018    Current smoker     Depression 08/14/2018    Diverticulosis 08/14/2018    Emphysema lung ([address] 11/2022    Centrilobular, paraseptal - CT Finding on LDCT    Enlarged prostate 11/2020    CT Finding    Fibromyalgia 08/14/2018    Gastroesophageal reflux disease without esophagitis 08/16/2018    Hepatic steatosis 11/2020    CT Finding    History of colon polyps 08/14/2018    History of echocardiogram 09/2019    Left Ventricle: Normal cavity size and systolic function (ejection fraction normal). Mild concentric hypertrophy. Estimated left ventricular ejection fraction is 61 - 65%. Age-appropriate left ventricular diastolic function.     History of inguinal hernia repair     History of transient ischemic attack 2019    Hypercholesterolemia     IBS (irritable bowel syndrome) 08/14/2018    Onychomycosis 08/14/2018    Personal history of COVID-19 12/2021    Prediabetes     Primary hypertension     Pulmonary nodules 11/2022    Repeat LDCT 11/23    Reactive arthritis ([address] 08/14/2018    Sarcoidosis 08/14/2018     Past Surgical History:   Procedure Laterality Date    HX COLONOSCOPY      HX ENDOSCOPY      HX ORCHIECTOMY Left     HX ORTHOPAEDIC      L3-5 lumbar surgery    HX TONSILLECTOMY      HX WISDOM TEETH EXTRACTION       Allergies   Allergen Reactions    Remicade [Infliximab] Other (comments)     Unable to walk       Current Outpatient Medications   Medication Sig Dispense Refill    lisinopriL (PRINIVIL, ZESTRIL) 20 mg tablet Take 20 mg by mouth daily. cefUROXime (CEFTIN) 500 mg tablet Take 1 Tablet by mouth two (2) times a day for 10 days. 20 Tablet 0    zolpidem CR (AMBIEN CR) 12.5 mg tablet Take 1 Tablet by mouth nightly. 90 Tablet 0    pravastatin (PRAVACHOL) 40 mg tablet Take 1 Tablet by mouth nightly. 90 Tablet 3    DULoxetine (CYMBALTA) 60 mg capsule Take 1 Capsule by mouth in the morning. 90 Capsule 3    aspirin 81 mg chewable tablet Take 1 Tablet by mouth daily. 90 Tablet 0    traZODone (DESYREL) 50 mg tablet Take 1 Tablet by mouth nightly. 30 Tablet 0    dexlansoprazole (DEXILANT) 60 mg CpDB capsule (delayed release) Take 60 mg by mouth nightly.        Social History     Socioeconomic History    Marital status:     Number of children: 2   Occupational History    Occupation: [employer]   Tobacco Use    Smoking status: Every Day     Packs/day: 1.50     Years: 20.00     Pack years: 30.00     Types: Cigarettes    Smokeless tobacco: Never   Vaping Use    Vaping Use: Never used   Substance and Sexual Activity    Alcohol use: No    Drug use: Never     Social Determinants of Health     Financial Resource Strain: Low Risk     Difficulty of Paying Living Expenses: Not hard at all   Food Insecurity: No Food Insecurity    Worried About Running Out of Food in the Last Year: Never true    Ran Out of Food in the Last Year: Never true     Family History   Problem Relation Age of Onset    Hypertension Mother     Hypertension Father     Prostate Cancer Father     Stroke Father     Hypertension Brother Hypertension Paternal Grandfather     Colon Cancer Paternal Grandfather     Colon Cancer Paternal Uncle     No Known Problems Child        Review of Systems  Constitutional:  negative for fevers, chills, anorexia and weight loss  Eyes:    negative for visual disturbance and irritation  ENT:    negative for tinnitus,sore throat,hoarseness  Respiratory:     negative for c hemoptysis, dyspnea,wheezing  CV:    negative for chest pain, palpitations, lower extremity edema  GI:    negative for nausea, vomiting, diarrhea, abdominal pain,melena  Endo:               negative for polyuria,polydipsia,polyphagia,heat intolerance  Genitourinary : negative for frequency, dysuria and hematuria  Integumentary: negative for rash and pruritus  Hematologic:   negative for easy bruising and gum/nose bleeding  Musculoskel:  negative for myalgias, arthralgias, back pain, muscle weakness, joint pain  Neurological:   negative for headaches, dizziness, vertigo, memory problems and gait   Behavl/Psych:  negative for feelings of anxiety, depression, mood changes  ROS otherwise negative      Objective:  Visit Vitals  /76 (BP 1 Location: Left upper arm, BP Patient Position: Sitting, BP Cuff Size: Adult)   Pulse (!) 45   Temp 98.6 °F (37 °C) (Oral)   Resp 16   Ht 6' 1\" (1.854 m)   Wt 211 lb 9.6 oz (96 kg)   SpO2 98%   BMI 27.92 kg/m²     Physical Exam:   General appearance - alert, well appearing, and in no distress  Mental status - alert, oriented to person, place, and time  EYE-SHIVAM, EOMI, fundi normal, corneas normal, no foreign bodies  ENT left auditory canal occluded with cerumen, flushed with warm water and peroxide and attempted to remove with cerumen loop without success  Nose -erythematous and boggy bilaterally, maxillary sinuses reveal decreased translational light  Mouth - mucous membranes moist, pharynx normal without lesions  Neck - supple, no significant adenopathy   Chest - clear to auscultation, no wheezes, rales or rhonchi, symmetric air entry   Heart - normal rate, regular rhythm, normal S1, S2, no murmurs, rubs, clicks or gallops   Abdomen - soft, nontender, nondistended, no masses or organomegaly  Lymph- no adenopathy palpable  Ext-peripheral pulses normal, no pedal edema, no clubbing or cyanosis  Skin-Warm and dry. no hyperpigmentation, vitiligo, or suspicious lesions  Neuro -alert, oriented, normal speech, no focal findings or movement disorder noted      Assessment/Plan:  Diagnoses and all orders for this visit:    1. Impacted cerumen of left ear    2. Cough, unspecified type    Other orders  -     cefUROXime (CEFTIN) 500 mg tablet; Take 1 Tablet by mouth two (2) times a day for 10 days. ICD-10-CM ICD-9-CM    1. Impacted cerumen of left ear  H61.22 380.4       2. Cough, unspecified type  R05.9 786.2           Plan:    I recommended Mr. Villarreal use either an over-the-counter prep such as Debrox or hydrogen peroxide drops daily to his left ear for several days and follow-up in 1 week for a repeat attempt to clear the cerumen from his auditory canal.  He has had this problem previously and had to go to ENT for treatment in the past.  He will be placed on a course of antibiotics for probable underlying sinus infection. He will start an over-the-counter antihistamine for chronic postnasal drainage and cough. His cough may also be related to his smoking history, underlying COPD, or lisinopril. Follow-up in 1 week for resolution of his impacted cerumen with regular follow-up with Dr. Humphrey De Souza thereafter. I have reviewed with the patient details of the assessment and plan and all questions were answered. Relevent patient education was performed. Verbal and/or written instructions (see AVS) provided. The most recent lab findings were reviewed with the patient. Plan was discussed with patient who verbally expressed understanding. An After Visit Summary was printed and given to the patient.     Ganesh Beavers MD

## 2022-12-23 NOTE — PROGRESS NOTES
Kayli Diamond is a 58 y.o. male     Chief Complaint   Patient presents with    Sinus Infection     Going on for over ten days    Ear Pain     Left ear pain, Going on for over ten days       Visit Vitals  /76 (BP 1 Location: Left upper arm, BP Patient Position: Sitting, BP Cuff Size: Adult)   Pulse (!) 45   Temp 98.6 °F (37 °C) (Oral)   Resp 16   Ht 6' 1\" (1.854 m)   Wt 211 lb 9.6 oz (96 kg)   SpO2 98%   BMI 27.92 kg/m²       Health Maintenance Due   Topic Date Due    COVID-19 Vaccine (1) Never done    Pneumococcal 0-64 years (1 - PCV) Never done    Shingles Vaccine (1 of 2) Never done    Flu Vaccine (1) 08/01/2022         1. \"Have you been to the ER, urgent care clinic since your last visit? Hospitalized since your last visit? \" No    2. \"Have you seen or consulted any other health care providers outside of the [address] [place] [address] since your last visit? \" No     3. For patients aged 39-70: Has the patient had a colonoscopy / FIT/ Cologuard? Yes - no Care Gap present      If the patient is female:    4. For patients aged 41-77: Has the patient had a mammogram within the past 2 years? NA - based on age or sex      11. For patients aged 21-65: Has the patient had a pap smear?  NA - based on age or sex

## 2022-12-30 ENCOUNTER — OFFICE VISIT (OUTPATIENT)
Dept: INTERNAL MEDICINE CLINIC | Age: 62
End: 2022-12-30
Payer: COMMERCIAL

## 2022-12-30 VITALS
HEART RATE: 74 BPM | SYSTOLIC BLOOD PRESSURE: 130 MMHG | OXYGEN SATURATION: 98 % | BODY MASS INDEX: 27.57 KG/M2 | TEMPERATURE: 98.5 F | DIASTOLIC BLOOD PRESSURE: 78 MMHG | WEIGHT: 208 LBS | RESPIRATION RATE: 18 BRPM | HEIGHT: 73 IN

## 2022-12-30 DIAGNOSIS — H65.02 ACUTE SEROUS OTITIS MEDIA OF LEFT EAR, RECURRENCE NOT SPECIFIED: ICD-10-CM

## 2022-12-30 DIAGNOSIS — H61.22 CERUMEN DEBRIS ON TYMPANIC MEMBRANE OF LEFT EAR: Primary | ICD-10-CM

## 2022-12-30 PROCEDURE — 3074F SYST BP LT 130 MM HG: CPT | Performed by: INTERNAL MEDICINE

## 2022-12-30 PROCEDURE — 99213 OFFICE O/P EST LOW 20 MIN: CPT | Performed by: INTERNAL MEDICINE

## 2022-12-30 PROCEDURE — 3078F DIAST BP <80 MM HG: CPT | Performed by: INTERNAL MEDICINE

## 2022-12-30 RX ORDER — PREDNISONE 10 MG/1
10 TABLET ORAL SEE ADMIN INSTRUCTIONS
Qty: 21 TABLET | Refills: 0 | Status: SHIPPED | OUTPATIENT
Start: 2022-12-30

## 2022-12-30 NOTE — PROGRESS NOTES
Fairy Phalen. is a 58 y.o. male and presents with Ear Fullness (L)  . Subjective:  Mr. Juan Antonio Monsivais presents today for follow-up of fullness in his left ear. He denies any significant pain. He does have a fullness and decreased hearing acuity. He was seen about a week ago and his ears were irrigated with water removing a minimal amount of cerumen. He has been doing hydrogen peroxide drops in his ear for the past few days and is returned today for reevaluation. He has no fever or chills. He does have some sinus congestion and drainage. He is completing a course of Ceftin. Past Medical History:   Diagnosis Date    At risk for sleep apnea 05/05/2022    stop bang 4 - faxed to PCP office    Bilateral carotid artery stenosis 02/2021    mild, <15%    Chronic insomnia 08/16/2018    Crohn's disease ([address] 08/14/2018    Current smoker     Depression 08/14/2018    Diverticulosis 08/14/2018    Emphysema lung ([address] 11/2022    Centrilobular, paraseptal - CT Finding on LDCT    Enlarged prostate 11/2020    CT Finding    Fibromyalgia 08/14/2018    Gastroesophageal reflux disease without esophagitis 08/16/2018    Hepatic steatosis 11/2020    CT Finding    History of colon polyps 08/14/2018    History of echocardiogram 09/2019    Left Ventricle: Normal cavity size and systolic function (ejection fraction normal). Mild concentric hypertrophy. Estimated left ventricular ejection fraction is 61 - 65%. Age-appropriate left ventricular diastolic function.     History of inguinal hernia repair     History of transient ischemic attack 2019    Hypercholesterolemia     IBS (irritable bowel syndrome) 08/14/2018    Onychomycosis 08/14/2018    Personal history of COVID-19 12/2021    Prediabetes     Primary hypertension     Pulmonary nodules 11/2022    Repeat LDCT 11/23    Reactive arthritis ([address] 08/14/2018    Sarcoidosis 08/14/2018     Past Surgical History:   Procedure Laterality Date    HX COLONOSCOPY      HX ENDOSCOPY      HX ORCHIECTOMY Left     HX ORTHOPAEDIC      L3-5 lumbar surgery    HX TONSILLECTOMY      HX WISDOM TEETH EXTRACTION       Allergies   Allergen Reactions    Remicade [Infliximab] Other (comments)     Unable to walk       Current Outpatient Medications   Medication Sig Dispense Refill    predniSONE (STERAPRED DS) 10 mg dose pack Take 1 Tablet by mouth See Admin Instructions. See administration instruction per 10mg dose pack 21 Tablet 0    lisinopriL (PRINIVIL, ZESTRIL) 20 mg tablet Take 20 mg by mouth daily. cefUROXime (CEFTIN) 500 mg tablet Take 1 Tablet by mouth two (2) times a day for 10 days. 20 Tablet 0    zolpidem CR (AMBIEN CR) 12.5 mg tablet Take 1 Tablet by mouth nightly. 90 Tablet 0    pravastatin (PRAVACHOL) 40 mg tablet Take 1 Tablet by mouth nightly. 90 Tablet 3    DULoxetine (CYMBALTA) 60 mg capsule Take 1 Capsule by mouth in the morning. 90 Capsule 3    aspirin 81 mg chewable tablet Take 1 Tablet by mouth daily. 90 Tablet 0    traZODone (DESYREL) 50 mg tablet Take 1 Tablet by mouth nightly. 30 Tablet 0    dexlansoprazole (DEXILANT) 60 mg CpDB capsule (delayed release) Take 60 mg by mouth nightly.        Social History     Socioeconomic History    Marital status:     Number of children: 2   Occupational History    Occupation: [employer]   Tobacco Use    Smoking status: Every Day     Packs/day: 1.50     Years: 20.00     Pack years: 30.00     Types: Cigarettes    Smokeless tobacco: Never   Vaping Use    Vaping Use: Never used   Substance and Sexual Activity    Alcohol use: No    Drug use: Never     Social Determinants of Health     Financial Resource Strain: Low Risk     Difficulty of Paying Living Expenses: Not hard at all   Food Insecurity: No Food Insecurity    Worried About Running Out of Food in the Last Year: Never true    Ran Out of Food in the Last Year: Never true     Family History   Problem Relation Age of Onset    Hypertension Mother     Hypertension Father     Prostate Cancer Father     Stroke Father     Hypertension Brother     Hypertension Paternal Grandfather     Colon Cancer Paternal Grandfather     Colon Cancer Paternal Uncle     No Known Problems Child        Review of Systems  Constitutional:  negative for fevers, chills, anorexia and weight loss  Eyes:    negative for visual disturbance and irritation  ENT:    negative for tinnitus,sore throat,nasal congestion,ear pains. hoarseness  Respiratory:     negative for cough, hemoptysis, dyspnea,wheezing  CV:    negative for chest pain, palpitations, lower extremity edema  GI:    negative for nausea, vomiting, diarrhea, abdominal pain,melena  Endo:               negative for polyuria,polydipsia,polyphagia,heat intolerance  Genitourinary : negative for frequency, dysuria and hematuria  Integumentary: negative for rash and pruritus  Hematologic:   negative for easy bruising and gum/nose bleeding  Musculoskel:  negative for myalgias, arthralgias, back pain, muscle weakness, joint pain  Neurological:   negative for headaches, dizziness, vertigo, memory problems and gait   Behavl/Psych:  negative for feelings of anxiety, depression, mood changes  ROS otherwise negative      Objective:  Visit Vitals  /78 (BP 1 Location: Right upper arm, BP Patient Position: Sitting, BP Cuff Size: Adult)   Pulse 74   Temp 98.5 °F (36.9 °C) (Oral)   Resp 18   Ht 6' 1\" (1.854 m)   Wt 208 lb (94.3 kg)   SpO2 98%   BMI 27.44 kg/m²     Physical Exam:   General appearance - alert, well appearing, and in no distress  Mental status - alert, oriented to person, place, and time  EYE-SHIVAM, EOMI, fundi normal, corneas normal, no foreign bodies  ENT-external auditory canal with debris, after irrigation and soaking with hydrogen peroxide most debris was clear with only some residual debris left behind, the tympanic membrane was visualized and is without injection or erythema, the light reflex is dulled with serous fluid behind the eardrum  Nose -mildly erythematous and boggy bilaterally  Mouth - mucous membranes moist, pharynx normal without lesions  Neck - supple, no significant adenopathy   Chest - clear to auscultation, no wheezes, rales or rhonchi, symmetric air entry   Heart - normal rate, regular rhythm, normal S1, S2, no murmurs, rubs, clicks or gallops   Abdomen - soft, nontender, nondistended, no masses or organomegaly  Lymph- no adenopathy palpable  Ext-peripheral pulses normal, no pedal edema, no clubbing or cyanosis  Skin-Warm and dry. no hyperpigmentation, vitiligo, or suspicious lesions  Neuro -alert, oriented, normal speech, no focal findings or movement disorder noted      Assessment/Plan:  Diagnoses and all orders for this visit:    1. Cerumen debris on tympanic membrane of left ear    2. Acute serous otitis media of left ear, recurrence not specified    Other orders  -     predniSONE (STERAPRED DS) 10 mg dose pack; Take 1 Tablet by mouth See Admin Instructions. See administration instruction per 10mg dose pack          ICD-10-CM ICD-9-CM    1. Cerumen debris on tympanic membrane of left ear  H61.22 380.4       2. Acute serous otitis media of left ear, recurrence not specified  H65.02 381.01           Plan:    Finish Ceftin as prescribed. Sterapred Earlis Loges as prescribed. Zyrtec 10 mg nightly. He has previously used a nasal steroid but had epistaxis with this. He will complete his steroid Dosepak and continue Zyrtec with which I expect resolution of his symptoms. I have reviewed with the patient details of the assessment and plan and all questions were answered. Relevent patient education was performed. Verbal and/or written instructions (see AVS) provided. The most recent lab findings were reviewed with the patient. Plan was discussed with patient who verbally expressed understanding. An After Visit Summary was printed and given to the patient.     Reji Dominguez MD

## 2022-12-30 NOTE — PROGRESS NOTES
Darien Garland is a 58 y.o. male presenting for Ear Fullness (L)  . 1. Have you been to the ER, urgent care clinic since your last visit? Hospitalized since your last visit? No    2. Have you seen or consulted any other health care providers outside of the [address] [place] [address] since your last visit? Include any pap smears or colon screening. No    No flowsheet data found. Abuse Screening Questionnaire 3/2/2021   Do you ever feel afraid of your partner? N   Are you in a relationship with someone who physically or mentally threatens you? N   Is it safe for you to go home? Y       3 most recent PHQ Screens 12/23/2022   Little interest or pleasure in doing things Not at all   Feeling down, depressed, irritable, or hopeless Not at all   Total Score PHQ 2 0   Trouble falling or staying asleep, or sleeping too much Not at all   Feeling tired or having little energy Not at all   Poor appetite, weight loss, or overeating Not at all   Feeling bad about yourself - or that you are a failure or have let yourself or your family down Not at all   Trouble concentrating on things such as school, work, reading, or watching TV Not at all   Moving or speaking so slowly that other people could have noticed; or the opposite being so fidgety that others notice Not at all   Thoughts of being better off dead, or hurting yourself in some way Not at all   PHQ 9 Score 0   How difficult have these problems made it for you to do your work, take care of your home and get along with others -       There are no discontinued medications.

## 2023-02-06 DIAGNOSIS — I10 ESSENTIAL (PRIMARY) HYPERTENSION: ICD-10-CM

## 2023-02-06 RX ORDER — LISINOPRIL 20 MG/1
TABLET ORAL
Qty: 90 TABLET | Refills: 1 | Status: SHIPPED | OUTPATIENT
Start: 2023-02-06

## 2023-02-24 ENCOUNTER — OFFICE VISIT (OUTPATIENT)
Dept: SURGERY | Age: 63
End: 2023-02-24
Payer: COMMERCIAL

## 2023-02-24 VITALS
OXYGEN SATURATION: 97 % | HEART RATE: 83 BPM | SYSTOLIC BLOOD PRESSURE: 128 MMHG | HEIGHT: 73 IN | RESPIRATION RATE: 20 BRPM | WEIGHT: 204.6 LBS | DIASTOLIC BLOOD PRESSURE: 76 MMHG | BODY MASS INDEX: 27.11 KG/M2 | TEMPERATURE: 97.3 F

## 2023-02-24 DIAGNOSIS — K50.919 CROHN'S DISEASE WITH COMPLICATION, UNSPECIFIED GASTROINTESTINAL TRACT LOCATION (HCC): Chronic | ICD-10-CM

## 2023-02-24 DIAGNOSIS — R10.30 LOWER ABDOMINAL PAIN: Primary | ICD-10-CM

## 2023-02-24 PROBLEM — K40.90 RIGHT INGUINAL HERNIA: Status: RESOLVED | Noted: 2022-03-23 | Resolved: 2023-02-24

## 2023-02-24 PROCEDURE — 3078F DIAST BP <80 MM HG: CPT | Performed by: SURGERY

## 2023-02-24 PROCEDURE — 99214 OFFICE O/P EST MOD 30 MIN: CPT | Performed by: SURGERY

## 2023-02-24 PROCEDURE — 3074F SYST BP LT 130 MM HG: CPT | Performed by: SURGERY

## 2023-02-24 NOTE — PROGRESS NOTES
HISTORY OF PRESENT ILLNESS  Daren Malik is a 58 y.o. male who comes in for consultation by Lola Zuñiga MD for a hernia  HPI  He has been having bilateral lower abdominal discomfort and constipation for over a month. It is more on the right than the left. He also reports some back pain. He has some issues with constipation and blood in his stool as well. He has a hx Crohns disease and has not seen GI (Larissa) in 18 months. He has not had anything similar in the past but did have a left orchiectomy for torsion. I did a RIH with mesh 5/16/2022. He was worried that it may be related to the hernia. He denies nausea, vomiting, dysuria or hematuria. He has crohns disease and has some diarrhea at times but is currently not on any therapy. He does smoke. Past Medical History:   Diagnosis Date    At risk for sleep apnea 05/05/2022    stop bang 4 - faxed to PCP office    Bilateral carotid artery stenosis 02/2021    mild, <15%    Chronic insomnia 08/16/2018    Crohn's disease ([facility] [address] 08/14/2018    Current smoker     Depression 08/14/2018    Diverticulosis 08/14/2018    Emphysema lung ([address] 11/2022    Centrilobular, paraseptal - CT Finding on LDCT    Enlarged prostate 11/2020    CT Finding    Fibromyalgia 08/14/2018    Gastroesophageal reflux disease without esophagitis 08/16/2018    GERD (gastroesophageal reflux disease)     Hepatic steatosis 11/2020    CT Finding    History of colon polyps 08/14/2018    History of echocardiogram 09/2019    Left Ventricle: Normal cavity size and systolic function (ejection fraction normal). Mild concentric hypertrophy. Estimated left ventricular ejection fraction is 61 - 65%. Age-appropriate left ventricular diastolic function.     History of inguinal hernia repair     History of transient ischemic attack 2019    Hypercholesterolemia     IBS (irritable bowel syndrome) 08/14/2018    Onychomycosis 08/14/2018    Personal history of COVID-19 12/2021    Prediabetes Primary hypertension     Pulmonary nodules 11/2022    Repeat LDCT 11/23    Reactive arthritis ([facility] [address] 08/14/2018    Sarcoidosis 08/14/2018    Stroke ([facility] [address] 2019     Past Surgical History:   Procedure Laterality Date    HX COLONOSCOPY      HX ENDOSCOPY      HX HEMORRHOIDECTOMY  1980    HX ORCHIECTOMY Left     HX ORTHOPAEDIC  2010    L3-5 lumbar surgery    HX TONSILLECTOMY      HX WISDOM TEETH EXTRACTION       Family History   Problem Relation Age of Onset    Hypertension Mother     Hypertension Father     Prostate Cancer Father     Stroke Father     Hypertension Brother     Hypertension Paternal Grandfather     Colon Cancer Paternal Grandfather     Colon Cancer Paternal Uncle     No Known Problems Child      Social History     Tobacco Use    Smoking status: Every Day     Packs/day: 1.50     Years: 40.00     Pack years: 60.00     Types: Cigarettes    Smokeless tobacco: Never   Vaping Use    Vaping Use: Never used   Substance Use Topics    Alcohol use: No    Drug use: Never     Current Outpatient Medications   Medication Sig    lisinopriL (PRINIVIL, ZESTRIL) 20 mg tablet TAKE 1 TABLET BY MOUTH IN THE MORNING FOR 90 DAYS.    zolpidem CR (AMBIEN CR) 12.5 mg tablet Take 1 Tablet by mouth nightly. pravastatin (PRAVACHOL) 40 mg tablet Take 1 Tablet by mouth nightly. DULoxetine (CYMBALTA) 60 mg capsule Take 1 Capsule by mouth in the morning. aspirin 81 mg chewable tablet Take 1 Tablet by mouth daily. traZODone (DESYREL) 50 mg tablet Take 1 Tablet by mouth nightly. dexlansoprazole (DEXILANT) 60 mg CpDB capsule (delayed release) Take 60 mg by mouth nightly. predniSONE (STERAPRED DS) 10 mg dose pack Take 1 Tablet by mouth See Admin Instructions. See administration instruction per 10mg dose pack (Patient not taking: Reported on 2/24/2023)     No current facility-administered medications for this visit.      Allergies   Allergen Reactions    Remicade [Infliximab] Other (comments)     Unable to walk         Review of Systems   Constitutional:  Negative for chills, diaphoresis, fever, malaise/fatigue and weight loss. HENT:  Negative for congestion, ear pain and sore throat. Eyes:  Negative for blurred vision and pain. Respiratory:  Positive for cough. Negative for hemoptysis, sputum production, shortness of breath, wheezing and stridor. Sleep apnea   Cardiovascular:  Negative for chest pain, palpitations, orthopnea, claudication, leg swelling and PND. Gastrointestinal:  Positive for abdominal pain, blood in stool and constipation. Negative for diarrhea, heartburn, melena, nausea and vomiting. Genitourinary:  Negative for dysuria, flank pain, frequency, hematuria and urgency. Musculoskeletal:  Positive for back pain and joint pain. Negative for myalgias and neck pain. Skin:  Negative for itching and rash. Neurological:  Negative for dizziness, tremors, focal weakness, seizures, weakness and headaches. Hx TIA but no residual   Endo/Heme/Allergies:  Negative for polydipsia. Psychiatric/Behavioral:  Negative for depression and memory loss. The patient is not nervous/anxious. Visit Vitals  /76 (BP 1 Location: Left upper arm, BP Patient Position: Sitting)   Pulse 83   Temp 97.3 °F (36.3 °C) (Temporal)   Resp 20   Ht 6' 1\" (1.854 m)   Wt 92.8 kg (204 lb 9.6 oz)   SpO2 97%   BMI 26.99 kg/m²       Physical Exam  Constitutional:       General: He is not in acute distress. Appearance: Normal appearance. He is well-developed. He is not diaphoretic. HENT:      Head: Normocephalic and atraumatic. Mouth/Throat:      Pharynx: No oropharyngeal exudate. Eyes:      General: No scleral icterus. Conjunctiva/sclera: Conjunctivae normal.      Pupils: Pupils are equal, round, and reactive to light. Neck:      Thyroid: No thyromegaly. Trachea: No tracheal deviation. Cardiovascular:      Rate and Rhythm: Normal rate and regular rhythm. Heart sounds: Normal heart sounds.  No murmur heard.    No friction rub. No gallop. Pulmonary:      Effort: Pulmonary effort is normal. No respiratory distress. Breath sounds: Normal breath sounds. No stridor. No wheezing or rales. Abdominal:      General: Bowel sounds are normal. There is no distension. Palpations: Abdomen is soft. There is no mass. Tenderness: There is abdominal tenderness in the right lower quadrant. There is no right CVA tenderness, left CVA tenderness, guarding or rebound. Negative signs include Conway's sign, Rovsing's sign and McBurney's sign. Hernia: No hernia is present. There is no hernia in the ventral area, left inguinal area or right inguinal area. Genitourinary:     Penis: Normal and circumcised. Testes: Cremasteric reflex is present. Right: Mass, tenderness or swelling not present. Right testis is descended. Cremasteric reflex is present. Epididymis:      Right: Normal.          Comments: No left testicle  Musculoskeletal:         General: No tenderness. Normal range of motion. Cervical back: Normal range of motion and neck supple. Lymphadenopathy:      Cervical: No cervical adenopathy. Lower Body: No right inguinal adenopathy. No left inguinal adenopathy. Skin:     General: Skin is warm and dry. Findings: No erythema or rash. Neurological:      Mental Status: He is alert and oriented to person, place, and time. Cranial Nerves: No cranial nerve deficit. Coordination: Coordination normal.   Psychiatric:         Behavior: Behavior normal.         Thought Content: Thought content normal.         Judgment: Judgment normal.       ASSESSMENT and PLAN  1. Hx right inguinal hernia. No suggestion of recurrence on exam    2. Hx TIA  On statin  3. Essential hypertension. Stable on rx  4. Insomnia. On rx  5. GERD improved on rx  6. Fibromyalgia. On rx  7. Has had Covid-19 twice and is not vaccinated  8. Tobacco abuse. Again urged cessation  9. Crohns disease. Abdominal pain and GI symptoms suggest possible reactivation of his Crohns disease. He will follow up with Dr Edda Goncalves or go to ER if it worsens. He does not have an acute abdomen at this time.         Carmen Renee MD FACS

## 2023-02-24 NOTE — PROGRESS NOTES
Identified pt with two pt identifiers(name and ). Reviewed record in preparation for visit and have obtained necessary documentation. All patient medications has been reviewed. Chief Complaint   Patient presents with    Abdominal Pain     Having lower abdominal and back pain, s/p Right inguinal hernia repair with mesh on 22        Health Maintenance Due   Topic    COVID-19 Vaccine (1)    Pneumococcal 0-64 years (1 - PCV)    Shingles Vaccine (1 of 2)    Flu Vaccine (1)       Vitals:    23 0925   BP: 128/76   Pulse: 83   Resp: 20   Temp: 97.3 °F (36.3 °C)   TempSrc: Temporal   SpO2: 97%   Weight: 92.8 kg (204 lb 9.6 oz)   Height: 6' 1\" (1.854 m)   PainSc:   3   PainLoc: Abdomen       4. Have you been to the ER, urgent care clinic since your last visit? Hospitalized since your last visit? No    5. Have you seen or consulted any other health care providers outside of the [address] [place] [address] since your last visit? Include any pap smears or colon screening. No      Patient is accompanied by wife I have received verbal consent from Yue Mcfarland. to discuss any/all medical information while they are present in the room.

## 2023-03-03 ENCOUNTER — TRANSCRIBE ORDER (OUTPATIENT)
Dept: SCHEDULING | Age: 63
End: 2023-03-03

## 2023-03-03 DIAGNOSIS — K50.80 CROHN'S DISEASE OF SMALL AND LARGE INTESTINES (HCC): Primary | ICD-10-CM

## 2023-03-11 DIAGNOSIS — F51.04 CHRONIC INSOMNIA: ICD-10-CM

## 2023-03-13 RX ORDER — ZOLPIDEM TARTRATE 12.5 MG/1
12.5 TABLET, FILM COATED, EXTENDED RELEASE ORAL
Qty: 90 TABLET | Refills: 0 | Status: SHIPPED | OUTPATIENT
Start: 2023-03-13

## 2023-03-17 ENCOUNTER — HOSPITAL ENCOUNTER (OUTPATIENT)
Dept: CT IMAGING | Age: 63
Discharge: HOME | End: 2023-03-17
Attending: INTERNAL MEDICINE
Payer: COMMERCIAL

## 2023-03-17 DIAGNOSIS — K50.80 CROHN'S DISEASE OF SMALL AND LARGE INTESTINES (HCC): ICD-10-CM

## 2023-03-17 PROCEDURE — 74177 CT ABD & PELVIS W/CONTRAST: CPT

## 2023-03-17 PROCEDURE — 74011000636 HC RX REV CODE- 636: Performed by: INTERNAL MEDICINE

## 2023-03-17 RX ADMIN — IOMEPROL INJECTION 200 ML: 714 INJECTION, SOLUTION INTRAVASCULAR at 15:56

## 2023-03-27 RX ORDER — TRAZODONE HYDROCHLORIDE 50 MG/1
50 TABLET ORAL
Qty: 30 TABLET | Refills: 0 | Status: SHIPPED | OUTPATIENT
Start: 2023-03-27

## 2023-03-27 NOTE — TELEPHONE ENCOUNTER
PCP: Sydnee Arthur MD    Last appt: 12/30/2022  Future Appointments   Date Time Provider Juan Antonio Cruz   4/14/2023 11:00 AM Sydnee Arthur MD [facility] AMB       Requested Prescriptions     Pending Prescriptions Disp Refills    traZODone (DESYREL) 50 mg tablet 30 Tablet 0     Sig: Take 1 Tablet by mouth nightly.        Prior labs and Blood pressures:  BP Readings from Last 3 Encounters:   02/24/23 128/76   12/30/22 130/78   12/23/22 134/76     Lab Results   Component Value Date/Time    Sodium 141 10/07/2022 09:42 AM    Potassium 4.1 10/07/2022 09:42 AM    Chloride 108 10/07/2022 09:42 AM    CO2 28 10/07/2022 09:42 AM    Anion gap 5 10/07/2022 09:42 AM    Glucose 101 (H) 10/07/2022 09:42 AM    BUN 19 10/07/2022 09:42 AM    Creatinine 1.01 10/07/2022 09:42 AM    BUN/Creatinine ratio 19 10/07/2022 09:42 AM    GFR est AA >60 09/08/2021 10:20 AM    GFR est non-AA >60 09/08/2021 10:20 AM    Calcium 9.4 10/07/2022 09:42 AM     Lab Results   Component Value Date/Time    Hemoglobin A1c 6.0 (H) 10/07/2022 09:42 AM     Lab Results   Component Value Date/Time    Cholesterol, total 142 10/07/2022 09:42 AM    HDL Cholesterol 45 10/07/2022 09:42 AM    LDL, calculated 76 10/07/2022 09:42 AM    VLDL, calculated 21 10/07/2022 09:42 AM    Triglyceride 105 10/07/2022 09:42 AM    CHOL/HDL Ratio 3.2 10/07/2022 09:42 AM     Lab Results   Component Value Date/Time    Vitamin D 25-Hydroxy 39.5 02/11/2021 11:55 AM       Lab Results   Component Value Date/Time    TSH 0.93 09/08/2021 10:20 AM    TSH, 3rd generation 0.85 08/27/2020 10:21 AM

## 2023-05-23 RX ORDER — IPRATROPIUM BROMIDE 42 UG/1
SPRAY, METERED NASAL
Qty: 15 ML | Refills: 0 | Status: SHIPPED | OUTPATIENT
Start: 2023-05-23

## 2023-05-23 RX ORDER — FLUTICASONE PROPIONATE 50 MCG
SPRAY, SUSPENSION (ML) NASAL
Qty: 1 EACH | Refills: 1 | Status: SHIPPED | OUTPATIENT
Start: 2023-05-23

## 2023-05-23 NOTE — TELEPHONE ENCOUNTER
Requested Prescriptions     Pending Prescriptions Disp Refills    fluticasone (FLONASE) 50 MCG/ACT nasal spray [Pharmacy Med Name: FLUTICASONE PROP 50 MCG SPRAY] 1 each 1     Sig: USE 2 SPRAYS BY BOTH NOSTRILS ROUTE DAILY. INDICATIONS: CHRONIC STUFFY AND RUNNY NOSE NOT CAUSED BY ALLERGIES    ipratropium (ATROVENT) 0.06 % nasal spray [Pharmacy Med Name: IPRATROPIUM 0.06% SPRAY] 15 mL 0     Sig: USE 2 SPRAYS BY BOTH NOSTRILS ROUTE FOUR (4) TIMES DAILY FOR 3 DAYS. INDICATIONS: RUNNY NOSE       RX refill request from the patient/pharmacy. Patient last seen 4/14/23 with labs, and next appt. scheduled for 8/21/23.

## 2023-06-03 DIAGNOSIS — F51.04 PSYCHOPHYSIOLOGIC INSOMNIA: ICD-10-CM

## 2023-06-05 RX ORDER — ZOLPIDEM TARTRATE 12.5 MG/1
TABLET, FILM COATED, EXTENDED RELEASE ORAL
Qty: 90 TABLET | Refills: 0 | Status: SHIPPED | OUTPATIENT
Start: 2023-06-05 | End: 2023-09-03

## 2023-06-05 NOTE — TELEPHONE ENCOUNTER
Requested Prescriptions     Pending Prescriptions Disp Refills    zolpidem (AMBIEN CR) 12.5 MG extended release tablet [Pharmacy Med Name: ZOLPIDEM TART ER 12.5 MG TAB] 90 tablet 0     Sig: TAKE 1 TABLET BY MOUTH NIGHTLY       RX refill request from the patient/pharmacy. Patient last seen 4/14/23 with labs, and next appt. scheduled for 8/21/23.

## 2023-06-19 RX ORDER — IPRATROPIUM BROMIDE 42 UG/1
SPRAY, METERED NASAL
Qty: 15 ML | Refills: 0 | Status: SHIPPED | OUTPATIENT
Start: 2023-06-19 | End: 2023-06-21 | Stop reason: SDUPTHER

## 2023-06-19 NOTE — TELEPHONE ENCOUNTER
Last Refill: 5-23-23  Last Visit: 4/14/2023 Rebecca Lay  Next Visit: 8/21/2023 Kailyn Schmitt    Requested Prescriptions     Pending Prescriptions Disp Refills    ipratropium (ATROVENT) 0.06 % nasal spray 15 mL 0     Sig: USE 2 SPRAYS BY BOTH NOSTRILS ROUTE FOUR (4) TIMES DAILY FOR 3 DAYS.  INDICATIONS: RUNNY NOSE

## 2023-06-21 RX ORDER — IPRATROPIUM BROMIDE 42 UG/1
SPRAY, METERED NASAL
Qty: 15 ML | Refills: 0 | Status: SHIPPED | OUTPATIENT
Start: 2023-06-21

## 2023-06-21 NOTE — TELEPHONE ENCOUNTER
Requested Prescriptions     Pending Prescriptions Disp Refills    ipratropium (ATROVENT) 0.06 % nasal spray 15 mL 0     Sig: USE 2 SPRAYS BY BOTH NOSTRILS ROUTE FOUR (4) TIMES DAILY FOR 3 DAYS. INDICATIONS: RUNNY NOSE       RX refill request from the patient/pharmacy. Patient last seen 4/14/23 with labs, and next appt. scheduled for 8/21/23.

## 2023-07-17 NOTE — TELEPHONE ENCOUNTER
Last Refill: 7-22-22  Last Visit: 4/14/2023 Ede Galan  Next Visit: 8/21/2023 Kemi Carolina    Requested Prescriptions     Pending Prescriptions Disp Refills    DULoxetine (CYMBALTA) 60 MG extended release capsule 90 capsule 0     Sig: Take 1 capsule by mouth daily    pravastatin (PRAVACHOL) 40 MG tablet 90 tablet 0     Sig: Take 1 tablet by mouth daily

## 2023-07-18 RX ORDER — PRAVASTATIN SODIUM 40 MG
40 TABLET ORAL DAILY
Qty: 90 TABLET | Refills: 0 | Status: SHIPPED | OUTPATIENT
Start: 2023-07-18

## 2023-07-18 RX ORDER — DULOXETIN HYDROCHLORIDE 60 MG/1
60 CAPSULE, DELAYED RELEASE ORAL DAILY
Qty: 90 CAPSULE | Refills: 0 | Status: SHIPPED | OUTPATIENT
Start: 2023-07-18

## 2023-08-21 ENCOUNTER — OFFICE VISIT (OUTPATIENT)
Facility: CLINIC | Age: 63
End: 2023-08-21
Payer: COMMERCIAL

## 2023-08-21 VITALS
HEART RATE: 83 BPM | BODY MASS INDEX: 27.55 KG/M2 | OXYGEN SATURATION: 95 % | SYSTOLIC BLOOD PRESSURE: 120 MMHG | HEIGHT: 73 IN | WEIGHT: 207.9 LBS | DIASTOLIC BLOOD PRESSURE: 80 MMHG | RESPIRATION RATE: 18 BRPM | TEMPERATURE: 98.3 F

## 2023-08-21 DIAGNOSIS — I65.23 BILATERAL CAROTID ARTERY STENOSIS: ICD-10-CM

## 2023-08-21 DIAGNOSIS — I10 ESSENTIAL HYPERTENSION: ICD-10-CM

## 2023-08-21 DIAGNOSIS — M79.7 FIBROMYALGIA: ICD-10-CM

## 2023-08-21 DIAGNOSIS — E03.4 HYPOTHYROIDISM DUE TO ACQUIRED ATROPHY OF THYROID: ICD-10-CM

## 2023-08-21 DIAGNOSIS — E78.00 HYPERCHOLESTEROLEMIA: ICD-10-CM

## 2023-08-21 DIAGNOSIS — D86.9 SARCOIDOSIS: ICD-10-CM

## 2023-08-21 DIAGNOSIS — R41.3 DISTURBANCE OF MEMORY: ICD-10-CM

## 2023-08-21 DIAGNOSIS — F51.04 CHRONIC INSOMNIA: ICD-10-CM

## 2023-08-21 DIAGNOSIS — Z79.899 CONTROLLED SUBSTANCE AGREEMENT SIGNED: Primary | ICD-10-CM

## 2023-08-21 DIAGNOSIS — F17.200 TOBACCO DEPENDENCE: ICD-10-CM

## 2023-08-21 DIAGNOSIS — G45.9 TIA (TRANSIENT ISCHEMIC ATTACK): ICD-10-CM

## 2023-08-21 DIAGNOSIS — G45.1 TRANSIENT ISCHEMIC ATTACK INVOLVING RIGHT INTERNAL CAROTID ARTERY: ICD-10-CM

## 2023-08-21 DIAGNOSIS — K21.9 GASTROESOPHAGEAL REFLUX DISEASE WITHOUT ESOPHAGITIS: ICD-10-CM

## 2023-08-21 DIAGNOSIS — K50.00 CROHN'S DISEASE OF SMALL INTESTINE WITHOUT COMPLICATION (HCC): ICD-10-CM

## 2023-08-21 DIAGNOSIS — Z02.83 ENCOUNTER FOR DRUG SCREENING: ICD-10-CM

## 2023-08-21 DIAGNOSIS — R73.03 PREDIABETES: ICD-10-CM

## 2023-08-21 LAB
ALBUMIN SERPL-MCNC: 3.8 G/DL (ref 3.5–5)
ALBUMIN/GLOB SERPL: 1.4 (ref 1.1–2.2)
ALP SERPL-CCNC: 95 U/L (ref 45–117)
ALT SERPL-CCNC: 25 U/L (ref 12–78)
ANION GAP SERPL CALC-SCNC: 6 MMOL/L (ref 5–15)
APPEARANCE UR: CLEAR
AST SERPL-CCNC: 16 U/L (ref 15–37)
BACTERIA URNS QL MICRO: NEGATIVE /HPF
BASOPHILS # BLD: 0.1 K/UL (ref 0–0.1)
BASOPHILS NFR BLD: 1 % (ref 0–1)
BILIRUB SERPL-MCNC: 0.4 MG/DL (ref 0.2–1)
BILIRUB UR QL: NEGATIVE
BUN SERPL-MCNC: 17 MG/DL (ref 6–20)
BUN/CREAT SERPL: 20 (ref 12–20)
CALCIUM SERPL-MCNC: 9.1 MG/DL (ref 8.5–10.1)
CHLORIDE SERPL-SCNC: 109 MMOL/L (ref 97–108)
CHOLEST SERPL-MCNC: 120 MG/DL
CO2 SERPL-SCNC: 27 MMOL/L (ref 21–32)
COLOR UR: NORMAL
CREAT SERPL-MCNC: 0.83 MG/DL (ref 0.7–1.3)
DIFFERENTIAL METHOD BLD: NORMAL
EOSINOPHIL # BLD: 0.3 K/UL (ref 0–0.4)
EOSINOPHIL NFR BLD: 4 % (ref 0–7)
EPITH CASTS URNS QL MICRO: NORMAL /LPF
ERYTHROCYTE [DISTWIDTH] IN BLOOD BY AUTOMATED COUNT: 12.8 % (ref 11.5–14.5)
GLOBULIN SER CALC-MCNC: 2.8 G/DL (ref 2–4)
GLUCOSE SERPL-MCNC: 94 MG/DL (ref 65–100)
GLUCOSE UR STRIP.AUTO-MCNC: NEGATIVE MG/DL
HCT VFR BLD AUTO: 49.4 % (ref 36.6–50.3)
HDLC SERPL-MCNC: 39 MG/DL
HDLC SERPL: 3.1 (ref 0–5)
HGB BLD-MCNC: 16.2 G/DL (ref 12.1–17)
HGB UR QL STRIP: NEGATIVE
HYALINE CASTS URNS QL MICRO: NORMAL /LPF (ref 0–5)
IMM GRANULOCYTES # BLD AUTO: 0 K/UL (ref 0–0.04)
IMM GRANULOCYTES NFR BLD AUTO: 0 % (ref 0–0.5)
KETONES UR QL STRIP.AUTO: NEGATIVE MG/DL
LDLC SERPL CALC-MCNC: 68.8 MG/DL (ref 0–100)
LEUKOCYTE ESTERASE UR QL STRIP.AUTO: NEGATIVE
LYMPHOCYTES # BLD: 1.6 K/UL (ref 0.8–3.5)
LYMPHOCYTES NFR BLD: 24 % (ref 12–49)
MCH RBC QN AUTO: 30.2 PG (ref 26–34)
MCHC RBC AUTO-ENTMCNC: 32.8 G/DL (ref 30–36.5)
MCV RBC AUTO: 92 FL (ref 80–99)
MONOCYTES # BLD: 0.6 K/UL (ref 0–1)
MONOCYTES NFR BLD: 9 % (ref 5–13)
NEUTS SEG # BLD: 4.1 K/UL (ref 1.8–8)
NEUTS SEG NFR BLD: 62 % (ref 32–75)
NITRITE UR QL STRIP.AUTO: NEGATIVE
NRBC # BLD: 0 K/UL (ref 0–0.01)
NRBC BLD-RTO: 0 PER 100 WBC
PH UR STRIP: 5.5 (ref 5–8)
PLATELET # BLD AUTO: 296 K/UL (ref 150–400)
PMV BLD AUTO: 11.1 FL (ref 8.9–12.9)
POTASSIUM SERPL-SCNC: 4.4 MMOL/L (ref 3.5–5.1)
PROT SERPL-MCNC: 6.6 G/DL (ref 6.4–8.2)
PROT UR STRIP-MCNC: NEGATIVE MG/DL
RBC # BLD AUTO: 5.37 M/UL (ref 4.1–5.7)
RBC #/AREA URNS HPF: NORMAL /HPF (ref 0–5)
SODIUM SERPL-SCNC: 142 MMOL/L (ref 136–145)
SP GR UR REFRACTOMETRY: 1.02 (ref 1–1.03)
TRIGL SERPL-MCNC: 61 MG/DL
URINE CULTURE IF INDICATED: NORMAL
UROBILINOGEN UR QL STRIP.AUTO: 0.2 EU/DL (ref 0.2–1)
VLDLC SERPL CALC-MCNC: 12.2 MG/DL
WBC # BLD AUTO: 6.6 K/UL (ref 4.1–11.1)
WBC URNS QL MICRO: NORMAL /HPF (ref 0–4)

## 2023-08-21 PROCEDURE — 3079F DIAST BP 80-89 MM HG: CPT | Performed by: NURSE PRACTITIONER

## 2023-08-21 PROCEDURE — 99214 OFFICE O/P EST MOD 30 MIN: CPT | Performed by: NURSE PRACTITIONER

## 2023-08-21 PROCEDURE — 3074F SYST BP LT 130 MM HG: CPT | Performed by: NURSE PRACTITIONER

## 2023-08-21 RX ORDER — DEXLANSOPRAZOLE 60 MG/1
60 CAPSULE, DELAYED RELEASE ORAL DAILY
Qty: 90 CAPSULE | Refills: 0 | Status: SHIPPED | OUTPATIENT
Start: 2023-08-21

## 2023-08-21 ASSESSMENT — ENCOUNTER SYMPTOMS
EYE ITCHING: 0
SORE THROAT: 0
COLOR CHANGE: 0
RECTAL PAIN: 0
EYE DISCHARGE: 0
VOMITING: 0
DIARRHEA: 0
NAUSEA: 0
CONSTIPATION: 0
WHEEZING: 0
APNEA: 0
CHOKING: 0
COUGH: 0
ANAL BLEEDING: 0
PHOTOPHOBIA: 0
STRIDOR: 0
EYE PAIN: 0
SINUS PAIN: 0
TROUBLE SWALLOWING: 0
BLOOD IN STOOL: 0
BACK PAIN: 0
FACIAL SWELLING: 0
ABDOMINAL PAIN: 0
SINUS PRESSURE: 0

## 2023-08-21 NOTE — PROGRESS NOTES
Chief Complaint   Patient presents with    New Patient     New to provider establish care, 4 month f/up     1. Have you been to the ER, urgent care clinic since your last visit? Hospitalized since your last visit? No    2. Have you seen or consulted any other health care providers outside of the [address] [place] [address] Avenue since your last visit? Include any pap smears or colon screening.  No

## 2023-08-21 NOTE — PROGRESS NOTES
Mirian Hernandez. (: 1960) is a 61 y.o. male here for evaluation of the following chief complaint(s):  New Patient (New to provider establish care, 4 month f/up)           SUBJECTIVE/OBJECTIVE:  HPI-     Mr. Kalyn Eckert, 62 yo male, here today to establish with new provider. Past medical history includes hypertension, bilateral carotid artery stenosis, TIA, GERD, hypothyroidism, Crohn's disease, fibromyalgia, sarcoidosis of the lung, hyper lipidemia, chronic insomnia. He denies chest pain, heart palpitations, lower extremity edema dyspnea. Today he has concerns of chronic left ear wax. Gastroenterology:  Dr. Ambika Shearer   Allergen Reactions    Infliximab Other (See Comments)     Unable to walk       Current Outpatient Medications   Medication Sig Dispense Refill    dexlansoprazole (DEXILANT) 60 MG CPDR delayed release capsule Take 1 capsule by mouth daily 90 capsule 0    DULoxetine (CYMBALTA) 60 MG extended release capsule Take 1 capsule by mouth daily 90 capsule 0    pravastatin (PRAVACHOL) 40 MG tablet Take 1 tablet by mouth daily 90 tablet 0    ipratropium (ATROVENT) 0.06 % nasal spray USE 2 SPRAYS BY BOTH NOSTRILS ROUTE FOUR (4) TIMES DAILY FOR 3 DAYS. INDICATIONS: RUNNY NOSE 15 mL 0    zolpidem (AMBIEN CR) 12.5 MG extended release tablet TAKE 1 TABLET BY MOUTH NIGHTLY 90 tablet 0    fluticasone (FLONASE) 50 MCG/ACT nasal spray USE 2 SPRAYS BY BOTH NOSTRILS ROUTE DAILY. INDICATIONS: CHRONIC STUFFY AND RUNNY NOSE NOT CAUSED BY ALLERGIES 1 each 1    aspirin 81 MG chewable tablet Take 1 tablet by mouth daily      lisinopril (PRINIVIL;ZESTRIL) 20 MG tablet TAKE 1 TABLET BY MOUTH IN THE MORNING FOR 90 DAYS. traZODone (DESYREL) 50 MG tablet Take 1 tablet by mouth       No current facility-administered medications for this visit.         Past Medical History:   Diagnosis Date    At risk for sleep apnea 2022    stop bang 4 - faxed to PCP office    Bilateral carotid artery stenosis 2021

## 2023-08-22 LAB
EST. AVERAGE GLUCOSE BLD GHB EST-MCNC: 120 MG/DL
HBA1C MFR BLD: 5.8 % (ref 4–5.6)

## 2023-08-24 LAB — DRUGS UR: NORMAL

## 2023-09-11 DIAGNOSIS — F51.04 CHRONIC INSOMNIA: Primary | ICD-10-CM

## 2023-09-11 RX ORDER — ALBUTEROL SULFATE 90 UG/1
AEROSOL, METERED RESPIRATORY (INHALATION)
COMMUNITY
Start: 2023-06-09 | End: 2023-09-11 | Stop reason: SDUPTHER

## 2023-09-11 RX ORDER — ALBUTEROL SULFATE 90 UG/1
AEROSOL, METERED RESPIRATORY (INHALATION)
Qty: 8.5 G | Refills: 1 | Status: SHIPPED | OUTPATIENT
Start: 2023-09-11

## 2023-09-11 RX ORDER — ZOLPIDEM TARTRATE 12.5 MG/1
12.5 TABLET, FILM COATED, EXTENDED RELEASE ORAL NIGHTLY PRN
Qty: 90 TABLET | Refills: 0 | Status: SHIPPED | OUTPATIENT
Start: 2023-09-11 | End: 2023-12-10

## 2023-09-11 NOTE — TELEPHONE ENCOUNTER
Requested Prescriptions     Pending Prescriptions Disp Refills    zolpidem (AMBIEN CR) 12.5 MG extended release tablet 90 tablet 0     Sig: Take 1 tablet by mouth nightly as needed for Sleep for up to 90 days. Max Daily Amount: 12.5 mg    albuterol sulfate HFA (PROVENTIL;VENTOLIN;PROAIR) 108 (90 Base) MCG/ACT inhaler 8.5 g 1     Sig: PLEASE SEE ATTACHED FOR DETAILED DIRECTIONS       RX refill request from the patient/pharmacy. Patient last seen 8/21/23 with labs, and next appt. scheduled for 10/26/23.

## 2023-10-16 RX ORDER — DULOXETIN HYDROCHLORIDE 60 MG/1
CAPSULE, DELAYED RELEASE ORAL DAILY
Qty: 90 CAPSULE | Refills: 0 | Status: SHIPPED | OUTPATIENT
Start: 2023-10-16

## 2023-10-16 RX ORDER — PRAVASTATIN SODIUM 40 MG
40 TABLET ORAL DAILY
Qty: 90 TABLET | Refills: 0 | Status: SHIPPED | OUTPATIENT
Start: 2023-10-16

## 2023-10-16 NOTE — TELEPHONE ENCOUNTER
Last Refill: 7-18-23  Last Visit:8-21-23  Next Visit: 10/26/2023     Requested Prescriptions     Pending Prescriptions Disp Refills    DULoxetine (CYMBALTA) 60 MG extended release capsule [Pharmacy Med Name: DULOXETINE HCL DR 60 MG CAP] 90 capsule 0     Sig: TAKE 1 CAPSULE BY MOUTH EVERY DAY    pravastatin (PRAVACHOL) 40 MG tablet [Pharmacy Med Name: PRAVASTATIN SODIUM 40 MG TAB] 90 tablet 0     Sig: TAKE 1 TABLET BY MOUTH EVERY DAY

## 2023-12-10 DIAGNOSIS — F51.04 CHRONIC INSOMNIA: ICD-10-CM

## 2023-12-10 DIAGNOSIS — K21.9 GASTROESOPHAGEAL REFLUX DISEASE WITHOUT ESOPHAGITIS: ICD-10-CM

## 2023-12-11 RX ORDER — ZOLPIDEM TARTRATE 12.5 MG/1
TABLET, FILM COATED, EXTENDED RELEASE ORAL
Qty: 90 TABLET | Refills: 0 | Status: SHIPPED | OUTPATIENT
Start: 2023-12-11 | End: 2024-03-10

## 2023-12-11 RX ORDER — DEXLANSOPRAZOLE 60 MG/1
CAPSULE, DELAYED RELEASE ORAL DAILY
Qty: 90 CAPSULE | Refills: 0 | Status: SHIPPED | OUTPATIENT
Start: 2023-12-11 | End: 2024-02-06

## 2023-12-11 NOTE — TELEPHONE ENCOUNTER
PCP: Victor Hugo Irwin, APRN - NP    Last appt: 8/21/2023  No future appointments.    Requested Prescriptions     Pending Prescriptions Disp Refills    dexlansoprazole (DEXILANT) 60 MG CPDR delayed release capsule [Pharmacy Med Name: DEXLANSOPRAZOLE DR 60 MG CAP] 90 capsule 0     Sig: TAKE 1 CAPSULE BY MOUTH EVERY DAY    zolpidem (AMBIEN CR) 12.5 MG extended release tablet [Pharmacy Med Name: ZOLPIDEM TART ER 12.5 MG TAB] 90 tablet 0     Sig: TAKE 1 TABLET BY MOUTH NIGHTLY AS NEEDED FOR SLEEP FOR UP TO 90 DAYS. MAX DAILY: 12.5 MG       Prior labs and Blood pressures:  BP Readings from Last 3 Encounters:   08/21/23 120/80   04/14/23 116/78   02/24/23 128/76     Lab Results   Component Value Date/Time     08/21/2023 10:33 AM    K 4.4 08/21/2023 10:33 AM     08/21/2023 10:33 AM    CO2 27 08/21/2023 10:33 AM    BUN 17 08/21/2023 10:33 AM    GFRAA >60 09/08/2021 10:20 AM     No results found for: \"HBA1C\", \"[id number]\"  Lab Results   Component Value Date/Time    CHOL 120 08/21/2023 10:33 AM    HDL 39 08/21/2023 10:33 AM    VLDL 19 08/27/2020 10:20 AM     No results found for: \"VITD3\", \"VD3RIA\"        Lab Results   Component Value Date/Time    TSH 0.82 04/14/2023 01:02 PM

## 2024-02-03 DIAGNOSIS — K21.9 GASTROESOPHAGEAL REFLUX DISEASE WITHOUT ESOPHAGITIS: ICD-10-CM

## 2024-02-05 NOTE — TELEPHONE ENCOUNTER
PCP: Victor Hugo Irwin, APRN - NP    Last appt: 8/21/2023  No future appointments.    Requested Prescriptions     Pending Prescriptions Disp Refills    dexlansoprazole (DEXILANT) 60 MG CPDR delayed release capsule [Pharmacy Med Name: DEXLANSOPRAZOLE DR 60 MG CAP] 90 capsule 0     Sig: TAKE 1 CAPSULE BY MOUTH EVERY DAY       Prior labs and Blood pressures:  BP Readings from Last 3 Encounters:   08/21/23 120/80   04/14/23 116/78   02/24/23 128/76     Lab Results   Component Value Date/Time     08/21/2023 10:33 AM    K 4.4 08/21/2023 10:33 AM     08/21/2023 10:33 AM    CO2 27 08/21/2023 10:33 AM    BUN 17 08/21/2023 10:33 AM    GFRAA >60 09/08/2021 10:20 AM     No results found for: \"HBA1C\", \"XLC9VOHP\"  Lab Results   Component Value Date/Time    CHOL 120 08/21/2023 10:33 AM    HDL 39 08/21/2023 10:33 AM    VLDL 19 08/27/2020 10:20 AM     No results found for: \"VITD3\", \"VD3RIA\"        Lab Results   Component Value Date/Time    TSH 0.82 04/14/2023 01:02 PM

## 2024-02-06 RX ORDER — DEXLANSOPRAZOLE 60 MG/1
CAPSULE, DELAYED RELEASE ORAL DAILY
Qty: 90 CAPSULE | Refills: 0 | Status: SHIPPED | OUTPATIENT
Start: 2024-02-06

## 2024-03-08 DIAGNOSIS — F51.04 CHRONIC INSOMNIA: ICD-10-CM

## 2024-03-08 RX ORDER — LOSARTAN POTASSIUM 50 MG/1
50 TABLET ORAL DAILY
Qty: 30 TABLET | Refills: 0 | OUTPATIENT
Start: 2024-03-08

## 2024-03-08 RX ORDER — LOSARTAN POTASSIUM 50 MG/1
50 TABLET ORAL DAILY
COMMUNITY

## 2024-03-08 RX ORDER — ZOLPIDEM TARTRATE 12.5 MG/1
TABLET, FILM COATED, EXTENDED RELEASE ORAL
Qty: 30 TABLET | Refills: 0 | OUTPATIENT
Start: 2024-03-08 | End: 2024-06-06

## 2024-03-08 RX ORDER — PRAVASTATIN SODIUM 40 MG
40 TABLET ORAL DAILY
Qty: 30 TABLET | Refills: 0 | OUTPATIENT
Start: 2024-03-08

## 2024-03-11 ENCOUNTER — CLINICAL DOCUMENTATION (OUTPATIENT)
Facility: CLINIC | Age: 64
End: 2024-03-11

## 2024-03-17 DIAGNOSIS — F51.04 CHRONIC INSOMNIA: ICD-10-CM

## 2024-03-18 RX ORDER — ZOLPIDEM TARTRATE 12.5 MG/1
TABLET, FILM COATED, EXTENDED RELEASE ORAL
Qty: 30 TABLET | Refills: 0 | Status: SHIPPED | OUTPATIENT
Start: 2024-03-18 | End: 2024-06-16

## 2024-03-18 NOTE — TELEPHONE ENCOUNTER
PCP: No primary care provider on file.    Last appt: 8/21/2023    No future appointments.    Requested Prescriptions     Pending Prescriptions Disp Refills    zolpidem (AMBIEN CR) 12.5 MG extended release tablet [Pharmacy Med Name: ZOLPIDEM TART ER 12.5 MG TAB] 30 tablet 0     Sig: TAKE 1 TABLET BY MOUTH NIGHTLY AS NEEDED FOR SLEEP FOR UP TO 90 DAYS. MAX DAILY: 12.5 MG

## 2024-03-27 RX ORDER — PRAVASTATIN SODIUM 40 MG
40 TABLET ORAL DAILY
Qty: 90 TABLET | Refills: 1 | Status: SHIPPED | OUTPATIENT
Start: 2024-03-27

## 2024-03-27 NOTE — TELEPHONE ENCOUNTER
Last Refill: 10-16-23  Last Visit: 8/21/2023 Victor Hugo  Next Visit: Visit date not found     Requested Prescriptions     Pending Prescriptions Disp Refills    pravastatin (PRAVACHOL) 40 MG tablet [Pharmacy Med Name: PRAVASTATIN SODIUM 40 MG TAB] 90 tablet 0     Sig: TAKE 1 TABLET BY MOUTH EVERY DAY

## 2024-04-12 DIAGNOSIS — F51.04 CHRONIC INSOMNIA: ICD-10-CM

## 2024-04-12 RX ORDER — ZOLPIDEM TARTRATE 12.5 MG/1
TABLET, FILM COATED, EXTENDED RELEASE ORAL
Qty: 30 TABLET | Refills: 0 | Status: SHIPPED | OUTPATIENT
Start: 2024-04-12 | End: 2024-05-12

## 2024-04-12 NOTE — TELEPHONE ENCOUNTER
PCP: No primary care provider on file.    Last appt: Visit date not found  No future appointments.    Requested Prescriptions     Pending Prescriptions Disp Refills    zolpidem (AMBIEN CR) 12.5 MG extended release tablet 30 tablet 0     Sig: TAKE 1 TABLET BY MOUTH NIGHTLY AS NEEDED FOR SLEEP FOR UP TO 90 DAYS. MAX DAILY: 12.5 MG       Prior labs and Blood pressures:  BP Readings from Last 3 Encounters:   08/21/23 120/80   04/14/23 116/78   02/24/23 128/76     Lab Results   Component Value Date/Time     08/21/2023 10:33 AM    K 4.4 08/21/2023 10:33 AM     08/21/2023 10:33 AM    CO2 27 08/21/2023 10:33 AM    BUN 17 08/21/2023 10:33 AM    GFRAA >60 09/08/2021 10:20 AM     No results found for: \"HBA1C\", \"[id number]\"  Lab Results   Component Value Date/Time    CHOL 120 08/21/2023 10:33 AM    HDL 39 08/21/2023 10:33 AM    VLDL 19 08/27/2020 10:20 AM     No results found for: \"VITD3\", \"VD3RIA\"        Lab Results   Component Value Date/Time    TSH 0.82 04/14/2023 01:02 PM

## 2024-04-12 NOTE — TELEPHONE ENCOUNTER
Pharmacy: [facility]  Patient is on the list for Dr. Teran.     zolpidem (AMBIEN CR) 12.5 MG extended release tablet [[id number]]    Order Details  Dose, Route, Frequency: As Directed   Dispense Quantity: 30 tablet Refills: 0    Note to Pharmacy: Not to exceed 4 additional fills before 06/08/2024 DX Code Needed  PT SAYS WE ARE TO  RESEND RX.         Sig: TAKE 1 TABLET BY MOUTH NIGHTLY AS NEEDED FOR SLEEP FOR UP TO 90 DAYS. MAX DAILY: 12.5 MG

## 2024-05-13 DIAGNOSIS — F51.04 CHRONIC INSOMNIA: ICD-10-CM

## 2024-05-13 RX ORDER — ZOLPIDEM TARTRATE 12.5 MG/1
TABLET, FILM COATED, EXTENDED RELEASE ORAL
Qty: 15 TABLET | Refills: 0 | Status: SHIPPED | OUTPATIENT
Start: 2024-05-13 | End: 2024-06-13

## 2024-05-13 NOTE — TELEPHONE ENCOUNTER
RX refill request from the patient/pharmacy. Patient last seen 08- with labs, and does not have a f/up appointment.  Requested Prescriptions     Pending Prescriptions Disp Refills    zolpidem (AMBIEN CR) 12.5 MG extended release tablet [Pharmacy Med Name: ZOLPIDEM TART ER 12.5 MG TAB] 15 tablet 0     Sig: TAKE 1 TABLET BY MOUTH NIGHTLY AS NEEDED FOR SLEEP FOR UP TO 90 DAYS. MAX DAILY: 12.5 MG

## 2024-05-25 DIAGNOSIS — F51.04 CHRONIC INSOMNIA: ICD-10-CM

## 2024-05-28 RX ORDER — ZOLPIDEM TARTRATE 12.5 MG/1
TABLET, FILM COATED, EXTENDED RELEASE ORAL
Qty: 15 TABLET | Refills: 0 | Status: SHIPPED | OUTPATIENT
Start: 2024-05-28 | End: 2024-06-28

## 2024-05-28 RX ORDER — LOSARTAN POTASSIUM 50 MG/1
50 TABLET ORAL DAILY
Qty: 30 TABLET | Refills: 0 | OUTPATIENT
Start: 2024-05-28

## 2024-05-28 NOTE — TELEPHONE ENCOUNTER
PCP: No primary care provider on file.    Last appt: 12/30/2022    No future appointments.    Requested Prescriptions     Pending Prescriptions Disp Refills    losartan (COZAAR) 50 MG tablet 30 tablet 0     Sig: Take 1 tablet by mouth daily

## 2024-05-28 NOTE — TELEPHONE ENCOUNTER
PCP: No primary care provider on file.    Last appt: 12/30/2022    No future appointments.    Requested Prescriptions     Pending Prescriptions Disp Refills    zolpidem (AMBIEN CR) 12.5 MG extended release tablet [Pharmacy Med Name: ZOLPIDEM TART ER 12.5 MG TAB] 15 tablet 0     Sig: TAKE 1 TABLET BY MOUTH NIGHTLY AS NEEDED FOR SLEEP. (OVEREDUE FOR FOLLOW UP)

## 2024-05-30 RX ORDER — LOSARTAN POTASSIUM 50 MG/1
50 TABLET ORAL DAILY
Qty: 30 TABLET | Refills: 0 | Status: SHIPPED | OUTPATIENT
Start: 2024-05-30

## 2024-05-30 NOTE — TELEPHONE ENCOUNTER
Patient's wife called in regarding a medication refill. States that he has been taking losartan and is not aware that this medication was changed to lisinopril.   Pharmacy: [facility]  Patient is on the list for Dr. Teran.     losartan (COZAAR) 50 MG tablet [[id number]]    Order Details  Dose: 50 mg Route: Oral Frequency: DAILY   Dispense Quantity: -- Refills: --          Sig: Take 1 tablet by mouth daily

## 2024-06-03 ENCOUNTER — TELEPHONE (OUTPATIENT)
Facility: CLINIC | Age: 64
End: 2024-06-03

## 2024-06-03 NOTE — TELEPHONE ENCOUNTER
Pharmacy faxed over prescription request, per patient, Oswaldo Hernandez. Patient would like an alternative for Ambien as it is on backorder. Patient requests Lunesta (Eszopiclone). Please advise.

## 2024-06-05 ENCOUNTER — OFFICE VISIT (OUTPATIENT)
Facility: CLINIC | Age: 64
End: 2024-06-05
Payer: COMMERCIAL

## 2024-06-05 VITALS
RESPIRATION RATE: 18 BRPM | HEIGHT: 73 IN | OXYGEN SATURATION: 96 % | BODY MASS INDEX: 26.98 KG/M2 | SYSTOLIC BLOOD PRESSURE: 132 MMHG | DIASTOLIC BLOOD PRESSURE: 70 MMHG | TEMPERATURE: 98.1 F | WEIGHT: 203.6 LBS | HEART RATE: 78 BPM

## 2024-06-05 DIAGNOSIS — K50.00 CROHN'S DISEASE OF SMALL INTESTINE WITHOUT COMPLICATION (HCC): ICD-10-CM

## 2024-06-05 DIAGNOSIS — E55.9 VITAMIN D DEFICIENCY: ICD-10-CM

## 2024-06-05 DIAGNOSIS — H61.22 IMPACTED CERUMEN OF LEFT EAR: ICD-10-CM

## 2024-06-05 DIAGNOSIS — Z86.73 HISTORY OF STROKE: ICD-10-CM

## 2024-06-05 DIAGNOSIS — R73.02 IGT (IMPAIRED GLUCOSE TOLERANCE): ICD-10-CM

## 2024-06-05 DIAGNOSIS — E53.8 B12 DEFICIENCY: ICD-10-CM

## 2024-06-05 DIAGNOSIS — F17.200 TOBACCO DEPENDENCE: ICD-10-CM

## 2024-06-05 DIAGNOSIS — R53.83 FATIGUE, UNSPECIFIED TYPE: ICD-10-CM

## 2024-06-05 DIAGNOSIS — K21.9 GASTROESOPHAGEAL REFLUX DISEASE WITHOUT ESOPHAGITIS: ICD-10-CM

## 2024-06-05 DIAGNOSIS — F33.0 MAJOR DEPRESSIVE DISORDER, RECURRENT, MILD (HCC): ICD-10-CM

## 2024-06-05 DIAGNOSIS — Z00.00 ANNUAL PHYSICAL EXAM: Primary | ICD-10-CM

## 2024-06-05 DIAGNOSIS — F51.01 PRIMARY INSOMNIA: ICD-10-CM

## 2024-06-05 DIAGNOSIS — J43.2 CENTRILOBULAR EMPHYSEMA (HCC): ICD-10-CM

## 2024-06-05 DIAGNOSIS — E78.00 HYPERCHOLESTEROLEMIA: ICD-10-CM

## 2024-06-05 DIAGNOSIS — I10 ESSENTIAL HYPERTENSION: ICD-10-CM

## 2024-06-05 PROBLEM — M02.30 REACTIVE ARTHRITIS (HCC): Status: RESOLVED | Noted: 2018-08-14 | Resolved: 2024-06-05

## 2024-06-05 PROBLEM — E03.4 HYPOTHYROIDISM DUE TO ACQUIRED ATROPHY OF THYROID: Status: RESOLVED | Noted: 2021-02-08 | Resolved: 2024-06-05

## 2024-06-05 LAB
25(OH)D3 SERPL-MCNC: 31.9 NG/ML (ref 30–100)
ALBUMIN SERPL-MCNC: 3.9 G/DL (ref 3.5–5)
ALBUMIN/GLOB SERPL: 1.2 (ref 1.1–2.2)
ALP SERPL-CCNC: 116 U/L (ref 45–117)
ALT SERPL-CCNC: 32 U/L (ref 12–78)
ANION GAP SERPL CALC-SCNC: 5 MMOL/L (ref 5–15)
APPEARANCE UR: ABNORMAL
AST SERPL-CCNC: 29 U/L (ref 15–37)
BACTERIA URNS QL MICRO: NEGATIVE /HPF
BASOPHILS # BLD: 0.1 K/UL (ref 0–0.1)
BASOPHILS NFR BLD: 1 % (ref 0–1)
BILIRUB SERPL-MCNC: 0.7 MG/DL (ref 0.2–1)
BILIRUB UR QL: NEGATIVE
BUN SERPL-MCNC: 19 MG/DL (ref 6–20)
BUN/CREAT SERPL: 19 (ref 12–20)
CALCIUM SERPL-MCNC: 9.2 MG/DL (ref 8.5–10.1)
CHLORIDE SERPL-SCNC: 111 MMOL/L (ref 97–108)
CHOLEST SERPL-MCNC: 126 MG/DL
CK SERPL-CCNC: 345 U/L (ref 39–308)
CO2 SERPL-SCNC: 25 MMOL/L (ref 21–32)
COLOR UR: ABNORMAL
CREAT SERPL-MCNC: 1 MG/DL (ref 0.7–1.3)
DIFFERENTIAL METHOD BLD: NORMAL
EOSINOPHIL # BLD: 0.3 K/UL (ref 0–0.4)
EOSINOPHIL NFR BLD: 4 % (ref 0–7)
EPITH CASTS URNS QL MICRO: ABNORMAL /LPF
ERYTHROCYTE [DISTWIDTH] IN BLOOD BY AUTOMATED COUNT: 13.2 % (ref 11.5–14.5)
EST. AVERAGE GLUCOSE BLD GHB EST-MCNC: 114 MG/DL
GLOBULIN SER CALC-MCNC: 3.2 G/DL (ref 2–4)
GLUCOSE SERPL-MCNC: 103 MG/DL (ref 65–100)
GLUCOSE UR STRIP.AUTO-MCNC: NEGATIVE MG/DL
HBA1C MFR BLD: 5.6 % (ref 4–5.6)
HCT VFR BLD AUTO: 45.7 % (ref 36.6–50.3)
HDLC SERPL-MCNC: 47 MG/DL
HDLC SERPL: 2.7 (ref 0–5)
HGB BLD-MCNC: 15.6 G/DL (ref 12.1–17)
HGB UR QL STRIP: NEGATIVE
HYALINE CASTS URNS QL MICRO: ABNORMAL /LPF (ref 0–5)
IMM GRANULOCYTES # BLD AUTO: 0 K/UL (ref 0–0.04)
IMM GRANULOCYTES NFR BLD AUTO: 0 % (ref 0–0.5)
KETONES UR QL STRIP.AUTO: ABNORMAL MG/DL
LDLC SERPL CALC-MCNC: 69.8 MG/DL (ref 0–100)
LEUKOCYTE ESTERASE UR QL STRIP.AUTO: NEGATIVE
LYMPHOCYTES # BLD: 1.3 K/UL (ref 0.8–3.5)
LYMPHOCYTES NFR BLD: 19 % (ref 12–49)
MCH RBC QN AUTO: 31 PG (ref 26–34)
MCHC RBC AUTO-ENTMCNC: 34.1 G/DL (ref 30–36.5)
MCV RBC AUTO: 90.7 FL (ref 80–99)
MONOCYTES # BLD: 0.6 K/UL (ref 0–1)
MONOCYTES NFR BLD: 9 % (ref 5–13)
NEUTS SEG # BLD: 4.6 K/UL (ref 1.8–8)
NEUTS SEG NFR BLD: 67 % (ref 32–75)
NITRITE UR QL STRIP.AUTO: NEGATIVE
NRBC # BLD: 0 K/UL (ref 0–0.01)
NRBC BLD-RTO: 0 PER 100 WBC
PH UR STRIP: 5.5 (ref 5–8)
PLATELET # BLD AUTO: 277 K/UL (ref 150–400)
PMV BLD AUTO: 11 FL (ref 8.9–12.9)
POTASSIUM SERPL-SCNC: 4.3 MMOL/L (ref 3.5–5.1)
PROT SERPL-MCNC: 7.1 G/DL (ref 6.4–8.2)
PROT UR STRIP-MCNC: NEGATIVE MG/DL
PSA SERPL-MCNC: 1.8 NG/ML (ref 0.01–4)
RBC # BLD AUTO: 5.04 M/UL (ref 4.1–5.7)
RBC #/AREA URNS HPF: ABNORMAL /HPF (ref 0–5)
SODIUM SERPL-SCNC: 141 MMOL/L (ref 136–145)
SP GR UR REFRACTOMETRY: 1.02 (ref 1–1.03)
TRIGL SERPL-MCNC: 46 MG/DL
TSH SERPL DL<=0.05 MIU/L-ACNC: 0.87 UIU/ML (ref 0.36–3.74)
UROBILINOGEN UR QL STRIP.AUTO: 1 EU/DL (ref 0.2–1)
VIT B12 SERPL-MCNC: 286 PG/ML (ref 193–986)
VLDLC SERPL CALC-MCNC: 9.2 MG/DL
WBC # BLD AUTO: 6.9 K/UL (ref 4.1–11.1)
WBC URNS QL MICRO: ABNORMAL /HPF (ref 0–4)

## 2024-06-05 PROCEDURE — 3075F SYST BP GE 130 - 139MM HG: CPT | Performed by: INTERNAL MEDICINE

## 2024-06-05 PROCEDURE — G0296 VISIT TO DETERM LDCT ELIG: HCPCS | Performed by: INTERNAL MEDICINE

## 2024-06-05 PROCEDURE — 99396 PREV VISIT EST AGE 40-64: CPT | Performed by: INTERNAL MEDICINE

## 2024-06-05 PROCEDURE — 3078F DIAST BP <80 MM HG: CPT | Performed by: INTERNAL MEDICINE

## 2024-06-05 RX ORDER — TEMAZEPAM 15 MG/1
15 CAPSULE ORAL NIGHTLY PRN
Qty: 30 CAPSULE | Refills: 2 | Status: SHIPPED | OUTPATIENT
Start: 2024-06-05 | End: 2024-09-03

## 2024-06-05 SDOH — ECONOMIC STABILITY: FOOD INSECURITY: WITHIN THE PAST 12 MONTHS, THE FOOD YOU BOUGHT JUST DIDN'T LAST AND YOU DIDN'T HAVE MONEY TO GET MORE.: NEVER TRUE

## 2024-06-05 SDOH — ECONOMIC STABILITY: FOOD INSECURITY: WITHIN THE PAST 12 MONTHS, YOU WORRIED THAT YOUR FOOD WOULD RUN OUT BEFORE YOU GOT MONEY TO BUY MORE.: NEVER TRUE

## 2024-06-05 SDOH — ECONOMIC STABILITY: HOUSING INSECURITY
IN THE LAST 12 MONTHS, WAS THERE A TIME WHEN YOU DID NOT HAVE A STEADY PLACE TO SLEEP OR SLEPT IN A SHELTER (INCLUDING NOW)?: NO

## 2024-06-05 SDOH — ECONOMIC STABILITY: INCOME INSECURITY: HOW HARD IS IT FOR YOU TO PAY FOR THE VERY BASICS LIKE FOOD, HOUSING, MEDICAL CARE, AND HEATING?: NOT HARD AT ALL

## 2024-06-05 ASSESSMENT — PATIENT HEALTH QUESTIONNAIRE - PHQ9
4. FEELING TIRED OR HAVING LITTLE ENERGY: NOT AT ALL
8. MOVING OR SPEAKING SO SLOWLY THAT OTHER PEOPLE COULD HAVE NOTICED. OR THE OPPOSITE, BEING SO FIGETY OR RESTLESS THAT YOU HAVE BEEN MOVING AROUND A LOT MORE THAN USUAL: NOT AT ALL
SUM OF ALL RESPONSES TO PHQ QUESTIONS 1-9: 0
2. FEELING DOWN, DEPRESSED OR HOPELESS: NOT AT ALL
10. IF YOU CHECKED OFF ANY PROBLEMS, HOW DIFFICULT HAVE THESE PROBLEMS MADE IT FOR YOU TO DO YOUR WORK, TAKE CARE OF THINGS AT HOME, OR GET ALONG WITH OTHER PEOPLE: NOT DIFFICULT AT ALL
SUM OF ALL RESPONSES TO PHQ QUESTIONS 1-9: 0
1. LITTLE INTEREST OR PLEASURE IN DOING THINGS: NOT AT ALL
SUM OF ALL RESPONSES TO PHQ QUESTIONS 1-9: 0
9. THOUGHTS THAT YOU WOULD BE BETTER OFF DEAD, OR OF HURTING YOURSELF: NOT AT ALL
6. FEELING BAD ABOUT YOURSELF - OR THAT YOU ARE A FAILURE OR HAVE LET YOURSELF OR YOUR FAMILY DOWN: NOT AT ALL
7. TROUBLE CONCENTRATING ON THINGS, SUCH AS READING THE NEWSPAPER OR WATCHING TELEVISION: NOT AT ALL
SUM OF ALL RESPONSES TO PHQ9 QUESTIONS 1 & 2: 0
SUM OF ALL RESPONSES TO PHQ QUESTIONS 1-9: 0
3. TROUBLE FALLING OR STAYING ASLEEP: NOT AT ALL
5. POOR APPETITE OR OVEREATING: NOT AT ALL

## 2024-06-05 NOTE — PROGRESS NOTES
Oswaldo Hernandez  is a 63 y.o. male     Chief Complaint   Patient presents with    Annual Exam    Fatigue     For the last couple months       /70 (Site: Left Upper Arm, Position: Sitting, Cuff Size: Medium Adult)   Pulse 78   Temp 98.1 °F (36.7 °C) (Oral)   Resp 18   Ht 1.854 m (6' 1\")   Wt 92.4 kg (203 lb 9.6 oz)   SpO2 96%   BMI 26.86 kg/m²     Health Maintenance Due   Topic Date Due    COVID-19 Vaccine (1) Never done    Pneumococcal 0-64 years Vaccine (1 of 2 - PCV) Never done    HIV screen  Never done    Shingles vaccine (1 of 2) Never done    Respiratory Syncytial Virus (RSV) Pregnant or age 60 yrs+ (1 - 1-dose 60+ series) Never done    Low dose CT lung screening &/or counseling  11/01/2023    Depression Monitoring  02/24/2024         \"Have you been to the ER, urgent care clinic since your last visit?  Hospitalized since your last visit?\"    NO    “Have you seen or consulted any other health care providers outside of [facility] since your last visit?”    YES - When: approximately 1 months ago.  Where and Why:  for colonoscopy.                     
given multiple risk factors.  Follow-up with ENT regarding decreased hearing acuity and cerumen.  He was instructed to use Flonase on a regular basis to see if this will help as well.  Trial of temazepam for insomnia and lieu of zolpidem.    Patient Instructions        Learning About Lung Cancer Screening  What is screening for lung cancer?     Lung cancer screening is a way to find some lung cancers early, before a person has any symptoms of the cancer.  Lung cancer screening may help those who have the highest risk for lung cancer--people age 50 and older who are or were heavy smokers. For most people, who aren't at increased risk, screening for lung cancer probably isn't helpful.  Screening won't prevent cancer. And it may not find all lung cancers. Lung cancer screening may lower the risk of dying from lung cancer in a small number of people.  How is it done?  Lung cancer screening is done with a low-dose CT (computed tomography) scan. A CT scan uses X-rays, or radiation, to make detailed pictures of your body. Experts recommend that screening be done in medical centers that focus on finding and treating lung cancer.  Who is screening recommended for?  Lung cancer screening is recommended for people age 50 and older who are or were heavy smokers. That means people with a smoking history of at least 20 pack years. A pack year is a way to measure how heavy a smoker you are or were.  To figure out your pack years, multiply how many packs a day on average (assuming 20 cigarettes per pack) you have smoked by how many years you have smoked. For example:  If you smoked 1 pack a day for 20 years, that's 1 times 20. So you have a smoking history of 20 pack years.  If you smoked 2 packs a day for 10 years, that's 2 times 10. So you have a smoking history of 20 pack years.  Experts agree that screening is for people who have a high risk of lung cancer. But experts don't agree on what high risk means. Some say people age 50

## 2024-06-21 ENCOUNTER — HOSPITAL ENCOUNTER (OUTPATIENT)
Facility: HOSPITAL | Age: 64
Discharge: HOME | End: 2024-06-21
Attending: INTERNAL MEDICINE
Payer: COMMERCIAL

## 2024-06-21 DIAGNOSIS — F17.200 TOBACCO DEPENDENCE: ICD-10-CM

## 2024-06-21 DIAGNOSIS — Z00.00 ANNUAL PHYSICAL EXAM: ICD-10-CM

## 2024-06-21 PROCEDURE — 71271 CT THORAX LUNG CANCER SCR C-: CPT

## 2024-06-24 RX ORDER — LOSARTAN POTASSIUM 50 MG/1
50 TABLET ORAL DAILY
Qty: 90 TABLET | Refills: 1 | Status: SHIPPED | OUTPATIENT
Start: 2024-06-24

## 2024-06-24 RX ORDER — DULOXETIN HYDROCHLORIDE 60 MG/1
60 CAPSULE, DELAYED RELEASE ORAL DAILY
Qty: 90 CAPSULE | Refills: 3 | Status: SHIPPED | OUTPATIENT
Start: 2024-06-24

## 2024-06-24 NOTE — TELEPHONE ENCOUNTER
PCP: FLO Rich MD    Last appt: Visit date not found  Future Appointments   Date Time Provider Department Center   12/12/2024 10:15 AM FLO Rich MD [facility]       Requested Prescriptions     Pending Prescriptions Disp Refills    losartan (COZAAR) 50 MG tablet [Pharmacy Med Name: LOSARTAN POTASSIUM 50 MG TAB] 90 tablet 1     Sig: TAKE 1 TABLET BY MOUTH EVERY DAY       Prior labs and Blood pressures:  BP Readings from Last 3 Encounters:   06/05/24 132/70   08/21/23 120/80   04/14/23 116/78     Lab Results   Component Value Date/Time     06/05/2024 10:40 AM    K 4.3 06/05/2024 10:40 AM     06/05/2024 10:40 AM    CO2 25 06/05/2024 10:40 AM    BUN 19 06/05/2024 10:40 AM    GFRAA >60 09/08/2021 10:20 AM     No results found for: \"HBA1C\", \"[id number]\"  Lab Results   Component Value Date/Time    CHOL 126 06/05/2024 10:40 AM    HDL 47 06/05/2024 10:40 AM    LDL 69.8 06/05/2024 10:40 AM    LDL 68.8 08/21/2023 10:33 AM    VLDL 9.2 06/05/2024 10:40 AM    VLDL 19 08/27/2020 10:20 AM     No results found for: \"VITD3\"        Lab Results   Component Value Date/Time    TSH 0.82 04/14/2023 01:02 PM

## 2024-06-24 NOTE — TELEPHONE ENCOUNTER
PCP: FOL Rich MD    Last appt: 6/5/2024  Future Appointments   Date Time Provider Department Center   12/12/2024 10:15 AM FLO Rich MD [facility]       Requested Prescriptions     Pending Prescriptions Disp Refills    DULoxetine (CYMBALTA) 60 MG extended release capsule 90 capsule 0     Sig: Take 1 capsule by mouth daily       Prior labs and Blood pressures:  BP Readings from Last 3 Encounters:   06/05/24 132/70   08/21/23 120/80   04/14/23 116/78     Lab Results   Component Value Date/Time     06/05/2024 10:40 AM    K 4.3 06/05/2024 10:40 AM     06/05/2024 10:40 AM    CO2 25 06/05/2024 10:40 AM    BUN 19 06/05/2024 10:40 AM    GFRAA >60 09/08/2021 10:20 AM     No results found for: \"HBA1C\", \"[id number]\"  Lab Results   Component Value Date/Time    CHOL 126 06/05/2024 10:40 AM    HDL 47 06/05/2024 10:40 AM    LDL 69.8 06/05/2024 10:40 AM    LDL 68.8 08/21/2023 10:33 AM    VLDL 9.2 06/05/2024 10:40 AM    VLDL 19 08/27/2020 10:20 AM     No results found for: \"VITD3\"        Lab Results   Component Value Date/Time    TSH 0.82 04/14/2023 01:02 PM

## 2024-06-24 NOTE — TELEPHONE ENCOUNTER
Pharmacy: [facility]      DULoxetine (CYMBALTA) 60 MG extended release capsule [[id number]]    Order Details  Dose: -- Route: Oral Frequency: DAILY   Dispense Quantity: 90 capsule Refills: 0          Sig: TAKE 1 CAPSULE BY MOUTH EVERY DAY

## 2024-07-10 DIAGNOSIS — K21.9 GASTROESOPHAGEAL REFLUX DISEASE WITHOUT ESOPHAGITIS: ICD-10-CM

## 2024-07-10 RX ORDER — DEXLANSOPRAZOLE 60 MG/1
60 CAPSULE, DELAYED RELEASE ORAL DAILY
Qty: 90 CAPSULE | Refills: 1 | Status: SHIPPED | OUTPATIENT
Start: 2024-07-10

## 2024-07-10 NOTE — TELEPHONE ENCOUNTER
Anticoagulation Progress Note    Spoke to patient on the phone, notified them that their INR was therapeutic and to continue the regimen they are currently on. Verified that patient took 10 mg last night. Verified medication changes with patient. Scheduled an appointment for 6/22/2023.    Kandis Beard, ClaytonD   dexlansoprazole (DEXILANT) 60 MG CPDR delayed release capsule 90 capsule 0 2/6/2024 --    Sig - Route: TAKE 1 CAPSULE BY MOUTH EVERY DAY - Oral      Last visit 6/5/24  Future appt 12/12/24    Pharmacy [facility]

## 2024-07-10 NOTE — TELEPHONE ENCOUNTER
PCP: FLO Rich MD    Last appt: 6/5/2024    Future Appointments   Date Time Provider Department Center   12/12/2024 10:15 AM FLO Rich MD [facility]       Requested Prescriptions     Pending Prescriptions Disp Refills    dexlansoprazole (DEXILANT) 60 MG CPDR delayed release capsule 90 capsule 0     Sig: Take 1 capsule by mouth daily

## 2024-07-19 ENCOUNTER — TELEPHONE (OUTPATIENT)
Facility: CLINIC | Age: 64
End: 2024-07-19

## 2024-07-19 NOTE — TELEPHONE ENCOUNTER
Pharmacy: [facility]  Patient's wife states that they now have this medication back in stock and would like this to be refilled for a 90 day supply so that he may discontinue the temazepam.    zolpidem (AMBIEN CR) 12.5 MG extended release tablet [[id number]]      Order Details  Dose, Route, Frequency: As Directed   Dispense Quantity:  Refills: 0    Note to Pharmacy:         Sig: TAKE 1 TABLET BY MOUTH NIGHTLY AS NEEDED FOR SLEEP

## 2024-07-25 DIAGNOSIS — F51.01 PRIMARY INSOMNIA: Primary | ICD-10-CM

## 2024-07-25 RX ORDER — ZOLPIDEM TARTRATE 12.5 MG/1
12.5 TABLET, FILM COATED, EXTENDED RELEASE ORAL NIGHTLY PRN
Qty: 90 TABLET | Refills: 0 | Status: SHIPPED | OUTPATIENT
Start: 2024-07-25 | End: 2024-10-23

## 2024-07-25 NOTE — TELEPHONE ENCOUNTER
FLO Rich MD Proctor-Leonard, Olivia, LPN  Caller: Unspecified (6 days ago, 11:15 AM)  Medication prescription sent to pharmacy per request.  Discontinue temazepam.      Patient notified

## 2024-10-14 RX ORDER — TRAZODONE HYDROCHLORIDE 50 MG/1
50 TABLET, FILM COATED ORAL NIGHTLY
Qty: 90 TABLET | Refills: 1 | OUTPATIENT
Start: 2024-10-14

## 2024-10-14 NOTE — TELEPHONE ENCOUNTER
PCP: FLO Rich MD    Last appt: 6/5/2024  Future Appointments   Date Time Provider Department Center   12/12/2024 10:15 AM FLO Rich MD [facility]       Requested Prescriptions     Pending Prescriptions Disp Refills    traZODone (DESYREL) 50 MG tablet [Pharmacy Med Name: TRAZODONE 50 MG TABLET] 90 tablet 1     Sig: TAKE 1 TABLET BY MOUTH AT BEDTIME       Prior labs and Blood pressures:  BP Readings from Last 3 Encounters:   06/05/24 132/70   08/21/23 120/80   04/14/23 116/78     Lab Results   Component Value Date/Time     06/05/2024 10:40 AM    K 4.3 06/05/2024 10:40 AM     06/05/2024 10:40 AM    CO2 25 06/05/2024 10:40 AM    BUN 19 06/05/2024 10:40 AM    GFRAA >60 09/08/2021 10:20 AM     No results found for: \"HBA1C\", \"[id number]\"  Lab Results   Component Value Date/Time    CHOL 126 06/05/2024 10:40 AM    HDL 47 06/05/2024 10:40 AM    LDL 69.8 06/05/2024 10:40 AM    LDL 68.8 08/21/2023 10:33 AM    VLDL 9.2 06/05/2024 10:40 AM    VLDL 19 08/27/2020 10:20 AM     No results found for: \"VITD3\"        Lab Results   Component Value Date/Time    TSH 0.87 06/05/2024 10:40 AM

## 2024-10-20 DIAGNOSIS — F51.01 PRIMARY INSOMNIA: ICD-10-CM

## 2024-10-21 NOTE — TELEPHONE ENCOUNTER
PCP: FLO Rich MD    Last appt: 6/5/2024    Future Appointments   Date Time Provider Department Center   12/12/2024 10:15 AM FLO Rich MD [facility]       Requested Prescriptions     Pending Prescriptions Disp Refills    zolpidem (AMBIEN CR) 12.5 MG extended release tablet [Pharmacy Med Name: ZOLPIDEM TART ER 12.5 MG TAB] 90 tablet 0     Sig: Take 1 tablet by mouth nightly as needed for Sleep for up to 90 days. Max Daily Amount: 12.5 mg

## 2024-10-22 RX ORDER — PRAVASTATIN SODIUM 40 MG
40 TABLET ORAL DAILY
Qty: 90 TABLET | Refills: 1 | Status: SHIPPED | OUTPATIENT
Start: 2024-10-22

## 2024-10-22 RX ORDER — ZOLPIDEM TARTRATE 12.5 MG/1
12.5 TABLET, FILM COATED, EXTENDED RELEASE ORAL NIGHTLY PRN
Qty: 90 TABLET | Refills: 0 | Status: SHIPPED | OUTPATIENT
Start: 2024-10-22 | End: 2025-01-20

## 2024-10-22 NOTE — TELEPHONE ENCOUNTER
PCP: FLO Rich MD    Last appt: 6/5/2024    Future Appointments   Date Time Provider Department Center   12/12/2024 10:15 AM FLO Rich MD [facility]       Requested Prescriptions     Pending Prescriptions Disp Refills    pravastatin (PRAVACHOL) 40 MG tablet [Pharmacy Med Name: PRAVASTATIN SODIUM 40 MG TAB] 90 tablet 1     Sig: TAKE 1 TABLET BY MOUTH EVERY DAY

## 2024-11-13 DIAGNOSIS — K21.9 GASTROESOPHAGEAL REFLUX DISEASE WITHOUT ESOPHAGITIS: ICD-10-CM

## 2024-11-13 RX ORDER — DEXLANSOPRAZOLE 60 MG/1
60 CAPSULE, DELAYED RELEASE ORAL DAILY
Qty: 90 CAPSULE | Refills: 1 | Status: SHIPPED | OUTPATIENT
Start: 2024-11-13 | End: 2024-11-13

## 2024-11-13 RX ORDER — OMEPRAZOLE 40 MG/1
40 CAPSULE, DELAYED RELEASE ORAL
Qty: 90 CAPSULE | Refills: 1 | Status: SHIPPED | OUTPATIENT
Start: 2024-11-13

## 2024-11-13 NOTE — TELEPHONE ENCOUNTER
PCP: FLO Rcih MD    Last appt: 6/5/2024    Future Appointments   Date Time Provider Department Center   12/12/2024 10:15 AM FLO Rich MD [facility]       Requested Prescriptions     Pending Prescriptions Disp Refills    omeprazole (PRILOSEC) 20 MG delayed release capsule [Pharmacy Med Name: OMEPRAZOLE DR 20 MG CAPSULE]  0     Pharmacy comment: Alternative Requested:THE PRESCRIBED MEDICATION IS NOT COVERED BY INSURANCE. PLEASE CONSIDER CHANGING TO ONE OF THE SUGGESTED COVERED ALTERNATIVES.

## 2024-12-12 ENCOUNTER — OFFICE VISIT (OUTPATIENT)
Facility: CLINIC | Age: 64
End: 2024-12-12
Payer: COMMERCIAL

## 2024-12-12 VITALS
HEIGHT: 73 IN | SYSTOLIC BLOOD PRESSURE: 128 MMHG | OXYGEN SATURATION: 97 % | RESPIRATION RATE: 16 BRPM | DIASTOLIC BLOOD PRESSURE: 82 MMHG | BODY MASS INDEX: 27.86 KG/M2 | WEIGHT: 210.2 LBS | HEART RATE: 85 BPM | TEMPERATURE: 97.6 F

## 2024-12-12 DIAGNOSIS — E78.00 HYPERCHOLESTEROLEMIA: ICD-10-CM

## 2024-12-12 DIAGNOSIS — I10 ESSENTIAL HYPERTENSION: Primary | ICD-10-CM

## 2024-12-12 DIAGNOSIS — K21.9 GASTROESOPHAGEAL REFLUX DISEASE WITHOUT ESOPHAGITIS: ICD-10-CM

## 2024-12-12 DIAGNOSIS — R73.02 IGT (IMPAIRED GLUCOSE TOLERANCE): ICD-10-CM

## 2024-12-12 PROCEDURE — 3079F DIAST BP 80-89 MM HG: CPT | Performed by: INTERNAL MEDICINE

## 2024-12-12 PROCEDURE — 99214 OFFICE O/P EST MOD 30 MIN: CPT | Performed by: INTERNAL MEDICINE

## 2024-12-12 PROCEDURE — 3074F SYST BP LT 130 MM HG: CPT | Performed by: INTERNAL MEDICINE

## 2024-12-12 RX ORDER — AZELASTINE HYDROCHLORIDE 137 UG/1
SPRAY, METERED NASAL
COMMUNITY
Start: 2024-09-27

## 2024-12-12 RX ORDER — ALBUTEROL SULFATE 90 UG/1
INHALANT RESPIRATORY (INHALATION)
Qty: 8.5 G | Refills: 1 | Status: SHIPPED | OUTPATIENT
Start: 2024-12-12

## 2024-12-13 LAB
ALBUMIN SERPL-MCNC: 3.9 G/DL (ref 3.5–5)
ALBUMIN/GLOB SERPL: 1.2 (ref 1.1–2.2)
ALP SERPL-CCNC: 116 U/L (ref 45–117)
ALT SERPL-CCNC: 29 U/L (ref 12–78)
ANION GAP SERPL CALC-SCNC: 6 MMOL/L (ref 2–12)
APPEARANCE UR: CLEAR
AST SERPL-CCNC: 19 U/L (ref 15–37)
BILIRUB SERPL-MCNC: 0.3 MG/DL (ref 0.2–1)
BILIRUB UR QL: NEGATIVE
BUN SERPL-MCNC: 19 MG/DL (ref 6–20)
BUN/CREAT SERPL: 19 (ref 12–20)
CALCIUM SERPL-MCNC: 9.3 MG/DL (ref 8.5–10.1)
CHLORIDE SERPL-SCNC: 107 MMOL/L (ref 97–108)
CHOLEST SERPL-MCNC: 158 MG/DL
CK SERPL-CCNC: 209 U/L (ref 39–308)
CO2 SERPL-SCNC: 26 MMOL/L (ref 21–32)
COLOR UR: NORMAL
CREAT SERPL-MCNC: 0.99 MG/DL (ref 0.7–1.3)
EST. AVERAGE GLUCOSE BLD GHB EST-MCNC: 120 MG/DL
GLOBULIN SER CALC-MCNC: 3.2 G/DL (ref 2–4)
GLUCOSE SERPL-MCNC: 94 MG/DL (ref 65–100)
GLUCOSE UR STRIP.AUTO-MCNC: NEGATIVE MG/DL
HBA1C MFR BLD: 5.8 % (ref 4–5.6)
HDLC SERPL-MCNC: 46 MG/DL
HDLC SERPL: 3.4 (ref 0–5)
HGB UR QL STRIP: NEGATIVE
KETONES UR QL STRIP.AUTO: NEGATIVE MG/DL
LDLC SERPL CALC-MCNC: 92 MG/DL (ref 0–100)
LEUKOCYTE ESTERASE UR QL STRIP.AUTO: NEGATIVE
NITRITE UR QL STRIP.AUTO: NEGATIVE
PH UR STRIP: 5.5 (ref 5–8)
POTASSIUM SERPL-SCNC: 4.2 MMOL/L (ref 3.5–5.1)
PROT SERPL-MCNC: 7.1 G/DL (ref 6.4–8.2)
PROT UR STRIP-MCNC: NEGATIVE MG/DL
SODIUM SERPL-SCNC: 139 MMOL/L (ref 136–145)
SP GR UR REFRACTOMETRY: 1.02 (ref 1–1.03)
TRIGL SERPL-MCNC: 100 MG/DL
UROBILINOGEN UR QL STRIP.AUTO: 0.2 EU/DL (ref 0.2–1)
VLDLC SERPL CALC-MCNC: 20 MG/DL

## 2024-12-18 RX ORDER — LOSARTAN POTASSIUM 50 MG/1
50 TABLET ORAL DAILY
Qty: 90 TABLET | Refills: 1 | Status: SHIPPED | OUTPATIENT
Start: 2024-12-18

## 2024-12-18 NOTE — TELEPHONE ENCOUNTER
PCP: FLO Rich MD    Last appt: 12/12/2024  Future Appointments   Date Time Provider Department Center   6/17/2025  9:30 AM FLO Rich MD [facility]       Requested Prescriptions     Pending Prescriptions Disp Refills    losartan (COZAAR) 50 MG tablet [Pharmacy Med Name: LOSARTAN POTASSIUM 50 MG TAB] 90 tablet 1     Sig: TAKE 1 TABLET BY MOUTH EVERY DAY       Prior labs and Blood pressures:  BP Readings from Last 3 Encounters:   12/12/24 128/82   06/05/24 132/70   08/21/23 120/80     Lab Results   Component Value Date/Time     12/12/2024 11:36 AM    K 4.2 12/12/2024 11:36 AM     12/12/2024 11:36 AM    CO2 26 12/12/2024 11:36 AM    BUN 19 12/12/2024 11:36 AM    GFRAA >60 09/08/2021 10:20 AM     No results found for: \"HBA1C\", \"[id number]\"  Lab Results   Component Value Date/Time    CHOL 158 12/12/2024 11:36 AM    HDL 46 12/12/2024 11:36 AM    LDL 92 12/12/2024 11:36 AM    LDL 68.8 08/21/2023 10:33 AM    VLDL 20 12/12/2024 11:36 AM    VLDL 19 08/27/2020 10:20 AM     No results found for: \"VITD3\"        Lab Results   Component Value Date/Time    TSH 0.87 06/05/2024 10:40 AM        Second attempt to reach to reach mother.   Writer left message for mother to call the department back.  Trying to reach you letter sent

## 2025-01-08 NOTE — PROGRESS NOTES
Oswaldo GALINDO David Olivera is a 64 y.o. male     Chief Complaint   Patient presents with    Hypertension     6M       /82 (Site: Left Upper Arm, Position: Sitting, Cuff Size: Medium Adult)   Pulse 85   Temp 97.6 °F (36.4 °C) (Oral)   Resp 16   Ht 1.854 m (6' 1\")   Wt 95.3 kg (210 lb 3.2 oz)   SpO2 97%   BMI 27.73 kg/m²     Health Maintenance Due   Topic Date Due    Pneumococcal 0-64 years Vaccine (1 of 2 - PCV) Never done    HIV screen  Never done    Shingles vaccine (1 of 2) Never done    Respiratory Syncytial Virus (RSV) Pregnant or age 60 yrs+ (1 - Risk 60-74 years 1-dose series) Never done    Flu vaccine (1) 08/01/2024    COVID-19 Vaccine (1 - 2023-24 season) Never done         \"Have you been to the ER, urgent care clinic since your last visit?  Hospitalized since your last visit?\"    NO    “Have you seen or consulted any other health care providers outside of [facility] since your last visit?”    NO                     
negative      Objective:  /82 (Site: Left Upper Arm, Position: Sitting, Cuff Size: Medium Adult)   Pulse 85   Temp 97.6 °F (36.4 °C) (Oral)   Resp 16   Ht 1.854 m (6' 1\")   Wt 95.3 kg (210 lb 3.2 oz)   SpO2 97%   BMI 27.73 kg/m²   Physical Exam:   General appearance - alert, well appearing, and in no distress  Mental status - alert, oriented to person, place, and time  EYE-KEYON, EOMI, fundi normal, corneas normal, no foreign bodies  ENT-ENT exam normal, no neck nodes or sinus tenderness  Nose - normal and patent, no erythema, discharge or polyps  Mouth - mucous membranes moist, pharynx normal without lesions  Neck - supple, no significant adenopathy   Chest - clear to auscultation, no wheezes, rales or rhonchi, symmetric air entry   Heart - normal rate, regular rhythm, normal S1, S2, no murmurs, rubs, clicks or gallops   Abdomen - soft, nontender, nondistended, no masses or organomegaly  Lymph- no adenopathy palpable  Ext-peripheral pulses normal, no pedal edema, no clubbing or cyanosis  Skin-Warm and dry. no hyperpigmentation, vitiligo, or suspicious lesions  Neuro -alert, oriented, normal speech, no focal findings or movement disorder noted      Assessment/Plan:  Oswaldo was seen today for hypertension.    Diagnoses and all orders for this visit:    Essential hypertension  -     Cancel: CK; Future  -     Cancel: Comprehensive Metabolic Panel; Future  -     Cancel: Hemoglobin A1C; Future  -     Cancel: Lipid Panel; Future  -     Cancel: Urinalysis; Future  -     Urinalysis; Future  -     Lipid Panel; Future  -     Hemoglobin A1C; Future  -     Comprehensive Metabolic Panel; Future  -     CK; Future  -     CK  -     Comprehensive Metabolic Panel  -     Hemoglobin A1C  -     Lipid Panel  -     Urinalysis    Gastroesophageal reflux disease without esophagitis    IGT (impaired glucose tolerance)  -     Cancel: CK; Future  -     Cancel: Comprehensive Metabolic Panel; Future  -     Cancel: Hemoglobin A1C;

## 2025-01-15 DIAGNOSIS — F51.01 PRIMARY INSOMNIA: ICD-10-CM

## 2025-01-15 NOTE — TELEPHONE ENCOUNTER
RX refill request from the patient/pharmacy. Patient last seen 12- with labs, and next appt. scheduled for 06-  Requested Prescriptions     Pending Prescriptions Disp Refills    zolpidem (AMBIEN CR) 12.5 MG extended release tablet [Pharmacy Med Name: ZOLPIDEM TART ER 12.5 MG TAB] 90 tablet 0     Sig: Take 1 tablet by mouth nightly as needed for Sleep for up to 90 days. Max Daily Amount: 12.5 mg    .

## 2025-01-16 RX ORDER — ZOLPIDEM TARTRATE 12.5 MG/1
12.5 TABLET, FILM COATED, EXTENDED RELEASE ORAL NIGHTLY PRN
Qty: 90 TABLET | Refills: 0 | Status: SHIPPED | OUTPATIENT
Start: 2025-01-16 | End: 2025-04-16

## 2025-04-21 DIAGNOSIS — F51.01 PRIMARY INSOMNIA: ICD-10-CM

## 2025-04-22 RX ORDER — PRAVASTATIN SODIUM 40 MG
40 TABLET ORAL DAILY
Qty: 90 TABLET | Refills: 1 | Status: SHIPPED | OUTPATIENT
Start: 2025-04-22

## 2025-04-22 RX ORDER — ZOLPIDEM TARTRATE 12.5 MG/1
12.5 TABLET, FILM COATED, EXTENDED RELEASE ORAL NIGHTLY PRN
Qty: 90 TABLET | Refills: 0 | Status: SHIPPED | OUTPATIENT
Start: 2025-04-22 | End: 2025-07-21

## 2025-04-22 NOTE — TELEPHONE ENCOUNTER
PCP: FLO Rich MD    Last appt: 12/12/2024    Future Appointments   Date Time Provider Department Center   6/17/2025  9:30 AM FLO Rich MD [facility]       Requested Prescriptions     Pending Prescriptions Disp Refills    zolpidem (AMBIEN CR) 12.5 MG extended release tablet [Pharmacy Med Name: ZOLPIDEM TART ER 12.5 MG TAB] 90 tablet 0     Sig: Take 1 tablet by mouth nightly as needed for Sleep for up to 90 days. Max Daily Amount: 12.5 mg    pravastatin (PRAVACHOL) 40 MG tablet [Pharmacy Med Name: PRAVASTATIN SODIUM 40 MG TAB] 90 tablet 1     Sig: TAKE 1 TABLET BY MOUTH EVERY DAY

## 2025-05-03 DIAGNOSIS — K21.9 GASTROESOPHAGEAL REFLUX DISEASE WITHOUT ESOPHAGITIS: ICD-10-CM

## 2025-05-05 RX ORDER — DULOXETIN HYDROCHLORIDE 60 MG/1
60 CAPSULE, DELAYED RELEASE ORAL DAILY
Qty: 90 CAPSULE | Refills: 3 | Status: CANCELLED | OUTPATIENT
Start: 2025-05-05

## 2025-05-05 RX ORDER — DEXLANSOPRAZOLE 60 MG/1
CAPSULE, DELAYED RELEASE ORAL DAILY
Qty: 90 CAPSULE | Refills: 1 | Status: SHIPPED | OUTPATIENT
Start: 2025-05-05

## 2025-05-21 ENCOUNTER — APPOINTMENT (OUTPATIENT)
Facility: HOSPITAL | Age: 65
End: 2025-05-21
Payer: COMMERCIAL

## 2025-05-21 ENCOUNTER — HOSPITAL ENCOUNTER (EMERGENCY)
Facility: HOSPITAL | Age: 65
Discharge: HOME | End: 2025-05-22
Attending: EMERGENCY MEDICINE
Payer: COMMERCIAL

## 2025-05-21 DIAGNOSIS — M54.2 CERVICAL PAIN (NECK): ICD-10-CM

## 2025-05-21 DIAGNOSIS — K57.32 DIVERTICULITIS OF COLON: ICD-10-CM

## 2025-05-21 DIAGNOSIS — R10.31 ABDOMINAL PAIN, RIGHT LOWER QUADRANT: Primary | ICD-10-CM

## 2025-05-21 DIAGNOSIS — G44.209 TENSION HEADACHE: ICD-10-CM

## 2025-05-21 LAB
ALBUMIN SERPL-MCNC: 3.5 G/DL (ref 3.5–5)
ALBUMIN/GLOB SERPL: 0.8 (ref 1.1–2.2)
ALP SERPL-CCNC: 113 U/L (ref 45–117)
ALT SERPL-CCNC: 22 U/L (ref 12–78)
ANION GAP SERPL CALC-SCNC: 4 MMOL/L (ref 2–12)
APPEARANCE UR: CLEAR
AST SERPL-CCNC: 19 U/L (ref 15–37)
BACTERIA URNS QL MICRO: NEGATIVE /HPF
BASOPHILS # BLD: 0.04 K/UL (ref 0–0.1)
BASOPHILS NFR BLD: 0.5 % (ref 0–1)
BILIRUB SERPL-MCNC: 0.5 MG/DL (ref 0.2–1)
BILIRUB UR QL: NEGATIVE
BUN SERPL-MCNC: 17 MG/DL (ref 6–20)
BUN/CREAT SERPL: 18 (ref 12–20)
CALCIUM SERPL-MCNC: 8.8 MG/DL (ref 8.5–10.1)
CHLORIDE SERPL-SCNC: 107 MMOL/L (ref 97–108)
CO2 SERPL-SCNC: 27 MMOL/L (ref 21–32)
COLOR UR: ABNORMAL
CREAT SERPL-MCNC: 0.95 MG/DL (ref 0.7–1.3)
DIFFERENTIAL METHOD BLD: ABNORMAL
EKG ATRIAL RATE: 100 BPM
EKG DIAGNOSIS: NORMAL
EKG P AXIS: 74 DEGREES
EKG P-R INTERVAL: 158 MS
EKG Q-T INTERVAL: 380 MS
EKG QRS DURATION: 94 MS
EKG QTC CALCULATION (BAZETT): 490 MS
EKG R AXIS: 36 DEGREES
EKG T AXIS: 73 DEGREES
EKG VENTRICULAR RATE: 100 BPM
EOSINOPHIL # BLD: 0.07 K/UL (ref 0–0.4)
EOSINOPHIL NFR BLD: 0.8 % (ref 0–7)
EPITH CASTS URNS QL MICRO: ABNORMAL /LPF
ERYTHROCYTE [DISTWIDTH] IN BLOOD BY AUTOMATED COUNT: 12.8 % (ref 11.5–14.5)
GLOBULIN SER CALC-MCNC: 4.3 G/DL (ref 2–4)
GLUCOSE SERPL-MCNC: 87 MG/DL (ref 65–100)
GLUCOSE UR STRIP.AUTO-MCNC: NEGATIVE MG/DL
HCT VFR BLD AUTO: 45 % (ref 36.6–50.3)
HGB BLD-MCNC: 14.8 G/DL (ref 12.1–17)
HGB UR QL STRIP: NEGATIVE
HYALINE CASTS URNS QL MICRO: ABNORMAL /LPF (ref 0–2)
IMM GRANULOCYTES # BLD AUTO: 0.03 K/UL (ref 0–0.04)
IMM GRANULOCYTES NFR BLD AUTO: 0.4 % (ref 0–0.5)
KETONES UR QL STRIP.AUTO: ABNORMAL MG/DL
LEUKOCYTE ESTERASE UR QL STRIP.AUTO: NEGATIVE
LIPASE SERPL-CCNC: 16 U/L (ref 13–75)
LYMPHOCYTES # BLD: 1.15 K/UL (ref 0.8–3.5)
LYMPHOCYTES NFR BLD: 13.5 % (ref 12–49)
MCH RBC QN AUTO: 29.6 PG (ref 26–34)
MCHC RBC AUTO-ENTMCNC: 32.9 G/DL (ref 30–36.5)
MCV RBC AUTO: 90 FL (ref 80–99)
MONOCYTES # BLD: 0.77 K/UL (ref 0–1)
MONOCYTES NFR BLD: 9.1 % (ref 5–13)
NEUTS SEG # BLD: 6.43 K/UL (ref 1.8–8)
NEUTS SEG NFR BLD: 75.7 % (ref 32–75)
NITRITE UR QL STRIP.AUTO: NEGATIVE
NRBC # BLD: 0 K/UL (ref 0–0.01)
NRBC BLD-RTO: 0 PER 100 WBC
PH UR STRIP: 5.5 (ref 5–8)
PLATELET # BLD AUTO: 280 K/UL (ref 150–400)
PMV BLD AUTO: 10.1 FL (ref 8.9–12.9)
POTASSIUM SERPL-SCNC: 3.6 MMOL/L (ref 3.5–5.1)
PROT SERPL-MCNC: 7.8 G/DL (ref 6.4–8.2)
PROT UR STRIP-MCNC: NEGATIVE MG/DL
RBC # BLD AUTO: 5 M/UL (ref 4.1–5.7)
RBC #/AREA URNS HPF: ABNORMAL /HPF (ref 0–5)
SODIUM SERPL-SCNC: 138 MMOL/L (ref 136–145)
SP GR UR REFRACTOMETRY: 1.01
URINE CULTURE IF INDICATED: ABNORMAL
UROBILINOGEN UR QL STRIP.AUTO: 1 EU/DL (ref 0.2–1)
WBC # BLD AUTO: 8.5 K/UL (ref 4.1–11.1)
WBC URNS QL MICRO: ABNORMAL /HPF (ref 0–4)

## 2025-05-21 PROCEDURE — 85025 COMPLETE CBC W/AUTO DIFF WBC: CPT

## 2025-05-21 PROCEDURE — 74177 CT ABD & PELVIS W/CONTRAST: CPT

## 2025-05-21 PROCEDURE — 72125 CT NECK SPINE W/O DYE: CPT

## 2025-05-21 PROCEDURE — 6360000004 HC RX CONTRAST MEDICATION: Performed by: EMERGENCY MEDICINE

## 2025-05-21 PROCEDURE — 81001 URINALYSIS AUTO W/SCOPE: CPT

## 2025-05-21 PROCEDURE — 6360000002 HC RX W HCPCS: Performed by: EMERGENCY MEDICINE

## 2025-05-21 PROCEDURE — 96374 THER/PROPH/DIAG INJ IV PUSH: CPT

## 2025-05-21 PROCEDURE — 6360000004 HC RX CONTRAST MEDICATION: Performed by: RADIOLOGY

## 2025-05-21 PROCEDURE — 99285 EMERGENCY DEPT VISIT HI MDM: CPT

## 2025-05-21 PROCEDURE — 36415 COLL VENOUS BLD VENIPUNCTURE: CPT

## 2025-05-21 PROCEDURE — 70450 CT HEAD/BRAIN W/O DYE: CPT

## 2025-05-21 PROCEDURE — 83690 ASSAY OF LIPASE: CPT

## 2025-05-21 PROCEDURE — 93005 ELECTROCARDIOGRAM TRACING: CPT

## 2025-05-21 PROCEDURE — 96375 TX/PRO/DX INJ NEW DRUG ADDON: CPT

## 2025-05-21 PROCEDURE — 80053 COMPREHEN METABOLIC PANEL: CPT

## 2025-05-21 RX ORDER — METHOCARBAMOL 750 MG/1
750 TABLET, FILM COATED ORAL 4 TIMES DAILY PRN
Qty: 20 TABLET | Refills: 0 | Status: SHIPPED | OUTPATIENT
Start: 2025-05-21 | End: 2025-05-31

## 2025-05-21 RX ORDER — BUTALBITAL, ACETAMINOPHEN AND CAFFEINE 50; 325; 40 MG/1; MG/1; MG/1
1 TABLET ORAL EVERY 4 HOURS PRN
Qty: 180 TABLET | Refills: 3 | Status: SHIPPED | OUTPATIENT
Start: 2025-05-21

## 2025-05-21 RX ORDER — MORPHINE SULFATE 4 MG/ML
4 INJECTION, SOLUTION INTRAMUSCULAR; INTRAVENOUS
Status: COMPLETED | OUTPATIENT
Start: 2025-05-21 | End: 2025-05-21

## 2025-05-21 RX ORDER — ONDANSETRON 2 MG/ML
4 INJECTION INTRAMUSCULAR; INTRAVENOUS ONCE
Status: COMPLETED | OUTPATIENT
Start: 2025-05-21 | End: 2025-05-21

## 2025-05-21 RX ORDER — IOPAMIDOL 755 MG/ML
100 INJECTION, SOLUTION INTRAVASCULAR
Status: COMPLETED | OUTPATIENT
Start: 2025-05-21 | End: 2025-05-21

## 2025-05-21 RX ORDER — DIATRIZOATE MEGLUMINE AND DIATRIZOATE SODIUM 660; 100 MG/ML; MG/ML
30 SOLUTION ORAL; RECTAL
Status: COMPLETED | OUTPATIENT
Start: 2025-05-21 | End: 2025-05-21

## 2025-05-21 RX ORDER — DICYCLOMINE HCL 20 MG
20 TABLET ORAL 4 TIMES DAILY PRN
Qty: 20 TABLET | Refills: 0 | Status: SHIPPED | OUTPATIENT
Start: 2025-05-21

## 2025-05-21 RX ADMIN — MORPHINE SULFATE 4 MG: 4 INJECTION, SOLUTION INTRAMUSCULAR; INTRAVENOUS at 20:54

## 2025-05-21 RX ADMIN — ONDANSETRON 4 MG: 2 INJECTION, SOLUTION INTRAMUSCULAR; INTRAVENOUS at 20:52

## 2025-05-21 RX ADMIN — IOPAMIDOL 100 ML: 755 INJECTION, SOLUTION INTRAVENOUS at 22:04

## 2025-05-21 RX ADMIN — DIATRIZOATE MEGLUMINE AND DIATRIZOATE SODIUM 30 ML: 660; 100 LIQUID ORAL; RECTAL at 20:57

## 2025-05-21 ASSESSMENT — PAIN DESCRIPTION - LOCATION
LOCATION: ABDOMEN
LOCATION: ABDOMEN

## 2025-05-21 ASSESSMENT — PAIN SCALES - GENERAL
PAINLEVEL_OUTOF10: 8
PAINLEVEL_OUTOF10: 8

## 2025-05-21 ASSESSMENT — PAIN DESCRIPTION - ORIENTATION
ORIENTATION: RIGHT
ORIENTATION: RIGHT;LOWER

## 2025-05-21 ASSESSMENT — PAIN - FUNCTIONAL ASSESSMENT: PAIN_FUNCTIONAL_ASSESSMENT: 0-10

## 2025-05-22 VITALS
HEIGHT: 73 IN | TEMPERATURE: 97.7 F | WEIGHT: 205.91 LBS | HEART RATE: 97 BPM | BODY MASS INDEX: 27.29 KG/M2 | DIASTOLIC BLOOD PRESSURE: 132 MMHG | SYSTOLIC BLOOD PRESSURE: 166 MMHG | RESPIRATION RATE: 20 BRPM | OXYGEN SATURATION: 93 %

## 2025-05-22 NOTE — ED PROVIDER NOTES
[facility] EMERGENCY DEPARTMENT  EMERGENCY DEPARTMENT ENCOUNTER       Pt Name: Oswaldo Hernandez Jr.  MRN: [id number]  Birthdate 1960  Date of evaluation: 5/21/2025  Provider: Gagan Arango MD   PCP: FLO Rich MD  Note Started: 8:27 PM EDT 5/21/25     CHIEF COMPLAINT       Chief Complaint   Patient presents with    Abdominal Pain     Pt ambulatory through triage with cc of RLQ pain x 1 week. Pt endorses pain radiates to back. Endorses generalized fatigue. Endorses N/V. Hx of Chron's dx         HISTORY OF PRESENT ILLNESS: 1 or more elements      History From: Patient, History limited by: none     Oswaldo Hernandez Jr. is a 64 y.o. male patient with history of Crohn's, fibromyalgia, sarcoidosis, IBS, TIA, COPD, here for abdominal pain, headache and neck pain.  Patient's headaches and neck pain started few days ago.  Denies trauma or fever.  Headache is mild but the neck pain is a 6 out of 10.  He had an episode where he had pain in both arms and felt like he could not move. That occurred 5 days ago, and lasted for about 20 seconds.  Denies chest pain or shortness of breath.  Abdominal pain started a week ago but became constant a few days ago.  Located in the right lower quadrant and right back.  It does not feel like his Crohn's.  He denies any change in bowel habits or dysuria.  He does have urinary frequency.  He has had nausea but no vomiting.  Abdominal pain is an 8 out of 10 in severity currently.       Please See MDM for Additional Details of the HPI/PMH  Nursing Notes were all reviewed and agreed with or any disagreements were addressed in the HPI.     REVIEW OF SYSTEMS        Positives and Pertinent negatives as per HPI.    PAST HISTORY     Past Medical History:  Past Medical History:   Diagnosis Date    At risk for sleep apnea 05/05/2022    stop bang 4 - faxed to PCP office    Bilateral carotid artery stenosis 02/2021    mild, <15%    Chronic insomnia 08/16/2018    Crohn's disease (HCC)

## 2025-05-28 LAB
EKG ATRIAL RATE: 100 BPM
EKG DIAGNOSIS: NORMAL
EKG P AXIS: 74 DEGREES
EKG P-R INTERVAL: 158 MS
EKG Q-T INTERVAL: 380 MS
EKG QRS DURATION: 94 MS
EKG QTC CALCULATION (BAZETT): 490 MS
EKG R AXIS: 36 DEGREES
EKG T AXIS: 73 DEGREES
EKG VENTRICULAR RATE: 100 BPM

## 2025-06-12 RX ORDER — DULOXETIN HYDROCHLORIDE 60 MG/1
CAPSULE, DELAYED RELEASE ORAL DAILY
Qty: 90 CAPSULE | Refills: 3 | Status: SHIPPED | OUTPATIENT
Start: 2025-06-12

## 2025-06-12 RX ORDER — LOSARTAN POTASSIUM 50 MG/1
50 TABLET ORAL DAILY
Qty: 90 TABLET | Refills: 1 | Status: SHIPPED | OUTPATIENT
Start: 2025-06-12

## 2025-06-12 NOTE — TELEPHONE ENCOUNTER
PCP: FLO Rich MD    Last appt: 12/12/2024    Future Appointments   Date Time Provider Department Center   6/17/2025  9:30 AM FLO Rich MD [facility]       Requested Prescriptions     Pending Prescriptions Disp Refills    losartan (COZAAR) 50 MG tablet [Pharmacy Med Name: LOSARTAN POTASSIUM 50 MG TAB] 90 tablet 1     Sig: TAKE 1 TABLET BY MOUTH EVERY DAY    DULoxetine (CYMBALTA) 60 MG extended release capsule [Pharmacy Med Name: DULOXETINE HCL DR 60 MG CAP] 90 capsule 3     Sig: TAKE 1 CAPSULE BY MOUTH EVERY DAY

## 2025-06-17 ENCOUNTER — OFFICE VISIT (OUTPATIENT)
Facility: CLINIC | Age: 65
End: 2025-06-17
Payer: MEDICARE

## 2025-06-17 ENCOUNTER — TELEPHONE (OUTPATIENT)
Facility: CLINIC | Age: 65
End: 2025-06-17

## 2025-06-17 VITALS
HEIGHT: 73 IN | SYSTOLIC BLOOD PRESSURE: 122 MMHG | WEIGHT: 203 LBS | RESPIRATION RATE: 19 BRPM | HEART RATE: 60 BPM | OXYGEN SATURATION: 98 % | DIASTOLIC BLOOD PRESSURE: 90 MMHG | TEMPERATURE: 98.2 F | BODY MASS INDEX: 26.9 KG/M2

## 2025-06-17 DIAGNOSIS — E78.00 HYPERCHOLESTEROLEMIA: ICD-10-CM

## 2025-06-17 DIAGNOSIS — I49.9 IRREGULAR HEART RHYTHM: ICD-10-CM

## 2025-06-17 DIAGNOSIS — E55.9 VITAMIN D DEFICIENCY: ICD-10-CM

## 2025-06-17 DIAGNOSIS — J43.2 CENTRILOBULAR EMPHYSEMA (HCC): ICD-10-CM

## 2025-06-17 DIAGNOSIS — Z87.891 PERSONAL HISTORY OF TOBACCO USE: ICD-10-CM

## 2025-06-17 DIAGNOSIS — K21.9 GASTROESOPHAGEAL REFLUX DISEASE WITHOUT ESOPHAGITIS: ICD-10-CM

## 2025-06-17 DIAGNOSIS — Z00.00 ANNUAL PHYSICAL EXAM: Primary | ICD-10-CM

## 2025-06-17 DIAGNOSIS — F51.04 CHRONIC INSOMNIA: ICD-10-CM

## 2025-06-17 DIAGNOSIS — E53.8 B12 DEFICIENCY: ICD-10-CM

## 2025-06-17 DIAGNOSIS — I10 ESSENTIAL HYPERTENSION: ICD-10-CM

## 2025-06-17 DIAGNOSIS — Z12.5 SPECIAL SCREENING FOR MALIGNANT NEOPLASM OF PROSTATE: ICD-10-CM

## 2025-06-17 DIAGNOSIS — R53.83 FATIGUE, UNSPECIFIED TYPE: ICD-10-CM

## 2025-06-17 DIAGNOSIS — R73.02 IGT (IMPAIRED GLUCOSE TOLERANCE): ICD-10-CM

## 2025-06-17 DIAGNOSIS — Z79.899 LONG-TERM USE OF HIGH-RISK MEDICATION: ICD-10-CM

## 2025-06-17 DIAGNOSIS — K50.00 CROHN'S DISEASE OF SMALL INTESTINE WITHOUT COMPLICATION (HCC): ICD-10-CM

## 2025-06-17 PROCEDURE — 1036F TOBACCO NON-USER: CPT | Performed by: INTERNAL MEDICINE

## 2025-06-17 PROCEDURE — 3017F COLORECTAL CA SCREEN DOC REV: CPT | Performed by: INTERNAL MEDICINE

## 2025-06-17 PROCEDURE — 93000 ELECTROCARDIOGRAM COMPLETE: CPT | Performed by: INTERNAL MEDICINE

## 2025-06-17 PROCEDURE — 3023F SPIROM DOC REV: CPT | Performed by: INTERNAL MEDICINE

## 2025-06-17 PROCEDURE — G8427 DOCREV CUR MEDS BY ELIG CLIN: HCPCS | Performed by: INTERNAL MEDICINE

## 2025-06-17 PROCEDURE — 99214 OFFICE O/P EST MOD 30 MIN: CPT | Performed by: INTERNAL MEDICINE

## 2025-06-17 PROCEDURE — G8419 CALC BMI OUT NRM PARAM NOF/U: HCPCS | Performed by: INTERNAL MEDICINE

## 2025-06-17 PROCEDURE — 3074F SYST BP LT 130 MM HG: CPT | Performed by: INTERNAL MEDICINE

## 2025-06-17 PROCEDURE — 3080F DIAST BP >= 90 MM HG: CPT | Performed by: INTERNAL MEDICINE

## 2025-06-17 SDOH — ECONOMIC STABILITY: FOOD INSECURITY: WITHIN THE PAST 12 MONTHS, THE FOOD YOU BOUGHT JUST DIDN'T LAST AND YOU DIDN'T HAVE MONEY TO GET MORE.: NEVER TRUE

## 2025-06-17 SDOH — ECONOMIC STABILITY: FOOD INSECURITY: WITHIN THE PAST 12 MONTHS, YOU WORRIED THAT YOUR FOOD WOULD RUN OUT BEFORE YOU GOT MONEY TO BUY MORE.: NEVER TRUE

## 2025-06-17 ASSESSMENT — PATIENT HEALTH QUESTIONNAIRE - PHQ9
10. IF YOU CHECKED OFF ANY PROBLEMS, HOW DIFFICULT HAVE THESE PROBLEMS MADE IT FOR YOU TO DO YOUR WORK, TAKE CARE OF THINGS AT HOME, OR GET ALONG WITH OTHER PEOPLE: NOT DIFFICULT AT ALL
1. LITTLE INTEREST OR PLEASURE IN DOING THINGS: NOT AT ALL
SUM OF ALL RESPONSES TO PHQ QUESTIONS 1-9: 0
SUM OF ALL RESPONSES TO PHQ QUESTIONS 1-9: 0
5. POOR APPETITE OR OVEREATING: NOT AT ALL
SUM OF ALL RESPONSES TO PHQ QUESTIONS 1-9: 0
7. TROUBLE CONCENTRATING ON THINGS, SUCH AS READING THE NEWSPAPER OR WATCHING TELEVISION: NOT AT ALL
9. THOUGHTS THAT YOU WOULD BE BETTER OFF DEAD, OR OF HURTING YOURSELF: NOT AT ALL
2. FEELING DOWN, DEPRESSED OR HOPELESS: NOT AT ALL
6. FEELING BAD ABOUT YOURSELF - OR THAT YOU ARE A FAILURE OR HAVE LET YOURSELF OR YOUR FAMILY DOWN: NOT AT ALL
4. FEELING TIRED OR HAVING LITTLE ENERGY: NOT AT ALL
3. TROUBLE FALLING OR STAYING ASLEEP: NOT AT ALL
SUM OF ALL RESPONSES TO PHQ QUESTIONS 1-9: 0
8. MOVING OR SPEAKING SO SLOWLY THAT OTHER PEOPLE COULD HAVE NOTICED. OR THE OPPOSITE, BEING SO FIGETY OR RESTLESS THAT YOU HAVE BEEN MOVING AROUND A LOT MORE THAN USUAL: NOT AT ALL

## 2025-06-18 LAB
25(OH)D3 SERPL-MCNC: 53.4 NG/ML (ref 30–100)
ALBUMIN SERPL-MCNC: 3.7 G/DL (ref 3.5–5)
ALBUMIN/GLOB SERPL: 1.1 (ref 1.1–2.2)
ALP SERPL-CCNC: 105 U/L (ref 45–117)
ALT SERPL-CCNC: 26 U/L (ref 12–78)
ANION GAP SERPL CALC-SCNC: 5 MMOL/L (ref 2–12)
APPEARANCE UR: CLEAR
AST SERPL-CCNC: 20 U/L (ref 15–37)
BASOPHILS # BLD: 0.05 K/UL (ref 0–0.1)
BASOPHILS NFR BLD: 0.8 % (ref 0–1)
BILIRUB SERPL-MCNC: 0.4 MG/DL (ref 0.2–1)
BILIRUB UR QL: NEGATIVE
BUN SERPL-MCNC: 19 MG/DL (ref 6–20)
BUN/CREAT SERPL: 19 (ref 12–20)
CALCIUM SERPL-MCNC: 9.3 MG/DL (ref 8.5–10.1)
CHLORIDE SERPL-SCNC: 109 MMOL/L (ref 97–108)
CHOLEST SERPL-MCNC: 125 MG/DL
CK SERPL-CCNC: 180 U/L (ref 39–308)
CO2 SERPL-SCNC: 26 MMOL/L (ref 21–32)
COLOR UR: NORMAL
CREAT SERPL-MCNC: 1 MG/DL (ref 0.7–1.3)
DIFFERENTIAL METHOD BLD: NORMAL
EOSINOPHIL # BLD: 0.21 K/UL (ref 0–0.4)
EOSINOPHIL NFR BLD: 3.5 % (ref 0–7)
ERYTHROCYTE [DISTWIDTH] IN BLOOD BY AUTOMATED COUNT: 13.4 % (ref 11.5–14.5)
EST. AVERAGE GLUCOSE BLD GHB EST-MCNC: 128 MG/DL
GLOBULIN SER CALC-MCNC: 3.4 G/DL (ref 2–4)
GLUCOSE SERPL-MCNC: 99 MG/DL (ref 65–100)
GLUCOSE UR STRIP.AUTO-MCNC: NEGATIVE MG/DL
HBA1C MFR BLD: 6.1 % (ref 4–5.6)
HCT VFR BLD AUTO: 48.6 % (ref 36.6–50.3)
HDLC SERPL-MCNC: 42 MG/DL
HDLC SERPL: 3 (ref 0–5)
HGB BLD-MCNC: 15.5 G/DL (ref 12.1–17)
HGB UR QL STRIP: NEGATIVE
IMM GRANULOCYTES # BLD AUTO: 0.01 K/UL (ref 0–0.04)
IMM GRANULOCYTES NFR BLD AUTO: 0.2 % (ref 0–0.5)
KETONES UR QL STRIP.AUTO: NEGATIVE MG/DL
LDLC SERPL CALC-MCNC: 66.2 MG/DL (ref 0–100)
LEUKOCYTE ESTERASE UR QL STRIP.AUTO: NEGATIVE
LYMPHOCYTES # BLD: 1.35 K/UL (ref 0.8–3.5)
LYMPHOCYTES NFR BLD: 22.5 % (ref 12–49)
MCH RBC QN AUTO: 29.9 PG (ref 26–34)
MCHC RBC AUTO-ENTMCNC: 31.9 G/DL (ref 30–36.5)
MCV RBC AUTO: 93.8 FL (ref 80–99)
MONOCYTES # BLD: 0.6 K/UL (ref 0–1)
MONOCYTES NFR BLD: 10 % (ref 5–13)
NEUTS SEG # BLD: 3.77 K/UL (ref 1.8–8)
NEUTS SEG NFR BLD: 63 % (ref 32–75)
NITRITE UR QL STRIP.AUTO: NEGATIVE
NRBC # BLD: 0 K/UL (ref 0–0.01)
NRBC BLD-RTO: 0 PER 100 WBC
PH UR STRIP: 5.5 (ref 5–8)
PLATELET # BLD AUTO: 273 K/UL (ref 150–400)
PMV BLD AUTO: 11.7 FL (ref 8.9–12.9)
POTASSIUM SERPL-SCNC: 4.4 MMOL/L (ref 3.5–5.1)
PROT SERPL-MCNC: 7.1 G/DL (ref 6.4–8.2)
PROT UR STRIP-MCNC: NEGATIVE MG/DL
PSA SERPL-MCNC: 1.9 NG/ML (ref 0.01–4)
RBC # BLD AUTO: 5.18 M/UL (ref 4.1–5.7)
SODIUM SERPL-SCNC: 140 MMOL/L (ref 136–145)
SP GR UR REFRACTOMETRY: 1.02 (ref 1–1.03)
TRIGL SERPL-MCNC: 84 MG/DL
TSH SERPL DL<=0.05 MIU/L-ACNC: 1.28 UIU/ML (ref 0.36–3.74)
UROBILINOGEN UR QL STRIP.AUTO: 0.2 EU/DL (ref 0.2–1)
VIT B12 SERPL-MCNC: 334 PG/ML (ref 193–986)
VLDLC SERPL CALC-MCNC: 16.8 MG/DL
WBC # BLD AUTO: 6 K/UL (ref 4.1–11.1)

## 2025-06-23 ENCOUNTER — RESULTS FOLLOW-UP (OUTPATIENT)
Facility: CLINIC | Age: 65
End: 2025-06-23

## 2025-07-19 DIAGNOSIS — F51.01 PRIMARY INSOMNIA: ICD-10-CM

## 2025-07-21 NOTE — TELEPHONE ENCOUNTER
PCP: FLO Rich MD    Last appt: 6/17/2025    Future Appointments   Date Time Provider Department Center   12/17/2025 10:15 AM FLO Rich MD [facility]       Requested Prescriptions     Pending Prescriptions Disp Refills    zolpidem (AMBIEN CR) 12.5 MG extended release tablet [Pharmacy Med Name: ZOLPIDEM TART ER 12.5 MG TAB] 90 tablet 0     Sig: TAKE 1 TABLET BY MOUTH NIGHTLY AS NEEDED FOR SLEEP FOR 90 DAYS. MAX DAILY: 1 TAB

## 2025-07-23 RX ORDER — ZOLPIDEM TARTRATE 12.5 MG/1
TABLET, FILM COATED, EXTENDED RELEASE ORAL
Qty: 90 TABLET | Refills: 0 | Status: SHIPPED | OUTPATIENT
Start: 2025-07-23 | End: 2025-10-21

## (undated) DEVICE — CHEST/BREAST-MRMCASU: Brand: MEDLINE INDUSTRIES, INC.

## (undated) DEVICE — SUT SLK 0 30IN SH BLK --

## (undated) DEVICE — GLOVE ORANGE PI 7 1/2   MSG9075

## (undated) DEVICE — SUTURE VCRL SZ 3-0 L54IN ABSRB VLT LIGAPAK REEL NDL J205G

## (undated) DEVICE — DERMABOND SKIN ADH 0.7ML -- DERMABOND ADVANCED 12/BX

## (undated) DEVICE — GOWN,SIRUS,NONRNF,SETINSLV,2XL,18/CS: Brand: MEDLINE

## (undated) DEVICE — HYPODERMIC SAFETY NEEDLE: Brand: MONOJECT

## (undated) DEVICE — SUTURE VCRL SZ 3-0 L27IN ABSRB UD L26MM SH 1/2 CIR J416H

## (undated) DEVICE — SUTURE VCRL SZ 4-0 L27IN ABSRB UD L19MM PS-2 3/8 CIR PRIM J426H

## (undated) DEVICE — ROCKER SWITCH PENCIL HOLSTER: Brand: VALLEYLAB

## (undated) DEVICE — INTENDED FOR TISSUE SEPARATION, AND OTHER PROCEDURES THAT REQUIRE A SHARP SURGICAL BLADE TO PUNCTURE OR CUT.: Brand: BARD-PARKER ® CARBON RIB-BACK BLADES

## (undated) DEVICE — HEX-LOCKING BLADE ELECTRODE: Brand: EDGE

## (undated) DEVICE — SUTURE PROL SZ 0 L30IN NONABSORBABLE BLU L26MM CT-2 1/2 CIR 8412H

## (undated) DEVICE — SPONGE: SPECIALTY PEANUT XR 100/CS: Brand: MEDICAL ACTION INDUSTRIES

## (undated) DEVICE — BLADE ASSEMB CLP HAIR FINE --